# Patient Record
Sex: FEMALE | Race: WHITE | HISPANIC OR LATINO | Employment: OTHER | URBAN - METROPOLITAN AREA
[De-identification: names, ages, dates, MRNs, and addresses within clinical notes are randomized per-mention and may not be internally consistent; named-entity substitution may affect disease eponyms.]

---

## 2017-08-17 ENCOUNTER — OFFICE VISIT (OUTPATIENT)
Dept: MEDICAL GROUP | Facility: MEDICAL CENTER | Age: 62
End: 2017-08-17
Payer: COMMERCIAL

## 2017-08-17 VITALS
WEIGHT: 144 LBS | DIASTOLIC BLOOD PRESSURE: 74 MMHG | HEART RATE: 68 BPM | OXYGEN SATURATION: 99 % | HEIGHT: 60 IN | BODY MASS INDEX: 28.27 KG/M2 | RESPIRATION RATE: 16 BRPM | TEMPERATURE: 98 F | SYSTOLIC BLOOD PRESSURE: 122 MMHG

## 2017-08-17 DIAGNOSIS — Z87.39 HISTORY OF GOUT: ICD-10-CM

## 2017-08-17 DIAGNOSIS — E11.9 TYPE 2 DIABETES MELLITUS WITHOUT COMPLICATION, WITHOUT LONG-TERM CURRENT USE OF INSULIN (HCC): ICD-10-CM

## 2017-08-17 DIAGNOSIS — Z12.31 ENCOUNTER FOR SCREENING MAMMOGRAM FOR BREAST CANCER: ICD-10-CM

## 2017-08-17 DIAGNOSIS — S39.012D BACK STRAIN, SUBSEQUENT ENCOUNTER: ICD-10-CM

## 2017-08-17 DIAGNOSIS — Z12.11 ENCOUNTER FOR FIT (FECAL IMMUNOCHEMICAL TEST) SCREENING: ICD-10-CM

## 2017-08-17 DIAGNOSIS — Z80.3 FAMILY HISTORY OF BREAST CANCER: ICD-10-CM

## 2017-08-17 DIAGNOSIS — Z98.84 S/P GASTRIC BYPASS: ICD-10-CM

## 2017-08-17 DIAGNOSIS — F51.01 PRIMARY INSOMNIA: ICD-10-CM

## 2017-08-17 DIAGNOSIS — Z98.890: ICD-10-CM

## 2017-08-17 DIAGNOSIS — E03.9 ACQUIRED HYPOTHYROIDISM: ICD-10-CM

## 2017-08-17 DIAGNOSIS — E78.00 PURE HYPERCHOLESTEROLEMIA: ICD-10-CM

## 2017-08-17 PROBLEM — S39.012A BACK STRAIN: Status: ACTIVE | Noted: 2017-08-17

## 2017-08-17 PROCEDURE — 99204 OFFICE O/P NEW MOD 45 MIN: CPT | Performed by: NURSE PRACTITIONER

## 2017-08-17 RX ORDER — CYCLOBENZAPRINE HCL 10 MG
TABLET ORAL
COMMUNITY
Start: 2016-06-27 | End: 2019-07-26

## 2017-08-17 RX ORDER — PRAVASTATIN SODIUM 40 MG
TABLET ORAL
COMMUNITY
Start: 2017-04-17 | End: 2018-03-26 | Stop reason: SDUPTHER

## 2017-08-17 RX ORDER — TRAZODONE HYDROCHLORIDE 50 MG/1
TABLET ORAL
COMMUNITY
Start: 2017-04-17 | End: 2017-08-21 | Stop reason: SDUPTHER

## 2017-08-17 RX ORDER — BLOOD-GLUCOSE METER
KIT MISCELLANEOUS
COMMUNITY
Start: 2016-02-18 | End: 2018-02-17

## 2017-08-17 RX ORDER — COLCHICINE 0.6 MG/1
CAPSULE ORAL
COMMUNITY
Start: 2016-10-18 | End: 2018-10-18

## 2017-08-17 RX ORDER — LEVOTHYROXINE SODIUM 0.07 MG/1
TABLET ORAL
COMMUNITY
Start: 2016-10-11 | End: 2019-06-05

## 2017-08-17 RX ORDER — METFORMIN HYDROCHLORIDE 500 MG/1
500 TABLET, EXTENDED RELEASE ORAL
Qty: 90 TAB | Refills: 5 | Status: SHIPPED | OUTPATIENT
Start: 2017-08-17 | End: 2018-02-20 | Stop reason: SDUPTHER

## 2017-08-17 ASSESSMENT — PATIENT HEALTH QUESTIONNAIRE - PHQ9: CLINICAL INTERPRETATION OF PHQ2 SCORE: 0

## 2017-08-17 NOTE — ASSESSMENT & PLAN NOTE
Last a1c 7.3 4/2017 from care everywhere  On metformin 500 mg TID, having diarrhea daily- at least once  On pravastatin  No daily ASA (gastric bypass)  Losartan, unsure of dose  No CKD, neuropathy, retinopathy  Due for eye exam  Denies polyuria, polydipsia, numbness or tingling in extremities

## 2017-08-17 NOTE — PROGRESS NOTES
Chief Complaint   Patient presents with   • Establish Care     Concetta Tovar is a 62 y.o. female here to [facility], she has recently retired and relocated from [place]. She has 3 grown children, 5 grandchildren. She is single, living alone. She is walking regularly for exercise, generally following a healthy diet. I have reviewed her records from [place] in care everywhere. We discussed:     Family history of breast cancer  4 sisters, some cousins on her father's side  Last mammogram 4/2016 and normal  S/P gastric bypass  About 5 years ago, has lost 65 lbs  On multiple supplements  No h/o anemia  Type 2 diabetes mellitus without complication (CMS-HCC)  Last a1c 7.3 4/2017 from [facility] everywhere  On metformin 500 mg TID, having diarrhea daily- at least once  On pravastatin  No daily ASA (gastric bypass)  Losartan, unsure of dose  No CKD, neuropathy, retinopathy  Due for eye exam  Denies polyuria, polydipsia, numbness or tingling in extremities  Primary insomnia  Well controlled with trazodone  History of gout  Has used colchicine in the past with good results, avoiding dietary triggers  Back strain  Occasional difficulty with low back pain and muscle spasm, using Flexeril on a rare basis  Hypothyroidism  On [id number] µg daily. Stable weight and energy     Current medicines (including changes today)  Current Outpatient Prescriptions   Medication Sig Dispense Refill   • Blood Glucose Monitoring Suppl (ONETOUCH VERIO IQ SYSTEM) w/Device Kit by Does not apply route.     • Colchicine 0.6 MG Cap Take  by mouth.     • cyclobenzaprine (FLEXERIL) 10 MG Tab Take one-half tablet orally as needed for spasms at night     • levothyroxine (SYNTHROID) 75 MCG Tab Take 1 tablet by mouth daily     • pravastatin (PRAVACHOL) 40 MG tablet Take  by mouth.     • trazodone (DESYREL) 50 MG Tab Take one-half to 1 tablet by mouth daily at bedtime     • metformin ER (GLUCOPHAGE XR) 500 MG TABLET SR 24 HR Take 1 Tab by mouth 3  times a day, with meals. 90 Tab 5     No current facility-administered medications for this visit.     She  has a past medical history of Allergy; Anxiety; Diabetes (CMS-HCC); and Thyroid disease.  She  has past surgical history that includes abdominal exploration; eye surgery; hernia repair; and gastric bypass laparoscopic.  Social History   Substance Use Topics   • Smoking status: Former Smoker -- 5 years   • Smokeless tobacco: Never Used      Comment: light smoker, quit 40 years ago   • Alcohol Use: 0.0 oz/week     0 Standard drinks or equivalent per week     Social History     Social History Narrative   • No narrative on file     Family History   Problem Relation Age of Onset   • Arthritis Mother    • Diabetes Mother    • Hypertension Mother    • Hyperlipidemia Mother    • Stroke Mother    • Arthritis Father    • Hyperlipidemia Father    • Hypertension Father    • Diabetes Father    • Arthritis Sister    • Cancer Sister    • Hypertension Sister    • Hyperlipidemia Sister    • Alcohol/Drug Sister    • Hyperlipidemia Brother    • Arthritis Brother    • Hypertension Brother    • Alcohol/Drug Brother    • Arthritis Sister    • Cancer Sister    • Hypertension Sister    • Hyperlipidemia Sister    • Arthritis Sister    • Cancer Sister    • Hyperlipidemia Sister    • Arthritis Sister    • Cancer Sister    • Hyperlipidemia Sister    • Arthritis Sister    • Hyperlipidemia Sister    • Hyperlipidemia Sister    • Hyperlipidemia Sister    • Hyperlipidemia Sister    • Hyperlipidemia Brother    • Arthritis Brother    • Hyperlipidemia Brother      Family Status   Relation Status Death Age   • Mother     • Father     • Sister Alive    • Brother Alive    • Sister Alive    • Sister Alive    • Sister Alive    • Sister Alive    • Sister Alive    • Sister Alive    • Brother Alive    • Brother     • Son Alive    • Son Alive    • Daughter Alive          ROS  Problems listed discussed above, all other systems  reviewed and negative     Objective:     Blood pressure 122/74, pulse 68, temperature 36.7 °C (98 °F), resp. rate 16, height 1.524 m (5'), weight 65.318 kg (144 lb), SpO2 99 %. Body mass index is 28.12 kg/(m^2).  Physical Exam:  General: Alert, oriented in no acute distress.  Eye contact is good, speech is normal, affect calm  HEENT: perrl, Oral mucosa pink moist, no lesions. Nares patent. TMs gray with good landmarks bilaterally. No cervical or supraclavicular lymphadenopathy  Lungs: clear to auscultation bilaterally, good aeration, normal effort. No wheeze/ rhonchi/ rales.  CV: regular rate and rhythm, S1, S2. No murmur, no JVD, no edema. Pedal pulses 2 + bilaterally  Abdomen: soft, nontender, BS x4, no hepatosplenomegaly.  Ext: color normal, vascularity normal, temperature normal. No rash or lesions.  MS: no point tenderness over spine, no obvious deformity. No joint swelling or redness. Strength is 5/5 globally  Neuro: DTR 2+ bilaterally, sensation intact  Assessment and Plan:   The following treatment plan was discussed   1. Family history of breast cancer   4 sisters with breast cancer. She is due for mammogram, order placed.    2. S/P gastric bypass   on multiple supplements, weight stable    3. Pure hypercholesterolemia   well-controlled on pravastatin  COMP METABOLIC PANEL   4. Type 2 diabetes mellitus without complication, without long-term current use of insulin (CMS-[facility])   last A1c slightly above goal at 7.33 she is having significant GI upset with metformin, will change to extended-release preparation. Diabetic diet discussed, encouraged regular exercise. Recheck labs  COMP METABOLIC PANEL    HEMOGLOBIN A1C    MICROALBUMIN CREAT RATIO URINE    LIPID PROFILE    metformin ER (GLUCOPHAGE XR) 500 MG TABLET SR 24 HR    REFERRAL TO OPHTHALMOLOGY   5. Acquired hypothyroidism   clinically euthyroid  TSH+FREE T4   6. Primary insomnia   doing well with trazodone    7. History of gout   no current problems    8.  Back strain, subsequent encounter   occasional muscle strain of the low back, using Flexeril on rare occasions    9. Encounter for screening mammogram for breast cancer  MA-SCREEN MAMMO W/CAD-BILAT   10. Encounter for FIT (fecal immunochemical test) screening  OCCULT BLOOD FECES IMMUNOASSAY       Educated in proper administration of medication(s) ordered today including safety, possible SE, risks, benefits, rationale and alternatives to therapy.         Followup: Pending labs             Please note that this dictation was created using voice recognition software. I have worked with consultants from the vendor as well as technical experts from [employer] [facility] [employer] to optimize the interface. I have made every reasonable attempt to correct obvious errors, but I expect that there are errors of grammar and possibly content that I did not discover before finalizing the note.

## 2017-08-17 NOTE — MR AVS SNAPSHOT
Concetta Tovar   2017 1:20 PM   Office Visit   MRN: [id number]    Department:  [facility]   Dept Phone:  [phone]    Description:  Female : 1955   Provider:  ONELIA Johnson           Reason for Visit     Establish Care           Allergies as of 2017     Allergen Noted Reactions    Nsaids 2017       Other reaction(s): GI Bleeding  Contraindicated after [place] Gastric Bypass or sleeve gastrectomy  due to  risk ulceration in pouch or sleeve.     Hydrocodone-Acetaminophen 2017       HIVES W/ LORTAB ONLY    Lisinopril 2017   Rash    Lovastatin 2017       Other reaction(s): Muscle Ache    Penicillins 2017         You were diagnosed with     Family history of breast cancer   [037574]       S/P gastric bypass   [812383]       Pure hypercholesterolemia   [272.0.ICD-9-CM]       Type 2 diabetes mellitus without complication, without long-term current use of insulin (CMS-[facility])   [2541854]       Acquired hypothyroidism   [1790126]       Primary insomnia   [165019]       History of gout   [893543]       Back strain, subsequent encounter   [627505]       Encounter for screening mammogram for breast cancer   [7119008]       Encounter for FIT (fecal immunochemical test) screening   [[id number]]         Vital Signs     Blood Pressure Pulse Temperature Respirations Height Weight    122/74 mmHg 68 36.7 °C (98 °F) 16 1.524 m (5') 65.318 kg (144 lb)    Body Mass Index Oxygen Saturation Smoking Status             28.12 kg/m2 99% Former Smoker         Basic Information     Date Of Birth Sex Race Ethnicity Preferred Language    1955 Female White Non- English      Problem List              ICD-10-CM Priority Class Noted - Resolved    Family history of breast cancer Z80.3   2017 - Present    S/P gastric bypass Z98.84   2017 - Present    Pure hypercholesterolemia E78.00   2017 - Present    Type 2 diabetes mellitus without complication  (CMS-[facility]) E11.9   8/17/2017 - Present    Acquired hypothyroidism E03.9   8/17/2017 - Present    Primary insomnia F51.01   8/17/2017 - Present    History of gout Z87.39   8/17/2017 - Present    Back strain S39.012A   8/17/2017 - Present      Health Maintenance        Date Due Completion Dates    IMM DTaP/Tdap/Td Vaccine (1 - Tdap) 4/1/1973 ---    PAP SMEAR 4/1/1975 ---    MAMMOGRAM 4/1/1994 ---    COLONOSCOPY 4/1/2004 ---    IMM ZOSTER VACCINE 4/1/2014 ---    IMM INFLUENZA (1) 9/1/2017 ---            Current Immunizations     No immunizations on file.      Below and/or attached are the medications your provider expects you to take. Review all of your home medications and newly ordered medications with your provider and/or pharmacist. Follow medication instructions as directed by your provider and/or pharmacist. Please keep your medication list with you and share with your provider. Update the information when medications are discontinued, doses are changed, or new medications (including over-the-counter products) are added; and carry medication information at all times in the event of emergency situations     Allergies:  NSAIDS - (reactions not documented)     HYDROCODONE-ACETAMINOPHEN - (reactions not documented)     LISINOPRIL - Rash     LOVASTATIN - (reactions not documented)     PENICILLINS - (reactions not documented)               Medications  Valid as of: August 17, 2017 -  2:30 PM    Generic Name Brand Name Tablet Size Instructions for use    Blood Glucose Monitoring Suppl (Kit) ONETOUCH VERIO IQ SYSTEM w/Device by Does not apply route.        Colchicine (Cap) Colchicine 0.6 MG Take  by mouth.        Cyclobenzaprine HCl (Tab) FLEXERIL 10 MG Take one-half tablet orally as needed for spasms at night        Levothyroxine Sodium (Tab) SYNTHROID 75 MCG Take 1 tablet by mouth daily        MetFORMIN HCl (TABLET SR 24 HR) GLUCOPHAGE  MG Take 1 Tab by mouth 3 times a day, with meals.        Pravastatin Sodium  (Tab) PRAVACHOL 40 MG Take  by mouth.        TraZODone HCl (Tab) DESYREL 50 MG Take one-half to 1 tablet by mouth daily at bedtime        .                 Medicines prescribed today were sent to:     [id number] - [place], [place] - [address]    [address] [place] [address]    Phone: [phone] Fax: [phone]    Open 24 Hours?: No      Medication refill instructions:       If your prescription bottle indicates you have medication refills left, it is not necessary to call your provider’s office. Please contact your pharmacy and they will refill your medication.    If your prescription bottle indicates you do not have any refills left, you may request refills at any time through one of the following ways: The online hetras system (except Urgent Care), by calling your provider’s office, or by asking your pharmacy to contact your provider’s office with a refill request. Medication refills are processed only during regular business hours and may not be available until the next business day. Your provider may request additional information or to have a follow-up visit with you prior to refilling your medication.   *Please Note: Medication refills are assigned a new Rx number when refilled electronically. Your pharmacy may indicate that no refills were authorized even though a new prescription for the same medication is available at the pharmacy. Please request the medicine by name with the pharmacy before contacting your provider for a refill.        Your To Do List     Future Labs/Procedures Complete By Expires    COMP METABOLIC PANEL  As directed 8/18/2018    HEMOGLOBIN A1C  As directed 8/18/2018    LIPID PROFILE  As directed 8/18/2018    MA-SCREEN MAMMO W/CAD-BILAT  As directed 9/18/2018    MICROALBUMIN CREAT RATIO URINE  As directed 8/18/2018    OCCULT BLOOD FECES IMMUNOASSAY  As directed 8/18/2018      Referral     A referral request has been sent to our patient care coordination  department. Please allow 3-5 business days for us to process this request and contact you either by phone or mail. If you do not hear from us by the 5th business day, please call us at [phone].           Mac[employer] Access Code: Activation code not generated  Current Mac[employer] Status: Active

## 2017-08-18 ENCOUNTER — TELEPHONE (OUTPATIENT)
Dept: MEDICAL GROUP | Facility: MEDICAL CENTER | Age: 62
End: 2017-08-18

## 2017-08-18 RX ORDER — LOSARTAN POTASSIUM 25 MG/1
25 TABLET ORAL DAILY
Qty: 30 TAB | Refills: 0 | Status: SHIPPED | DISCHARGE
Start: 2017-08-18 | End: 2018-02-18 | Stop reason: SDUPTHER

## 2017-08-18 NOTE — TELEPHONE ENCOUNTER
----- Message from Your Healthcare Team sent at 8/17/2017  5:48 PM PDT -----  Regarding: Prescription Question  Contact: [phone]  losartan  25 mg    1/2 tablet daily

## 2017-08-21 ENCOUNTER — PATIENT MESSAGE (OUTPATIENT)
Dept: MEDICAL GROUP | Facility: MEDICAL CENTER | Age: 62
End: 2017-08-21

## 2017-08-21 RX ORDER — TRAZODONE HYDROCHLORIDE 50 MG/1
TABLET ORAL
Qty: 30 TAB | Refills: 4 | Status: SHIPPED | OUTPATIENT
Start: 2017-08-21 | End: 2018-01-29 | Stop reason: SDUPTHER

## 2017-08-23 ENCOUNTER — HOSPITAL ENCOUNTER (OUTPATIENT)
Dept: LAB | Facility: MEDICAL CENTER | Age: 62
End: 2017-08-23
Attending: NURSE PRACTITIONER
Payer: COMMERCIAL

## 2017-08-23 DIAGNOSIS — E78.00 PURE HYPERCHOLESTEROLEMIA: ICD-10-CM

## 2017-08-23 DIAGNOSIS — E11.9 TYPE 2 DIABETES MELLITUS WITHOUT COMPLICATION, WITHOUT LONG-TERM CURRENT USE OF INSULIN (HCC): ICD-10-CM

## 2017-08-23 LAB
ALBUMIN SERPL BCP-MCNC: 3.6 G/DL (ref 3.2–4.9)
ALBUMIN/GLOB SERPL: 0.9 G/DL
ALP SERPL-CCNC: 57 U/L (ref 30–99)
ALT SERPL-CCNC: 20 U/L (ref 2–50)
ANION GAP SERPL CALC-SCNC: 7 MMOL/L (ref 0–11.9)
AST SERPL-CCNC: 20 U/L (ref 12–45)
BILIRUB SERPL-MCNC: 0.8 MG/DL (ref 0.1–1.5)
BUN SERPL-MCNC: 9 MG/DL (ref 8–22)
CALCIUM SERPL-MCNC: 9.7 MG/DL (ref 8.5–10.5)
CHLORIDE SERPL-SCNC: 104 MMOL/L (ref 96–112)
CHOLEST SERPL-MCNC: 156 MG/DL (ref 100–199)
CO2 SERPL-SCNC: 25 MMOL/L (ref 20–33)
CREAT SERPL-MCNC: 0.67 MG/DL (ref 0.5–1.4)
CREAT UR-MCNC: 80.3 MG/DL
EST. AVERAGE GLUCOSE BLD GHB EST-MCNC: 143 MG/DL
FASTING STATUS PATIENT QL REPORTED: NORMAL
GFR SERPL CREATININE-BSD FRML MDRD: >60 ML/MIN/1.73 M 2
GLOBULIN SER CALC-MCNC: 3.8 G/DL (ref 1.9–3.5)
GLUCOSE SERPL-MCNC: 136 MG/DL (ref 65–99)
HBA1C MFR BLD: 6.6 % (ref 0–5.6)
HDLC SERPL-MCNC: 58 MG/DL
LDLC SERPL CALC-MCNC: 79 MG/DL
MICROALBUMIN UR-MCNC: <0.7 MG/DL
MICROALBUMIN/CREAT UR: NORMAL MG/G (ref 0–30)
POTASSIUM SERPL-SCNC: 4.4 MMOL/L (ref 3.6–5.5)
PROT SERPL-MCNC: 7.4 G/DL (ref 6–8.2)
SODIUM SERPL-SCNC: 136 MMOL/L (ref 135–145)
T4 FREE SERPL-MCNC: 0.97 NG/DL (ref 0.53–1.43)
TRIGL SERPL-MCNC: 97 MG/DL (ref 0–149)
TSH SERPL DL<=0.005 MIU/L-ACNC: 3.23 UIU/ML (ref 0.3–3.7)

## 2017-08-23 PROCEDURE — 80053 COMPREHEN METABOLIC PANEL: CPT

## 2017-08-23 PROCEDURE — 80061 LIPID PANEL: CPT

## 2017-08-23 PROCEDURE — 84439 ASSAY OF FREE THYROXINE: CPT

## 2017-08-23 PROCEDURE — 82570 ASSAY OF URINE CREATININE: CPT

## 2017-08-23 PROCEDURE — 82043 UR ALBUMIN QUANTITATIVE: CPT

## 2017-08-23 PROCEDURE — 36415 COLL VENOUS BLD VENIPUNCTURE: CPT

## 2017-08-23 PROCEDURE — 84443 ASSAY THYROID STIM HORMONE: CPT

## 2017-08-23 PROCEDURE — 83036 HEMOGLOBIN GLYCOSYLATED A1C: CPT

## 2017-08-24 ENCOUNTER — TELEPHONE (OUTPATIENT)
Dept: MEDICAL GROUP | Facility: MEDICAL CENTER | Age: 62
End: 2017-08-24

## 2017-08-24 NOTE — TELEPHONE ENCOUNTER
----- Message from ONELIA Johnson sent at 8/24/2017  8:31 AM PDT -----  Please f/u with patient by phone to ensure mychart message is received    Concetta,  Your diabetes is well controlled with A1c of 6.6. Kidney function, liver function, cholesterol, and thyroid levels look good as well.  Continue with your current medications.  Let me know if you have any questions.  Labs should be rechecked around February/March  Allison MAYA

## 2017-08-26 ENCOUNTER — HOSPITAL ENCOUNTER (OUTPATIENT)
Facility: MEDICAL CENTER | Age: 62
End: 2017-08-26
Attending: NURSE PRACTITIONER
Payer: COMMERCIAL

## 2017-08-26 PROCEDURE — 82274 ASSAY TEST FOR BLOOD FECAL: CPT

## 2017-08-27 ENCOUNTER — PATIENT MESSAGE (OUTPATIENT)
Dept: MEDICAL GROUP | Facility: MEDICAL CENTER | Age: 62
End: 2017-08-27

## 2017-08-29 DIAGNOSIS — Z12.11 ENCOUNTER FOR FIT (FECAL IMMUNOCHEMICAL TEST) SCREENING: ICD-10-CM

## 2017-08-30 LAB — HEMOCCULT STL QL IA: NEGATIVE

## 2017-09-23 ENCOUNTER — HOSPITAL ENCOUNTER (OUTPATIENT)
Dept: RADIOLOGY | Facility: MEDICAL CENTER | Age: 62
End: 2017-09-23
Attending: NURSE PRACTITIONER
Payer: COMMERCIAL

## 2017-09-23 DIAGNOSIS — Z12.31 ENCOUNTER FOR SCREENING MAMMOGRAM FOR BREAST CANCER: ICD-10-CM

## 2017-09-23 PROCEDURE — G0202 SCR MAMMO BI INCL CAD: HCPCS

## 2017-09-25 ENCOUNTER — TELEPHONE (OUTPATIENT)
Dept: MEDICAL GROUP | Facility: MEDICAL CENTER | Age: 62
End: 2017-09-25

## 2017-09-25 DIAGNOSIS — E11.9 TYPE 2 DIABETES MELLITUS WITHOUT COMPLICATION, WITHOUT LONG-TERM CURRENT USE OF INSULIN (HCC): ICD-10-CM

## 2017-09-25 NOTE — TELEPHONE ENCOUNTER
Please find out what glucometer she uses so we can send in the appropriate lancets and test strips.  Abhinav Randall M.D.

## 2017-09-26 RX ORDER — LANCETS 30 GAUGE
EACH MISCELLANEOUS
Qty: 100 EACH | Refills: 3 | Status: SHIPPED | OUTPATIENT
Start: 2017-09-26 | End: 2019-03-27 | Stop reason: SDUPTHER

## 2017-10-11 ENCOUNTER — HOSPITAL ENCOUNTER (OUTPATIENT)
Dept: RADIOLOGY | Facility: MEDICAL CENTER | Age: 62
End: 2017-10-11

## 2018-01-09 ENCOUNTER — OFFICE VISIT (OUTPATIENT)
Dept: MEDICAL GROUP | Facility: MEDICAL CENTER | Age: 63
End: 2018-01-09
Payer: COMMERCIAL

## 2018-01-09 VITALS
HEIGHT: 60 IN | DIASTOLIC BLOOD PRESSURE: 84 MMHG | TEMPERATURE: 98 F | HEART RATE: 82 BPM | WEIGHT: 141.4 LBS | BODY MASS INDEX: 27.76 KG/M2 | OXYGEN SATURATION: 98 % | SYSTOLIC BLOOD PRESSURE: 128 MMHG

## 2018-01-09 DIAGNOSIS — E03.9 ACQUIRED HYPOTHYROIDISM: ICD-10-CM

## 2018-01-09 DIAGNOSIS — E11.9 TYPE 2 DIABETES MELLITUS WITHOUT COMPLICATION, WITHOUT LONG-TERM CURRENT USE OF INSULIN (HCC): ICD-10-CM

## 2018-01-09 DIAGNOSIS — R05.3 CHRONIC COUGH: ICD-10-CM

## 2018-01-09 DIAGNOSIS — J31.0 CHRONIC RHINITIS, UNSPECIFIED TYPE: ICD-10-CM

## 2018-01-09 PROBLEM — J30.0 CHRONIC VASOMOTOR RHINITIS: Status: ACTIVE | Noted: 2018-01-09

## 2018-01-09 PROBLEM — J30.0 CHRONIC VASOMOTOR RHINITIS: Status: RESOLVED | Noted: 2018-01-09 | Resolved: 2018-01-09

## 2018-01-09 PROCEDURE — 99214 OFFICE O/P EST MOD 30 MIN: CPT | Performed by: NURSE PRACTITIONER

## 2018-01-09 RX ORDER — AZELASTINE 1 MG/ML
1 SPRAY, METERED NASAL 2 TIMES DAILY
Qty: 30 ML | Refills: 1 | Status: SHIPPED | OUTPATIENT
Start: 2018-01-09 | End: 2020-06-18 | Stop reason: SDUPTHER

## 2018-01-10 NOTE — ASSESSMENT & PLAN NOTE
"Was having concerns with fatigue, muscle ache, felt that she was \"hitting a wall\" every afternoon around 2 and would need to nap  She felt that this was related to her medication and has discontinued-about 3 weeks ago  She does feel that her energy has actually improved since stopping levothyroxine  Denies constipation, hair loss, heat or cold intolerance  "

## 2018-01-10 NOTE — ASSESSMENT & PLAN NOTE
Daily difficulty with rhinitis and postnasal drainage  She's tried multiple over-the-counter allergy medicines without improvement-currently taking Zyrtec and using Flonase daily  She does have postnasal drainage which may be a contributing factor for her cough  She denies any facial pain, pain in the teeth, headaches. No prior allergy testing

## 2018-01-10 NOTE — ASSESSMENT & PLAN NOTE
We switched to extended release metformin at last visit due to complaints of diarrhea. She took this for about a week and states that her sugar was persistently higher than usual, she has since gone back to immediate release and states that her glucose is coming down  She still having diarrhea about once daily but overall feels that she can deal with this  Last A1c 6.6  No polyuria, polydipsia

## 2018-01-10 NOTE — PROGRESS NOTES
"Subjective:     Chief Complaint   Patient presents with   • Medication Management     Concetta Tovar is a 62 y.o. female here today to follow up on:    Chronic cough  Daily dry unproductive nagging cough for the last last several years, worse at night   Was seen for this by her primary care in [place]  Had pulmonary function testing which showed no evidence of asthma or restrictive lung disease  She continues to cough and finds this quite troublesome. She does report postnasal drainage which may be a factor. Denies any heartburn    Acquired hypothyroidism  Was having concerns with fatigue, muscle ache, felt that she was \"hitting a wall\" every afternoon around 2 and would need to nap  She felt that this was related to her medication and has discontinued-about 3 weeks ago  She does feel that her energy has actually improved since stopping levothyroxine  Denies constipation, hair loss, heat or cold intolerance    Chronic rhinitis  Daily difficulty with rhinitis and postnasal drainage  She's tried multiple over-the-counter allergy medicines without improvement-currently taking Zyrtec and using Flonase daily  She does have postnasal drainage which may be a contributing factor for her cough  She denies any facial pain, pain in the teeth, headaches. No prior allergy testing    Type 2 diabetes mellitus without complication (CMS-[facility])  We switched to extended release metformin at last visit due to complaints of diarrhea. She took this for about a week and states that her sugar was persistently higher than usual, she has since gone back to immediate release and states that her glucose is coming down  She still having diarrhea about once daily but overall feels that she can deal with this  Last A1c 6.6  No polyuria, polydipsia       Current medicines (including changes today)  Current Outpatient Prescriptions   Medication Sig Dispense Refill   • azelastine (ASTELIN) 137 MCG/SPRAY nasal spray [place] 1 Spray in nose 2 times a " day. 30 mL 1   • Blood Glucose Monitoring Suppl Supplies Misc Test strips order: Test strips for One Touch Ultra meter. Sig: use once daily 100 Each 3   • Lancets Misc Lancets order: Lancets for One Touch Ultra meter. Sig: use once daily 100 Each 3   • Blood Glucose Monitoring Suppl Device Meter: Dispense Device of Insurance Preference. Sig. Use as directed for blood sugar monitoring. #1. NR. 1 Device 0   • trazodone (DESYREL) 50 MG Tab Take one-half to 1 tablet by mouth daily at bedtime 30 Tab 4   • losartan (COZAAR) 25 MG Tab Take 1 Tab by mouth every day. 30 Tab 0   • pravastatin (PRAVACHOL) 40 MG tablet Take  by mouth.     • metformin ER (GLUCOPHAGE XR) 500 MG TABLET SR 24 HR Take 1 Tab by mouth 3 times a day, with meals. 90 Tab 5   • Blood Glucose Monitoring Suppl (ONETOUCH VERIO IQ SYSTEM) w/Device Kit by Does not apply route.     • Colchicine 0.6 MG Cap Take  by mouth.     • cyclobenzaprine (FLEXERIL) 10 MG Tab Take one-half tablet orally as needed for spasms at night     • levothyroxine (SYNTHROID) 75 MCG Tab Take 1 tablet by mouth daily       No current facility-administered medications for this visit.      She  has a past medical history of Allergy; Anxiety; Diabetes (CMS-[facility]); and Thyroid disease.    ROS included above     Objective:     Blood pressure 128/84, pulse 82, temperature 36.7 °C (98 °F), height 1.524 m (5'), weight 64.1 kg (141 lb 6.4 oz), SpO2 98 %, not currently breastfeeding. Body mass index is 27.62 kg/m².     Physical Exam:  General: Alert, oriented in no acute distress.  Eye contact is good, speech is normal, affect calm  HEENT: Oral mucosa pink moist, no lesions. Nares with erythema, clear mucus. TMs gray with good landmarks bilaterally. No lymphadenopathy.  Lungs: clear to auscultation bilaterally, normal effort, no wheeze/ rhonchi/ rales.  CV: regular rate and rhythm, S1, S2, no murmur  Abdomen: soft, nontender  Ext: no edema, color normal, vascularity normal, temperature  normal    Assessment and Plan:   The following treatment plan was discussed   1. Chronic cough  Pulmonary function testing in the past without evidence of asthma or restrictive lung disease. She does report persistent postnasal drainage which may be a contributing factor. Will refer for allergy testing  REFERRAL TO ALLERGY   2. Acquired hypothyroidism  Felt that her levothyroxine was making her more fatigued and it discontinued about 3 weeks ago. Evaluate labs in one month  TSH+T3+T4F   3. Chronic rhinitis, unspecified type  Persistent, daily rhinitis. Getting minimal benefit from Zyrtec and Flonase. Will try adding azelastine nasal   Azelastine (ASTELIN) 137 MCG/SPRAY nasal spray   4. Type 2 diabetes mellitus without complication, without long-term current use of insulin (CMS-[facility])  Last A1c controlled at 6.6. She is back on immediately release metformin preparation. Reevaluate labs  COMP METABOLIC PANEL    HEMOGLOBIN A1C       Followup: Labs in February         Please note that this dictation was created using voice recognition software. I have worked with consultants from the vendor as well as technical experts from [facility] [employer] to optimize the interface. I have made every reasonable attempt to correct obvious errors, but I expect that there are errors of grammar and possibly content that I did not discover before finalizing the note.

## 2018-01-10 NOTE — ASSESSMENT & PLAN NOTE
Daily dry unproductive nagging cough for the last last several years, worse at night   Was seen for this by her primary care in [place]  Had pulmonary function testing which showed no evidence of asthma or restrictive lung disease  She continues to cough and finds this quite troublesome. She does report postnasal drainage which may be a factor. Denies any heartburn

## 2018-01-29 RX ORDER — TRAZODONE HYDROCHLORIDE 50 MG/1
TABLET ORAL
Qty: 30 TAB | Refills: 0 | Status: SHIPPED | OUTPATIENT
Start: 2018-01-29 | End: 2018-02-18 | Stop reason: SDUPTHER

## 2018-02-18 ENCOUNTER — PATIENT MESSAGE (OUTPATIENT)
Dept: MEDICAL GROUP | Facility: MEDICAL CENTER | Age: 63
End: 2018-02-18

## 2018-02-19 ENCOUNTER — OFFICE VISIT (OUTPATIENT)
Dept: URGENT CARE | Facility: CLINIC | Age: 63
End: 2018-02-19
Payer: COMMERCIAL

## 2018-02-19 VITALS
HEIGHT: 60 IN | WEIGHT: 141 LBS | SYSTOLIC BLOOD PRESSURE: 124 MMHG | TEMPERATURE: 98.2 F | OXYGEN SATURATION: 97 % | DIASTOLIC BLOOD PRESSURE: 84 MMHG | BODY MASS INDEX: 27.68 KG/M2 | HEART RATE: 85 BPM | RESPIRATION RATE: 14 BRPM

## 2018-02-19 DIAGNOSIS — H10.9 CONJUNCTIVITIS OF BOTH EYES, UNSPECIFIED CONJUNCTIVITIS TYPE: ICD-10-CM

## 2018-02-19 DIAGNOSIS — J01.00 ACUTE MAXILLARY SINUSITIS, RECURRENCE NOT SPECIFIED: ICD-10-CM

## 2018-02-19 PROCEDURE — 99214 OFFICE O/P EST MOD 30 MIN: CPT | Performed by: PHYSICIAN ASSISTANT

## 2018-02-19 RX ORDER — TOBRAMYCIN 3 MG/ML
1 SOLUTION/ DROPS OPHTHALMIC EVERY 4 HOURS
Qty: 1 BOTTLE | Refills: 0 | Status: SHIPPED
Start: 2018-02-19 | End: 2019-12-31

## 2018-02-19 RX ORDER — DOXYCYCLINE HYCLATE 100 MG
100 TABLET ORAL 2 TIMES DAILY
Qty: 14 TAB | Refills: 0 | Status: SHIPPED | OUTPATIENT
Start: 2018-02-19 | End: 2018-02-26

## 2018-02-19 ASSESSMENT — ENCOUNTER SYMPTOMS
NAUSEA: 0
EYE ITCHING: 0
TINGLING: 0
SPUTUM PRODUCTION: 0
MYALGIAS: 0
SINUS PRESSURE: 1
SENSORY CHANGE: 0
ABDOMINAL PAIN: 0
BLURRED VISION: 0
SHORTNESS OF BREATH: 0
FEVER: 0
HEADACHES: 0
EYE DISCHARGE: 1
PALPITATIONS: 0
SORE THROAT: 0
COUGH: 0
CHILLS: 0
FOCAL WEAKNESS: 0
SINUS PAIN: 1
WHEEZING: 0
PHOTOPHOBIA: 0
VOMITING: 0
EYE REDNESS: 1
DOUBLE VISION: 0

## 2018-02-19 NOTE — PROGRESS NOTES
Subjective:      Concetta Tovar is a 62 y.o. female who presents with Eye Problem (possible pink eye)            Eye Problem    Both eyes are affected.This is a new problem. Episode onset: 4-5 days. The problem occurs constantly. The problem has been unchanged. There was no injury mechanism. The patient is experiencing no pain. There is no known exposure to pink eye. She does not wear contacts. Associated symptoms include an eye discharge, eye redness and a recent URI. Pertinent negatives include no blurred vision, double vision, fever, itching, nausea, photophobia, tingling or vomiting. She has tried nothing for the symptoms.   Sinus Problem   This is a new problem. Episode onset: 4-5 days. The problem has been gradually worsening since onset. There has been no fever. Associated symptoms include congestion, ear pain and sinus pressure. Pertinent negatives include no chills, coughing, headaches, shortness of breath, sneezing or sore throat. Treatments tried: Dayquil, Nyquil. The treatment provided mild relief.     Past Medical History:   Diagnosis Date   • Allergy    • Anxiety    • Diabetes (CMS-HCC)    • Thyroid disease        Past Surgical History:   Procedure Laterality Date   • ABDOMINAL EXPLORATION     • EYE SURGERY     • GASTRIC BYPASS LAPAROSCOPIC     • HERNIA REPAIR         Family History   Problem Relation Age of Onset   • Arthritis Mother    • Diabetes Mother    • Hypertension Mother    • Hyperlipidemia Mother    • Stroke Mother    • Arthritis Father    • Hyperlipidemia Father    • Hypertension Father    • Diabetes Father    • Arthritis Sister    • Cancer Sister    • Hypertension Sister    • Hyperlipidemia Sister    • Alcohol/Drug Sister    • Breast Cancer Sister    • Hyperlipidemia Brother    • Arthritis Brother    • Hypertension Brother    • Alcohol/Drug Brother    • Arthritis Sister    • Cancer Sister    • Hypertension Sister    • Hyperlipidemia Sister    • Breast Cancer Sister    • Arthritis Sister     • Cancer Sister    • Hyperlipidemia Sister    • Breast Cancer Sister    • Arthritis Sister    • Cancer Sister    • Hyperlipidemia Sister    • Breast Cancer Sister    • Arthritis Sister    • Hyperlipidemia Sister    • Hyperlipidemia Sister    • Hyperlipidemia Sister    • Hyperlipidemia Brother    • Arthritis Brother    • Hyperlipidemia Brother    • Hyperlipidemia Sister        Allergies   Allergen Reactions   • Nsaids      Other reaction(s): GI Bleeding  Contraindicated after [place] Gastric Bypass or sleeve gastrectomy  due to  risk ulceration in pouch or sleeve.    • Hydrocodone-Acetaminophen      HIVES W/ LORTAB ONLY   • Lisinopril Rash   • Lovastatin      Other reaction(s): Muscle Ache   • Penicillins        Medications, Allergies, and current problem list reviewed today in Epic      Review of Systems   Constitutional: Positive for malaise/fatigue. Negative for chills and fever.   HENT: Positive for congestion, ear pain, sinus pain and sinus pressure. Negative for sneezing and sore throat.    Eyes: Positive for discharge and redness. Negative for blurred vision, double vision, photophobia and itching.   Respiratory: Negative for cough, sputum production, shortness of breath and wheezing.    Cardiovascular: Negative for chest pain, palpitations and leg swelling.   Gastrointestinal: Negative for abdominal pain, nausea and vomiting.   Musculoskeletal: Negative for myalgias.   Neurological: Negative for tingling, sensory change, focal weakness and headaches.     All other systems reviewed and are negative.        Objective:     /84   Pulse 85   Temp 36.8 °C (98.2 °F)   Resp 14   Ht 1.524 m (5')   Wt 64 kg (141 lb)   SpO2 97%   BMI 27.54 kg/m²      Physical Exam   Constitutional: She is oriented to person, place, and time. She appears well-developed and well-nourished. No distress.   HENT:   Head: Normocephalic and atraumatic.   Right Ear: Tympanic membrane, external ear and ear canal normal.   Left  Ear: Tympanic membrane, external ear and ear canal normal.   Nose: Mucosal edema and rhinorrhea present. Right sinus exhibits maxillary sinus tenderness. Left sinus exhibits maxillary sinus tenderness.   Mouth/Throat: Uvula is midline, oropharynx is clear and moist and mucous membranes are normal.   Eyes: EOM and lids are normal. Pupils are equal, round, and reactive to light. Right eye exhibits discharge. Left eye exhibits discharge. Right conjunctiva is injected. Left conjunctiva is injected.   Neck: Neck supple.   Cardiovascular: Normal rate, regular rhythm and normal heart sounds.  Exam reveals no gallop and no friction rub.    No murmur heard.  Pulmonary/Chest: Effort normal and breath sounds normal. No respiratory distress. She has no decreased breath sounds. She has no wheezes. She has no rhonchi. She has no rales.   Lymphadenopathy:     She has no cervical adenopathy.   Neurological: She is alert and oriented to person, place, and time. No cranial nerve deficit.   Skin: Skin is warm and dry. No rash noted.   Psychiatric: She has a normal mood and affect. Her behavior is normal. Judgment and thought content normal.               Assessment/Plan:     1. Acute maxillary sinusitis, recurrence not specified  - doxycycline (VIBRAMYCIN) 100 MG Tab; Take 1 Tab by mouth 2 times a day for 7 days.  Dispense: 14 Tab; Refill: 0    2. Conjunctivitis of both eyes, unspecified conjunctivitis type  - tobramycin (TOBREX) 0.3 % Solution ophthalmic solution; Place 1 Drop in both eyes every 4 hours. While awake for 5-7 days until symptoms.  Dispense: 1 Bottle; Refill: 0      Encouraged Flonase.     Differential diagnoses, Supportive care, and indications for immediate follow-up discussed with patient.   Instructed to return to clinic or nearest emergency department for any change in condition, further concerns, or worsening of symptoms.    The patient demonstrated a good understanding and agreed with the treatment  plan.      Venita Barboza P.A.-C.

## 2018-02-20 DIAGNOSIS — E11.9 TYPE 2 DIABETES MELLITUS WITHOUT COMPLICATION, WITHOUT LONG-TERM CURRENT USE OF INSULIN (HCC): ICD-10-CM

## 2018-02-20 RX ORDER — METFORMIN HYDROCHLORIDE 500 MG/1
500 TABLET, EXTENDED RELEASE ORAL
Qty: 90 TAB | Refills: 5 | Status: SHIPPED | OUTPATIENT
Start: 2018-02-20 | End: 2018-06-04 | Stop reason: SDUPTHER

## 2018-02-20 RX ORDER — POLYMYXIN B SULFATE AND TRIMETHOPRIM 1; 10000 MG/ML; [USP'U]/ML
1 SOLUTION OPHTHALMIC 4 TIMES DAILY
Qty: 10 ML | Refills: 0 | Status: SHIPPED
Start: 2018-02-20 | End: 2019-12-31

## 2018-03-26 ENCOUNTER — PATIENT MESSAGE (OUTPATIENT)
Dept: MEDICAL GROUP | Facility: MEDICAL CENTER | Age: 63
End: 2018-03-26

## 2018-03-26 RX ORDER — PRAVASTATIN SODIUM 40 MG
40 TABLET ORAL DAILY
Qty: 90 TAB | Refills: 3 | Status: SHIPPED | OUTPATIENT
Start: 2018-03-26 | End: 2018-06-04 | Stop reason: SDUPTHER

## 2018-03-26 NOTE — TELEPHONE ENCOUNTER
From: Concetta Tovar  To: ONELIA Johnson  Sent: 3/26/2018 9:38 AM PDT  Subject: Non-Urgent Medical Question    I am going in to the lab tomorrow morning and was wondering if, besides the A1c testing, if I needed my thyroid checked also. also I need a prescription for Pravastatin.

## 2018-03-27 ENCOUNTER — HOSPITAL ENCOUNTER (OUTPATIENT)
Dept: LAB | Facility: MEDICAL CENTER | Age: 63
End: 2018-03-27
Attending: NURSE PRACTITIONER
Payer: COMMERCIAL

## 2018-03-27 DIAGNOSIS — E11.9 TYPE 2 DIABETES MELLITUS WITHOUT COMPLICATION, WITHOUT LONG-TERM CURRENT USE OF INSULIN (HCC): ICD-10-CM

## 2018-03-27 LAB
ALBUMIN SERPL BCP-MCNC: 3.6 G/DL (ref 3.2–4.9)
ALBUMIN/GLOB SERPL: 1 G/DL
ALP SERPL-CCNC: 59 U/L (ref 30–99)
ALT SERPL-CCNC: 16 U/L (ref 2–50)
ANION GAP SERPL CALC-SCNC: 7 MMOL/L (ref 0–11.9)
AST SERPL-CCNC: 17 U/L (ref 12–45)
BILIRUB SERPL-MCNC: 0.7 MG/DL (ref 0.1–1.5)
BUN SERPL-MCNC: 13 MG/DL (ref 8–22)
CALCIUM SERPL-MCNC: 9.7 MG/DL (ref 8.5–10.5)
CHLORIDE SERPL-SCNC: 101 MMOL/L (ref 96–112)
CO2 SERPL-SCNC: 26 MMOL/L (ref 20–33)
CREAT SERPL-MCNC: 0.76 MG/DL (ref 0.5–1.4)
EST. AVERAGE GLUCOSE BLD GHB EST-MCNC: 140 MG/DL
GLOBULIN SER CALC-MCNC: 3.7 G/DL (ref 1.9–3.5)
GLUCOSE SERPL-MCNC: 163 MG/DL (ref 65–99)
HBA1C MFR BLD: 6.5 % (ref 0–5.6)
POTASSIUM SERPL-SCNC: 4.2 MMOL/L (ref 3.6–5.5)
PROT SERPL-MCNC: 7.3 G/DL (ref 6–8.2)
SODIUM SERPL-SCNC: 134 MMOL/L (ref 135–145)
T3 SERPL-MCNC: 81.8 NG/DL (ref 60–181)
T4 FREE SERPL-MCNC: 0.89 NG/DL (ref 0.53–1.43)
TSH SERPL DL<=0.005 MIU/L-ACNC: 9.79 UIU/ML (ref 0.38–5.33)

## 2018-03-27 PROCEDURE — 83036 HEMOGLOBIN GLYCOSYLATED A1C: CPT

## 2018-03-27 PROCEDURE — 84439 ASSAY OF FREE THYROXINE: CPT

## 2018-03-27 PROCEDURE — 84443 ASSAY THYROID STIM HORMONE: CPT

## 2018-03-27 PROCEDURE — 84480 ASSAY TRIIODOTHYRONINE (T3): CPT

## 2018-03-27 PROCEDURE — 36415 COLL VENOUS BLD VENIPUNCTURE: CPT

## 2018-03-27 PROCEDURE — 80053 COMPREHEN METABOLIC PANEL: CPT

## 2018-03-29 DIAGNOSIS — E03.9 ACQUIRED HYPOTHYROIDISM: ICD-10-CM

## 2018-03-29 RX ORDER — LEVOTHYROXINE SODIUM 0.07 MG/1
TABLET ORAL
Qty: 30 TAB | Status: CANCELLED | OUTPATIENT
Start: 2018-03-29 | End: 2020-11-20

## 2018-03-29 NOTE — TELEPHONE ENCOUNTER
----- Message from Concetta Tovar sent at 3/29/2018  3:11 PM PDT -----  Regarding: Prescription Question  Contact: [phone]  Please go ahead and order my Thyroid medication.

## 2018-03-30 RX ORDER — LEVOTHYROXINE SODIUM 0.05 MG/1
50 TABLET ORAL
Qty: 30 TAB | Refills: 2 | Status: SHIPPED | OUTPATIENT
Start: 2018-03-30 | End: 2018-07-10 | Stop reason: SDUPTHER

## 2018-05-23 ENCOUNTER — PATIENT MESSAGE (OUTPATIENT)
Dept: MEDICAL GROUP | Facility: MEDICAL CENTER | Age: 63
End: 2018-05-23

## 2018-05-23 NOTE — TELEPHONE ENCOUNTER
From: Concetta Tovar  To: ONELIA Johnson  Sent: 5/23/2018 8:27 AM PDT  Subject: Test Result Question    do I need to take a blood test before my Appt. on this Friday?

## 2018-05-24 ENCOUNTER — HOSPITAL ENCOUNTER (OUTPATIENT)
Dept: LAB | Facility: MEDICAL CENTER | Age: 63
End: 2018-05-24
Attending: NURSE PRACTITIONER
Payer: COMMERCIAL

## 2018-05-24 LAB
T4 FREE SERPL-MCNC: 1.06 NG/DL (ref 0.53–1.43)
TSH SERPL DL<=0.005 MIU/L-ACNC: 4.01 UIU/ML (ref 0.38–5.33)

## 2018-05-24 PROCEDURE — 84439 ASSAY OF FREE THYROXINE: CPT

## 2018-05-24 PROCEDURE — 84443 ASSAY THYROID STIM HORMONE: CPT

## 2018-05-24 PROCEDURE — 36415 COLL VENOUS BLD VENIPUNCTURE: CPT

## 2018-05-25 ENCOUNTER — OFFICE VISIT (OUTPATIENT)
Dept: MEDICAL GROUP | Facility: MEDICAL CENTER | Age: 63
End: 2018-05-25
Payer: COMMERCIAL

## 2018-05-25 VITALS
BODY MASS INDEX: 27.29 KG/M2 | HEART RATE: 78 BPM | RESPIRATION RATE: 16 BRPM | DIASTOLIC BLOOD PRESSURE: 76 MMHG | OXYGEN SATURATION: 100 % | TEMPERATURE: 98 F | SYSTOLIC BLOOD PRESSURE: 120 MMHG | HEIGHT: 60 IN | WEIGHT: 139 LBS

## 2018-05-25 DIAGNOSIS — M25.551 RIGHT HIP PAIN: ICD-10-CM

## 2018-05-25 DIAGNOSIS — E03.9 ACQUIRED HYPOTHYROIDISM: ICD-10-CM

## 2018-05-25 DIAGNOSIS — E11.9 TYPE 2 DIABETES MELLITUS WITHOUT COMPLICATION, WITHOUT LONG-TERM CURRENT USE OF INSULIN (HCC): ICD-10-CM

## 2018-05-25 DIAGNOSIS — Z78.0 POSTMENOPAUSAL STATUS: ICD-10-CM

## 2018-05-25 DIAGNOSIS — Z12.39 ENCOUNTER FOR OTHER SCREENING FOR MALIGNANT NEOPLASM OF BREAST: ICD-10-CM

## 2018-05-25 PROCEDURE — 99214 OFFICE O/P EST MOD 30 MIN: CPT | Performed by: NURSE PRACTITIONER

## 2018-05-25 NOTE — ASSESSMENT & PLAN NOTE
Stable posterior 1-6.5. Compliant with medication, watching her diet. Active but no plan exercise  No polyuria, polydipsia, numbness or tingling in extremities

## 2018-05-25 NOTE — ASSESSMENT & PLAN NOTE
Reports that she's had difficulty with low back pain in the past but recently it seems to be more focused in the right hip. Pain is supple nontender times with radiation around the hip and toward the groin. Aggravated by long periods of sitting, feels better when she is up and moving around. She does have difficulty lying on her side. She is unable to take NSAIDs due to history of gastric bypass.  No numbness, tingling, weakness in LE. No saddle anesthesia. No joint instability

## 2018-05-25 NOTE — PROGRESS NOTES
Subjective:     Chief Complaint   Patient presents with   • Follow-Up     Thyriod   • Hip Pain     Right hip and lower stomach pain      Che Tovar is a 63 y.o. female here today to follow up on:    Right hip pain  Reports that she's had difficulty with low back pain in the past but recently it seems to be more focused in the right hip. Pain is supple nontender times with radiation around the hip and toward the groin. Aggravated by long periods of sitting, feels better when she is up and moving around. She does have difficulty lying on her side. She is unable to take NSAIDs due to history of gastric bypass.  No numbness, tingling, weakness in LE. No saddle anesthesia. No joint instability    Acquired hypothyroidism  Labs improved with levothyroxine, she notes improvement in energy level. No constipation, or loss, heat or cold intolerance    Type 2 diabetes mellitus without complication (CMS-HCC)  Stable with last A1c 6.5. Compliant with medication, watching her diet. Active but no plan exercise  No polyuria, polydipsia, numbness or tingling in extremities       Current medicines (including changes today)  Current Outpatient Prescriptions   Medication Sig Dispense Refill   • levothyroxine (SYNTHROID) 50 MCG Tab Take 1 Tab by mouth Every morning on an empty stomach. 30 Tab 2   • pravastatin (PRAVACHOL) 40 MG tablet Take 1 Tab by mouth every day. 90 Tab 3   • losartan (COZAAR) 25 MG Tab Take 1 Tab by mouth every day. 30 Tab 11   • traZODone (DESYREL) 50 MG Tab Take 1 Tab by mouth every bedtime. 30 Tab 11   • polymixin-trimethoprim (POLYTRIM) 14962-3.1 UNIT/ML-% Solution Place 1 Drop in both eyes 4 times a day. 10 mL 0   • metFORMIN ER (GLUCOPHAGE XR) 500 MG TABLET SR 24 HR Take 1 Tab by mouth 3 times a day, with meals. 90 Tab 5   • tobramycin (TOBREX) 0.3 % Solution ophthalmic solution Place 1 Drop in both eyes every 4 hours. While awake for 5-7 days until symptoms. 1 Bottle 0   • azelastine (ASTELIN) 137  MCG/SPRAY nasal spray Spray 1 Spray in nose 2 times a day. 30 mL 1   • Blood Glucose Monitoring Suppl Supplies Misc Test strips order: Test strips for One Touch Ultra meter. Sig: use once daily 100 Each 3   • Lancets Misc Lancets order: Lancets for One Touch Ultra meter. Sig: use once daily 100 Each 3   • Blood Glucose Monitoring Suppl Device Meter: Dispense Device of Insurance Preference. Sig. Use as directed for blood sugar monitoring. #1. NR. 1 Device 0   • Colchicine 0.6 MG Cap Take  by mouth.     • cyclobenzaprine (FLEXERIL) 10 MG Tab Take one-half tablet orally as needed for spasms at night     • levothyroxine (SYNTHROID) 75 MCG Tab Take 1 tablet by mouth daily       No current facility-administered medications for this visit.      She  has a past medical history of Allergy; Anxiety; Diabetes (HCC); and Thyroid disease.    ROS included above     Objective:     Blood pressure 120/76, pulse 78, temperature 36.7 °C (98 °F), resp. rate 16, height 1.524 m (5'), weight 63 kg (139 lb), SpO2 100 %. Body mass index is 27.15 kg/m².     Physical Exam:  General: Alert, oriented in no acute distress.  Eye contact is good, speech is normal, affect calm  Lungs: clear to auscultation bilaterally, normal effort, no wheeze/ rhonchi/ rales.  CV: regular rate and rhythm, S1, S2, no murmur  Abdomen: soft, nontender  MS: No tenderness over the lumbar spine, no obvious deformity. Tenderness with stress of right SI joint. Limited right hip flexion. Normal gait  Ext: no edema, color normal, vascularity normal, temperature normal    Assessment and Plan:   The following treatment plan was discussed   1. Right hip pain  Pain over the right SI joint, limited flexibility right hip. Will obtain x-rays as listed below. Discussed referral to physical therapy pending results. Advised over-the-counter acetaminophen as needed. Continue to avoid NSAIDs due to history of gastric bypass  DX-SACROILIAC JOINTS 3+    DX-HIP-COMPLETE - UNILATERAL 2+  RIGHT   2. Acquired hypothyroidism  Stable  TSH+FREE T4   3. Type 2 diabetes mellitus without complication, without long-term current use of insulin (HCC)  Stable, labs in September  LIPID PROFILE    HEMOGLOBIN A1C    COMP METABOLIC PANEL   4. Encounter for other screening for malignant neoplasm of breast  Mammogram and bone density test in October  MA-SCREEN MAMMO W/CAD-BILAT   5. Postmenopausal status  DS-BONE DENSITY STUDY (DEXA)       Followup: Pending x-ray         Please note that this dictation was created using voice recognition software. I have worked with consultants from the vendor as well as technical experts from [employer] to optimize the interface. I have made every reasonable attempt to correct obvious errors, but I expect that there are errors of grammar and possibly content that I did not discover before finalizing the note.

## 2018-05-31 ENCOUNTER — HOSPITAL ENCOUNTER (OUTPATIENT)
Dept: RADIOLOGY | Facility: MEDICAL CENTER | Age: 63
End: 2018-05-31
Attending: NURSE PRACTITIONER
Payer: COMMERCIAL

## 2018-05-31 DIAGNOSIS — M25.551 RIGHT HIP PAIN: ICD-10-CM

## 2018-05-31 PROCEDURE — 73502 X-RAY EXAM HIP UNI 2-3 VIEWS: CPT | Mod: RT

## 2018-05-31 PROCEDURE — 72202 X-RAY EXAM SI JOINTS 3/> VWS: CPT

## 2018-06-04 ENCOUNTER — TELEPHONE (OUTPATIENT)
Dept: MEDICAL GROUP | Facility: MEDICAL CENTER | Age: 63
End: 2018-06-04

## 2018-06-04 DIAGNOSIS — E11.9 TYPE 2 DIABETES MELLITUS WITHOUT COMPLICATION, WITHOUT LONG-TERM CURRENT USE OF INSULIN (HCC): ICD-10-CM

## 2018-06-04 DIAGNOSIS — M25.551 RIGHT HIP PAIN: ICD-10-CM

## 2018-06-04 RX ORDER — PRAVASTATIN SODIUM 40 MG
40 TABLET ORAL DAILY
Qty: 90 TAB | Refills: 1 | Status: SHIPPED | OUTPATIENT
Start: 2018-06-04 | End: 2019-12-04 | Stop reason: SDUPTHER

## 2018-06-04 RX ORDER — METFORMIN HYDROCHLORIDE 500 MG/1
500 TABLET, EXTENDED RELEASE ORAL
Qty: 270 TAB | Refills: 1 | Status: SHIPPED | OUTPATIENT
Start: 2018-06-04 | End: 2019-04-17 | Stop reason: SDUPTHER

## 2018-06-04 NOTE — TELEPHONE ENCOUNTER
----- Message from Shruthi Mtz sent at 6/4/2018  9:37 AM PDT -----  Regarding: FW: Referral Request  Contact: [phone]      ----- Message -----  From: Che oTvar  Sent: 6/3/2018   3:30 PM  To: Amb All Mas  Subject: Referral Request                                 Che Tovar would like to request a referral.  Reason: physical therapy  Requested provider: Delisa li  Comment:  May I please get a referral for physicsl therapy for my hip

## 2018-06-06 ENCOUNTER — HOSPITAL ENCOUNTER (OUTPATIENT)
Dept: RADIOLOGY | Facility: MEDICAL CENTER | Age: 63
End: 2018-06-06
Attending: NURSE PRACTITIONER
Payer: COMMERCIAL

## 2018-06-06 DIAGNOSIS — J32.9 SINUSITIS, UNSPECIFIED CHRONICITY, UNSPECIFIED LOCATION: ICD-10-CM

## 2018-06-06 PROCEDURE — 70486 CT MAXILLOFACIAL W/O DYE: CPT

## 2018-06-26 ENCOUNTER — PHYSICAL THERAPY (OUTPATIENT)
Dept: PHYSICAL THERAPY | Facility: MEDICAL CENTER | Age: 63
End: 2018-06-26
Attending: NURSE PRACTITIONER
Payer: COMMERCIAL

## 2018-06-26 DIAGNOSIS — M25.551 RIGHT HIP PAIN: ICD-10-CM

## 2018-06-26 PROCEDURE — 97110 THERAPEUTIC EXERCISES: CPT

## 2018-06-26 PROCEDURE — 97162 PT EVAL MOD COMPLEX 30 MIN: CPT

## 2018-06-26 ASSESSMENT — ENCOUNTER SYMPTOMS
PAIN SCALE: 4
PAIN TIMING: INTERMITTENT
QUALITY: ACHING
PAIN SCALE AT HIGHEST: 5
PAIN SCALE AT LOWEST: 3
ALLEVIATING FACTOR: WALKING

## 2018-06-26 NOTE — OP THERAPY EVALUATION
"  Outpatient Physical Therapy  INITIAL EVALUATION    [facility] Out[facility] Physical Therapy  [address]  [place] [address]  Phone:  [phone]  Fax:  [phone]    Date of Evaluation: 2018    Patient: Che Tovar  YOB: 1955  MRN: [id number]     Referring Provider: ONELIA Johnson  [address]  [address]  [place] [address]   Referring Diagnosis Right hip pain [M25.551]     Time Calculation  Start time: 1100  Stop time: 1200 Time Calculation (min): 60 minutes     Physical Therapy Occurrence Codes    Date of onset of impairment:  18   Date physical therapy care plan established or reviewed:  18   Date physical therapy treatment started:  18          Chief Complaint: Back Problem and Hip Problem    Visit Diagnoses     ICD-10-CM   1. Right hip pain M25.551         Subjective   History of Present Illness:     History of chief complaint:  Che, goes by \"Concetta\" presents to the clinic today with complaint of mild to moderate achy R hip pain. She recently moved her from [place] 1 year ago.  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 Her R hip gives her to most trouble when sit to long (1+ hour). She reports its better with walking. She is active gardening, shopping and walking or hiking a couple times a week. She was previously seeing a chiropractor in [place] who helped with her hip and low back pain. She wants to get back on track and see if she can improve her symptoms     Pain:     Current pain ratin    At best pain rating:  3    At worst pain ratin    Quality:  Aching    Pain timing:  Intermittent    Aggravating factors:  Sitting for longer for an hour     Relieving factors:  Walking    Progression:  Unchanged    Activity Tolerance:     Current activity tolerance / Recreational activities:  Hike with friends, retired, shopping.     Work:  Retired     Social Support:     Lives " in:  Multiple-level home    Diagnostic Tests:     X-ray: normal (Mild arthritis in hips)          Past Medical History:   Diagnosis Date   • Allergy    • Anxiety    • Diabetes (HCC)    • Thyroid disease      Past Surgical History:   Procedure Laterality Date   • ABDOMINAL EXPLORATION     • EYE SURGERY     • GASTRIC BYPASS LAPAROSCOPIC     • HERNIA REPAIR         Precautions:       Objective   Observation and functional movement:  Walks without AD. No antaglic limp or movement. She has no limitation with most functional tasks. She has some trouble with sleep as she lays on her painful R side. Transition up to sitting via log roll is painful     Range of motion and strength:    Active range of motion is within functional limits.    Strength is within functional limits.    Sensation and reflexes:     Sensation is intact.    Palpation and joint mobility:     Guarded and some discomfort with hip mobility into flexion and ER     Special tests:      SI compression/gapping (neg)    Balance:     No balance deficits noted.    Gait:      Normal pattern gait.            Therapeutic Exercises (CPT 93216):     1. Develop HEP , See below       Therapeutic Exercise Summary: Access Code: [email or web address]   URL: [email or web address]   Date: 06/26/2018   Prepared by: Mc Zarate     Exercises  · Supine Posterior Pelvic Tilt - 10 reps - 1x daily - 5x weekly  · Supine Hamstring Stretch - 10 reps - 1x daily - 5x weekly  · Bird Dog - 10 reps - 3 sets - 1x daily - 5x weekly  · Side Plank on Knees - 10 reps - 3 sets - 1x daily - 5x weekly  · Standing Hip ER Stretch at Table - 2 sets - 15 sec hold - 2x daily - 5x weekly  · Standing Quadratus Lumborum Stretch with Doorway - 2 sets - 10-15 hold - 2x daily - 5x weekly      Therapeutic Treatments and Modalities:     1. Manual Therapy (CPT 69459), R LE, Belted hip mobs inf and laterally    Time-based treatments/modalities:  Therapeutic exercise minutes (CPT 09457): 15 minutes       Assessment,  "Response and Plan:   Assessment details:  Concetta presents with chronic \"hip\" pain Symptoms appear to be referring from low back with some radicular symptoms done to R knee. She feels the worse when sleeping at night and will usually warm up and work through discomfort in the day. Her R posterior/lateral hip capsule and SI joint are tender to palpation. She likely has some common degeneration from prolonged siting with poor trunk and hip activation to support good posture and position. She would benefit from for hip and trunk stability and body mechanics and awareness for help in reducing pain.       Prognosis: fair    Goals:   Short Term Goals:   1. Establish HEP  2. Decrease Symptoms 25% via Subjective report/Objective pain scale  3. Improve sleep 25% via subjective report  4. Patient to show neutral spine bracing in supine with UE/LE movements   Short term goal time span:  4-6 weeks      Long Term Goals:    1. Ind in HEP   2. Decrease symptoms 50% via subjective report/Objective pain scale  3. Decrease [place] score via 5 points  4. Improve sleep 50% via subjective report  5. Patient to show bracing neutral in standing  6. Patient show good hip hinge for squat and deadlift pattern    Long term goal time span:  6-8 weeks    Plan:   Therapy options:  Physical therapy treatment to continue  Planned therapy interventions:  E Stim Unattended (CPT 72253), Manual Therapy (CPT 87623), Mechanical Traction (CPT 76704), Neuromuscular Re-education (CPT 63812) and Therapeutic Exercise (CPT 10923)  Frequency:  2x week  Duration in weeks:  8  Duration in visits:  16  Plan details:  2 x week for 16 visits per RX       Functional Limitation G-Codes and Severity Modifiers  WOMAC Grand Total: 31.25   Current:     Goal:       Referring provider co-signature:  I have reviewed this plan of care and my co-signature certifies the need for services.  Certification Dates:   From 6/26/18   To 8/14/18    Physician Signature: " ________________________________ Date: ______________

## 2018-06-29 ENCOUNTER — APPOINTMENT (OUTPATIENT)
Dept: PHYSICAL THERAPY | Facility: MEDICAL CENTER | Age: 63
End: 2018-06-29
Attending: NURSE PRACTITIONER
Payer: COMMERCIAL

## 2018-07-03 ENCOUNTER — APPOINTMENT (OUTPATIENT)
Dept: PHYSICAL THERAPY | Facility: MEDICAL CENTER | Age: 63
End: 2018-07-03
Attending: NURSE PRACTITIONER
Payer: COMMERCIAL

## 2018-07-19 ENCOUNTER — PHYSICAL THERAPY (OUTPATIENT)
Dept: PHYSICAL THERAPY | Facility: MEDICAL CENTER | Age: 63
End: 2018-07-19
Attending: NURSE PRACTITIONER
Payer: COMMERCIAL

## 2018-07-19 DIAGNOSIS — M25.551 RIGHT HIP PAIN: ICD-10-CM

## 2018-07-19 PROCEDURE — 97014 ELECTRIC STIMULATION THERAPY: CPT

## 2018-07-19 PROCEDURE — 97110 THERAPEUTIC EXERCISES: CPT

## 2018-07-19 PROCEDURE — 97140 MANUAL THERAPY 1/> REGIONS: CPT

## 2018-07-19 NOTE — OP THERAPY DAILY TREATMENT
Outpatient Physical Therapy  DAILY TREATMENT     [facility] Outpatient Physical Therapy  [address]  [place] [address]  Phone:  [phone]  Fax:  [phone]    Date: 07/19/2018    Patient: Che Tovar  YOB: 1955  MRN: [id number]     Time Calculation  Start time: 0830  Stop time: 0915 Time Calculation (min): 45 minutes     Chief Complaint: Back Problem and Hip Problem    Visit #: 2    SUBJECTIVE:  Patient seen for follow up on R hip and low back symptoms. She has been doing fair. Same good in the day and tough a night and upon first waking.     OBJECTIVE:  Current objective measures:           Therapeutic Exercises (CPT 79589):     1. Pt/TrA contraction review    2. Double double for Si stabilty, Pt with Ball add squeeze x 5 sec followed by bridge and adduction iso 5 sec against belt    3. Prone over ball for psoas release    4. Standing hip flexor stretch.     Therapeutic Treatments and Modalities:     1. Manual Therapy (CPT 05986), Low back R hip, Prone STM to erectors, QL and glute med. L SL, glute med peel to R hip, iso contraction to TFL and glute max. Belted hi mobs for increase posterior capsule space.     Time-based treatments/modalities:  Manual therapy minutes (CPT 44923): 20 minutes  Therapeutic exercise minutes (CPT 59690): 10 minutes       Pain rating before treatment: 0  Pain rating after treatment: 0    ASSESSMENT:   Response to treatment: Patient with some hip flexor cramping post manual work. Work on exercises for hip flexor. Will continue to work as able.     PLAN/RECOMMENDATIONS:   Plan for treatment: therapy treatment to continue next visit.  Planned interventions for next visit: continue with current treatment.

## 2018-07-25 ENCOUNTER — PHYSICAL THERAPY (OUTPATIENT)
Dept: PHYSICAL THERAPY | Facility: MEDICAL CENTER | Age: 63
End: 2018-07-25
Attending: NURSE PRACTITIONER
Payer: COMMERCIAL

## 2018-07-25 DIAGNOSIS — M25.551 RIGHT HIP PAIN: ICD-10-CM

## 2018-07-25 PROCEDURE — 97140 MANUAL THERAPY 1/> REGIONS: CPT

## 2018-07-25 PROCEDURE — 97110 THERAPEUTIC EXERCISES: CPT

## 2018-07-25 NOTE — OP THERAPY DAILY TREATMENT
Outpatient Physical Therapy  DAILY TREATMENT     [facility] Outpatient Physical Therapy  [address]  [place] [address]  Phone:  [phone]  Fax:  [phone]    Date: 07/25/2018    Patient: Che Tovar  YOB: 1955  MRN: [id number]     Time Calculation  Start time: 1530  Stop time: 1615 Time Calculation (min): 45 minutes     Chief Complaint: Back Injury and Hip Problem    Visit #: 3    SUBJECTIVE:  Patient seen for follow up on R hip and low back symptoms. She has been doing fair. She really like the is ball squeeze   OBJECTIVE:  Current objective measures:           Therapeutic Exercises (CPT 49645):     1. Pt/TrA contraction review    2. Double double for SI stabilty, Pt with Ball add squeeze x 5 sec followed by bridge and adduction iso 5 sec against belt    Therapeutic Treatments and Modalities:     1. Manual Therapy (CPT 75180), Low back R hip, Prone STM to erectors, QL and glute med. L SL, glute med peel to R hip, iso contraction to TFL and glute max. Belted hi mobs for increase posterior capsule space.     Time-based treatments/modalities:  Manual therapy minutes (CPT 16418): 20 minutes  Therapeutic exercise minutes (CPT 15119): 10 minutes       Pain rating before treatment: 0  Pain rating after treatment: 0    ASSESSMENT:   Response to treatment:  Work on exercises for hip flexor. Will continue to work as able. Give some travel stretch ideas as well as she will go on a trip in a week or so.     PLAN/RECOMMENDATIONS:   Plan for treatment: therapy treatment to continue next visit.  Planned interventions for next visit: continue with current treatment.

## 2018-07-31 ENCOUNTER — PHYSICAL THERAPY (OUTPATIENT)
Dept: PHYSICAL THERAPY | Facility: MEDICAL CENTER | Age: 63
End: 2018-07-31
Attending: NURSE PRACTITIONER
Payer: COMMERCIAL

## 2018-07-31 DIAGNOSIS — M25.551 RIGHT HIP PAIN: ICD-10-CM

## 2018-07-31 PROCEDURE — 97140 MANUAL THERAPY 1/> REGIONS: CPT

## 2018-07-31 PROCEDURE — 97014 ELECTRIC STIMULATION THERAPY: CPT

## 2018-07-31 PROCEDURE — 97110 THERAPEUTIC EXERCISES: CPT

## 2018-07-31 NOTE — OP THERAPY DAILY TREATMENT
Outpatient Physical Therapy  DAILY TREATMENT     [facility] Outpatient Physical Therapy  [address]  [place] [address]  Phone:  [phone]  Fax:  [phone]    Date: 07/31/2018    Patient: Che Tovar  YOB: 1955  MRN: [id number]     Time Calculation  Start time: 0830  Stop time: 0915 Time Calculation (min): 45 minutes     Chief Complaint: Back Problem and Hip Problem    Visit #: 4    SUBJECTIVE:  Patient seen for follow up on R hip and low back symptoms. She is doing bit better overall. She is looking forward to traveling on vacation for a while.     OBJECTIVE:  Current objective measures:           Therapeutic Exercises (CPT 75022):     1. Review Travel HEP, 90-90 iso squeeze, piriformis stretch, towel roll behind back     Therapeutic Treatments and Modalities:     1. Manual Therapy (CPT 44073), Low back R hip, Prone STM to erectors, QL and glute med. L SL, glute med peel to R hip, iso contraction to TFL and glute max. Belted hi mobs for increase posterior capsule space.     Time-based treatments/modalities:  Manual therapy minutes (CPT 87913): 20 minutes  Therapeutic exercise minutes (CPT 48571): 10 minutes       Pain rating before treatment: 0  Pain rating after treatment: 0    ASSESSMENT:   Response to treatment:  Work on exercises for hip flexor. Will continue to work as able. Will follow up after her vacation and see how she is doing would like to progress to some more active stability exercises if able.   PLAN/RECOMMENDATIONS:   Plan for treatment: therapy treatment to continue next visit.  Planned interventions for next visit: continue with current treatment.

## 2018-08-02 RX ORDER — POLYMYXIN B SULFATE AND TRIMETHOPRIM 1; 10000 MG/ML; [USP'U]/ML
1 SOLUTION OPHTHALMIC EVERY 4 HOURS
Qty: 10 ML | Refills: 0 | Status: SHIPPED
Start: 2018-08-02 | End: 2019-12-31

## 2018-08-15 ENCOUNTER — APPOINTMENT (OUTPATIENT)
Dept: PHYSICAL THERAPY | Facility: MEDICAL CENTER | Age: 63
End: 2018-08-15
Attending: NURSE PRACTITIONER
Payer: COMMERCIAL

## 2018-08-15 ENCOUNTER — PATIENT MESSAGE (OUTPATIENT)
Dept: MEDICAL GROUP | Facility: MEDICAL CENTER | Age: 63
End: 2018-08-15

## 2018-08-15 RX ORDER — FLUCONAZOLE 150 MG/1
150 TABLET ORAL ONCE
Qty: 1 TAB | Refills: 0 | Status: SHIPPED | OUTPATIENT
Start: 2018-08-15 | End: 2018-08-15

## 2018-08-15 NOTE — TELEPHONE ENCOUNTER
From: Che Tovar  To: ONELIA Johnson  Sent: 8/15/2018 8:34 AM PDT  Subject: Non-Urgent Medical Question    I got a yeast infection about 3 weeks ago and treated it with Monistat 1. but its back again.  could I get a prescription for something stronger? this happened once before and my DrJamey prescribed    something that cleared it right up.

## 2018-08-17 ENCOUNTER — PATIENT MESSAGE (OUTPATIENT)
Dept: MEDICAL GROUP | Facility: MEDICAL CENTER | Age: 63
End: 2018-08-17

## 2018-08-17 NOTE — TELEPHONE ENCOUNTER
From: Che Tovar  To: ONELIA Johnson  Sent: 8/17/2018 10:42 AM PDT  Subject: Non-Urgent Medical Question    I was just thinking that I haven't had a diabetic eye exam in a while. how often do I have to have that done?

## 2018-08-23 ENCOUNTER — PHYSICAL THERAPY (OUTPATIENT)
Dept: PHYSICAL THERAPY | Facility: MEDICAL CENTER | Age: 63
End: 2018-08-23
Attending: NURSE PRACTITIONER
Payer: COMMERCIAL

## 2018-08-23 DIAGNOSIS — M25.551 RIGHT HIP PAIN: ICD-10-CM

## 2018-08-23 PROCEDURE — 97140 MANUAL THERAPY 1/> REGIONS: CPT

## 2018-08-23 NOTE — OP THERAPY DAILY TREATMENT
Outpatient Physical Therapy  DAILY TREATMENT     [facility]  [address]  [place] [address]  Phone:  [phone]  Fax:  [phone]    Date: 08/23/2018    Patient: Che Tovar  YOB: 1955  MRN: [id number]     Time Calculation  Start time: 1500  Stop time: 1530 Time Calculation (min): 30 minutes     Chief Complaint: Back Problem    Visit #: 5    SUBJECTIVE:  Patient seen for follow up on R hip and low back symptoms. She is doing bit better overall. She did well on vacation. She has been doing a lot of walking. Trying to keep up activity here back home   OBJECTIVE:  Current objective measures:           Therapeutic Treatments and Modalities:     1. Manual Therapy (CPT 40824), Low back R hip, Prone STM to erectors, QL and glute med. L SL, glute med peel to R hip, iso contraction to TFL and glute max. Belted hi mobs for increase posterior capsule space.     Time-based treatments/modalities:  Manual therapy minutes (CPT 68979): 30 minutes       Pain rating before treatment: 0  Pain rating after treatment: 0    ASSESSMENT:   Response to treatment:  Work on exercises for hip flexor. Will continue to work as able.  would like to progress to some more active stability exercises if able.   PLAN/RECOMMENDATIONS:   Plan for treatment: therapy treatment to continue next visit.  Planned interventions for next visit: continue with current treatment.

## 2018-09-14 ENCOUNTER — NON-PROVIDER VISIT (OUTPATIENT)
Dept: MEDICAL GROUP | Facility: MEDICAL CENTER | Age: 63
End: 2018-09-14
Payer: COMMERCIAL

## 2018-09-14 DIAGNOSIS — Z12.11 ENCOUNTER FOR FIT (FECAL IMMUNOCHEMICAL TEST) SCREENING: Primary | ICD-10-CM

## 2018-09-14 DIAGNOSIS — Z23 NEED FOR VACCINATION: ICD-10-CM

## 2018-09-14 PROCEDURE — 90746 HEPB VACCINE 3 DOSE ADULT IM: CPT | Performed by: NURSE PRACTITIONER

## 2018-09-14 PROCEDURE — 90471 IMMUNIZATION ADMIN: CPT | Performed by: NURSE PRACTITIONER

## 2018-09-18 ENCOUNTER — OFFICE VISIT (OUTPATIENT)
Dept: MEDICAL GROUP | Facility: MEDICAL CENTER | Age: 63
End: 2018-09-18
Payer: COMMERCIAL

## 2018-09-18 ENCOUNTER — HOSPITAL ENCOUNTER (OUTPATIENT)
Dept: RADIOLOGY | Facility: MEDICAL CENTER | Age: 63
End: 2018-09-18
Attending: NURSE PRACTITIONER
Payer: COMMERCIAL

## 2018-09-18 VITALS
SYSTOLIC BLOOD PRESSURE: 126 MMHG | HEIGHT: 60 IN | OXYGEN SATURATION: 100 % | HEART RATE: 84 BPM | WEIGHT: 139 LBS | DIASTOLIC BLOOD PRESSURE: 80 MMHG | BODY MASS INDEX: 27.29 KG/M2 | TEMPERATURE: 96.9 F | RESPIRATION RATE: 16 BRPM

## 2018-09-18 DIAGNOSIS — Z23 NEED FOR INFLUENZA VACCINATION: ICD-10-CM

## 2018-09-18 DIAGNOSIS — M25.552 LEFT HIP PAIN: ICD-10-CM

## 2018-09-18 DIAGNOSIS — Z12.11 SCREENING FOR COLON CANCER: ICD-10-CM

## 2018-09-18 DIAGNOSIS — E11.9 TYPE 2 DIABETES MELLITUS WITHOUT COMPLICATION, WITHOUT LONG-TERM CURRENT USE OF INSULIN (HCC): ICD-10-CM

## 2018-09-18 PROCEDURE — 73501 X-RAY EXAM HIP UNI 1 VIEW: CPT | Mod: LT

## 2018-09-18 PROCEDURE — 99214 OFFICE O/P EST MOD 30 MIN: CPT | Mod: 25 | Performed by: NURSE PRACTITIONER

## 2018-09-18 PROCEDURE — 90471 IMMUNIZATION ADMIN: CPT | Performed by: NURSE PRACTITIONER

## 2018-09-18 PROCEDURE — 90686 IIV4 VACC NO PRSV 0.5 ML IM: CPT | Performed by: NURSE PRACTITIONER

## 2018-09-18 RX ORDER — IRBESARTAN 75 MG/1
75 TABLET ORAL DAILY
Qty: 30 TAB | Refills: 2 | Status: SHIPPED | OUTPATIENT
Start: 2018-09-18 | End: 2019-04-16 | Stop reason: SDUPTHER

## 2018-09-18 ASSESSMENT — PATIENT HEALTH QUESTIONNAIRE - PHQ9: CLINICAL INTERPRETATION OF PHQ2 SCORE: 0

## 2018-09-18 NOTE — ASSESSMENT & PLAN NOTE
Stable, continue compliant with medication, due for labs this month  Denies polyuria, polydipsia, numbness or tingling in extremities

## 2018-09-18 NOTE — ASSESSMENT & PLAN NOTE
Patient had long-standing difficulty with hip pain, typically worse on the right.  However, she fell about 2 months ago in her garage after having missed a stair, landed on the left posterior hip.  The left side is now causing her much marked discomfort.  Pain is in the posterior buttock, aggravated by movement or long periods of standing, walking.  Some relief with rest.  She has done physical therapy in the past which is helpful in the right hip, she is interested in referral.  Prior imaging did note mild osteoarthritis  Denies difficulty bearing weight, joint swelling, change in range of motion or strength.  No significant back pain

## 2018-09-18 NOTE — PROGRESS NOTES
subjective:     Chief Complaint   Patient presents with   • Hip Injury     LEFT HIP//GETTING WORSE//3 MONTHS     Che Tovar is a 63 y.o. female here today to follow up on:    Type 2 diabetes mellitus without complication (CMS-[facility])  Stable, continue compliant with medication, due for labs this month  She has been on low-dose of losartan to protect her kidneys, feels that this is causing a cough.  The cough has been thoroughly explored including PFT, allergy testing, trial of PPI.  None of these seem to have an impact.  Denies polyuria, polydipsia, numbness or tingling in extremities    Left hip pain  Patient had long-standing difficulty with hip pain, typically worse on the right.  However, she fell about 2 months ago in her garage after having missed a stair, landed on the left posterior hip.  The left side is now causing her much marked discomfort.  Pain is in the posterior buttock, aggravated by movement or long periods of standing, walking.  Some relief with rest.  She has done physical therapy in the past which is helpful in the right hip, she is interested in referral.  Prior imaging did note mild osteoarthritis  Denies difficulty bearing weight, joint swelling, change in range of motion or strength.  No significant back pain       Current medicines (including changes today)  Current Outpatient Prescriptions   Medication Sig Dispense Refill   • irbesartan (AVAPRO) 75 MG tablet Take 1 Tab by mouth every day. 30 Tab 2   • polymixin-trimethoprim (POLYTRIM) 19094-5.1 UNIT/ML-% Solution Place 1 Drop in both eyes every 4 hours. 10 mL 0   • levothyroxine (SYNTHROID) 50 MCG Tab TAKE ONE TABLET BY MOUTH EVERY MORNING ON AN EMPTY STOMACH 90 Tab 1   • pravastatin (PRAVACHOL) 40 MG tablet Take 1 Tab by mouth every day. 90 Tab 1   • metFORMIN ER (GLUCOPHAGE XR) 500 MG TABLET SR 24 HR Take 1 Tab by mouth 3 times a day, with meals. 270 Tab 1   • losartan (COZAAR) 25 MG Tab Take 1 Tab by mouth every day. 30 Tab 11    • traZODone (DESYREL) 50 MG Tab Take 1 Tab by mouth every bedtime. 30 Tab 11   • polymixin-trimethoprim (POLYTRIM) 82051-6.1 UNIT/ML-% Solution Place 1 Drop in both eyes 4 times a day. 10 mL 0   • tobramycin (TOBREX) 0.3 % Solution ophthalmic solution Place 1 Drop in both eyes every 4 hours. While awake for 5-7 days until symptoms. 1 Bottle 0   • azelastine (ASTELIN) 137 MCG/SPRAY nasal spray [place] 1 Spray in nose 2 times a day. 30 mL 1   • Blood Glucose Monitoring Suppl Supplies Misc Test strips order: Test strips for One Touch Ultra meter. Sig: use once daily 100 Each 3   • Lancets Misc Lancets order: Lancets for One Touch Ultra meter. Sig: use once daily 100 Each 3   • Blood Glucose Monitoring Suppl Device Meter: Dispense Device of Insurance Preference. Sig. Use as directed for blood sugar monitoring. #1. NR. 1 Device 0   • cyclobenzaprine (FLEXERIL) 10 MG Tab Take one-half tablet orally as needed for spasms at night     • levothyroxine (SYNTHROID) 75 MCG Tab Take 1 tablet by mouth daily     • Colchicine 0.6 MG Cap Take  by mouth.       No current facility-administered medications for this visit.      She  has a past medical history of Allergy; Anxiety; Diabetes (HCC); and Thyroid disease.    ROS included above     Objective:     Blood pressure 126/80, pulse 84, temperature 36.1 °C (96.9 °F), resp. rate 16, height 1.524 m (5'), weight 63 kg (139 lb), SpO2 100 %. Body mass index is 27.15 kg/m².     Physical Exam:  General: Alert, oriented in no acute distress.  Eye contact is good, speech is normal, affect calm  Lungs: clear to auscultation bilaterally, normal effort, no wheeze/ rhonchi/ rales.  CV: regular rate and rhythm, S1, S2, no murmur  Abdomen: soft, nontender  MS:  no tenderness over the lumbar spine, no tenderness over the left hip joint.  Normal flexion and extension, normal external rotation.  Normal gait  Ext: no edema, color normal, vascularity normal, temperature normal    Assessment and Plan:    The following treatment plan was discussed   1. Left hip pain   patient with known arthritis, history of fall about 2 months ago with continued pain.  We will update x-ray, she is found physical therapy to be helpful in the past.  New referral placed.  If not gradually resolving  Patient may contact me for referral to orthopedic   DX-HIP-UNILATERAL-WITH PELVIS-1 VIEW LEFT    REFERRAL TO PHYSICAL THERAPY Reason for Therapy: Eval/Treat/Report   2. Type 2 diabetes mellitus without complication, without long-term current use of insulin ([facility])   stable.  She has had chronic cough which she attributes to the losartan, this is been thoroughly evaluated-she has been given trial of allergy medications, PPIs, inhaler, had PFT all of which was normal.  The cough lingers.  Although this is an uncommon side effect it is a possibility.  Will try stopping the losartan and switch to irbesartan, I do not see cough as listed side effect for this and would like to avoid ace inhibitors.  irbesartan (AVAPRO) 75 MG tablet    REFERRAL TO OPHTHALMOLOGY    MICROALBUMIN CREAT RATIO URINE   3. Screening for colon cancer  COLOGUARD (FIT DNA)   4. Need for influenza vaccination  I have placed the below orders and discussed them with an approved delegating provider. The MA is performing the below orders under the direction of Dr. Randall  Flu Quad Inj >3 Year Pre-Filled PF       Followup: pending labs         Please note that this dictation was created using voice recognition software. I have worked with consultants from the vendor as well as technical experts from [facility] to optimize the interface. I have made every reasonable attempt to correct obvious errors, but I expect that there are errors of grammar and possibly content that I did not discover before finalizing the note.

## 2018-09-24 ENCOUNTER — HOSPITAL ENCOUNTER (OUTPATIENT)
Dept: RADIOLOGY | Facility: MEDICAL CENTER | Age: 63
End: 2018-09-24
Attending: NURSE PRACTITIONER
Payer: COMMERCIAL

## 2018-09-24 DIAGNOSIS — Z12.31 VISIT FOR SCREENING MAMMOGRAM: ICD-10-CM

## 2018-09-24 DIAGNOSIS — Z78.0 POSTMENOPAUSAL STATUS: ICD-10-CM

## 2018-09-24 PROCEDURE — 77067 SCR MAMMO BI INCL CAD: CPT

## 2018-09-24 PROCEDURE — 77080 DXA BONE DENSITY AXIAL: CPT

## 2018-09-25 ENCOUNTER — TELEPHONE (OUTPATIENT)
Dept: PHYSICAL THERAPY | Facility: MEDICAL CENTER | Age: 63
End: 2018-09-25

## 2018-09-25 NOTE — OP THERAPY DISCHARGE SUMMARY
Outpatient Physical Therapy  DISCHARGE SUMMARY NOTE      [facility]  [address]  [place] [address]  Phone:  [phone]  Fax:  [phone]    Date of Visit: 09/25/2018    Patient: Che Tovar  YOB: 1955  MRN: [id number]     Referring Provider: No referring provider defined for this encounter.   Referring Diagnosis No admission diagnoses are documented for this encounter.     Physical Therapy Occurrence Codes    Date of onset of impairment:  6/4/18   Date physical therapy care plan established or reviewed:  6/26/18   Date physical therapy treatment started:  6/26/18          Functional Limitation G-Codes and Severity Modifiers      Goal:     Discharge:         Your patient is being discharged from Physical Therapy with the following comments:   · Goals met  · Patient has failed to schedule or reschedule follow-up visits    Comments:  Patient was being seen consistently for low back and R hip pain. She was last seen 8/23/18. Per protocol she will need a new script. Patient is scheduled for evaluation for new body part on 9/28/18    Limitations Remaining:  Trunk and hip weakness. Stiffness in the back     Recommendations:  HEP as needed. Follow up with PCP as needed.     Mc Zarate, PT, DPT    Date: 9/25/2018

## 2018-09-28 ENCOUNTER — PHYSICAL THERAPY (OUTPATIENT)
Dept: PHYSICAL THERAPY | Facility: MEDICAL CENTER | Age: 63
End: 2018-09-28
Attending: NURSE PRACTITIONER
Payer: COMMERCIAL

## 2018-09-28 ENCOUNTER — APPOINTMENT (OUTPATIENT)
Dept: PHYSICAL THERAPY | Facility: MEDICAL CENTER | Age: 63
End: 2018-09-28
Payer: COMMERCIAL

## 2018-09-28 DIAGNOSIS — M25.552 LEFT HIP PAIN: ICD-10-CM

## 2018-09-28 PROCEDURE — 97014 ELECTRIC STIMULATION THERAPY: CPT

## 2018-09-28 PROCEDURE — 97162 PT EVAL MOD COMPLEX 30 MIN: CPT

## 2018-09-28 ASSESSMENT — ENCOUNTER SYMPTOMS
PAIN SCALE AT HIGHEST: 8
QUALITY: SHOOTING
PAIN SCALE AT LOWEST: 3
PAIN TIMING: WHEN ACTIVE
QUALITY: TIGHTNESS
ALLEVIATING FACTOR: CHANGING POSITION
PAIN TIMING: OCCASIONAL
QUALITY: PULLING
PAIN SCALE: 5
QUALITY: ACHING

## 2018-09-28 NOTE — OP THERAPY EVALUATION
"  Outpatient Physical Therapy  INITIAL EVALUATION    [facility] Outpatient Physical Therapy  [address]  [place] [address]  Phone:  [phone]  Fax:  [phone]    Date of Evaluation: 2018    Patient: Che Tovar  YOB: 1955  MRN: [id number]     Referring Provider: ONELIA Johnson  [address]  [address]  [place] [address]   Referring Diagnosis Pain in right hip [M25.551]     Time Calculation  Start time: 1035  Stop time: 1130 Time Calculation (min): 55 minutes     Physical Therapy Occurrence Codes    Date of onset of impairment:  18   Date physical therapy care plan established or reviewed:  18   Date physical therapy treatment started:  18          Chief Complaint: No chief complaint on file.    Visit Diagnoses     ICD-10-CM   1. Left hip pain M25.552         Subjective   History of Present Illness:     History of chief complaint:  Che, goes by \"Concetat\" presents to the clinic today with complaint of mild to moderate achy L hip/tail bone pain. She recently moved her from [place] 1 year ago.  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 She is familiar to this clinic fro her L hip and low back. Approx 2 months ago she reported she took a mis step off some stairs and fell on her butt/L hip. She is having pain to sit and standing. Walking around is ok but will \"catch it\" at times.     Pain:     Current pain ratin    At best pain rating:  3    At worst pain ratin    Quality:  Aching, tightness, pulling and shooting    Pain timing:  Occasional and when active    Aggravating factors:  Sitting for a long time longer than an hour    Relieving factors:  Changing position     Progression:  Unchanged        Past Medical History:   Diagnosis Date   • Allergy    • Anxiety    • Diabetes (HCC)    • Thyroid disease      Past Surgical History:   Procedure Laterality Date   • ABDOMINAL " EXPLORATION     • EYE SURGERY     • GASTRIC BYPASS LAPAROSCOPIC     • HERNIA REPAIR         Precautions:       Objective   Observation and functional movement:  Walks without AD. No antaglic limp or movement. She has no limitation with most functional tasks. Sits heavy on R side.     Range of motion and strength:    Active range of motion is within functional limits.    Strength is within functional limits.    Sensation and reflexes:     Sensation is intact.      Reflexes are normal and symmetrical.    Palpation and joint mobility:     No tenderness to palpation noted.    Joint mobility is normal.    Tender to Hamstring insertion to ischial tubs    Special tests:      SI compression Neg  SI gapping neg     Balance:     No balance deficits noted.    Gait:      Normal pattern gait.    Short step on L dues to poor hip ext, lyla shifted more towards R hip     Coordination and tone:     Coordination is intact.    Tone is normal.    Basic self care and IADL's:     Independent with all self care.    Independent with all ADL's.    Cognition and visual perception:     No cognition deficits noted.    No visual perception deficits noted.            Therapeutic Exercises (CPT 00442):     1. Develop HEP, handout given, See below       Therapeutic Exercise Summary: Access Code: [email or web address]   URL: [email or web address]   Date: 09/28/2018   Prepared by: Mc Zarate     Exercises  · Supine Hamstring Stretch - 10 reps - 1 sets - 1x daily - 3-4x weekly  · Supine Bridge - 10 reps - 1 sets - 1x daily - 3-4x weekly  · Single Leg Cone Touch - 5-7 reps - 1 sets - 1x daily - 3-4x weekly      Therapeutic Treatments and Modalities:     1. E Stim Unattended (CPT 97483), L hip, low back    Time-based treatments/modalities:          Assessment, Response and Plan:   Assessment details:  Concetta presents with new L hip and back pain following a fall. She feels the worse when sleeping at night and after sitting for an hour+. Her R  posterior/lateral hip capsule and ischial tuberosity are tender to palpation. She likely has some common degeneration and added poor trunk and hip activation to support good posture and position. She would benefit from for hip and trunk stability and body mechanics and awareness for help in reducing pain.    Goals:   Short Term Goals:   1. Establish HEP  2. Decrease Symptoms 25% via Subjective report/Objective pain scale  3. Improve sleep 25% via subjective report  4. Patient to show neutral spine bracing in supine with UE/LE   Short term goal time span:  4-6 weeks      Long Term Goals:    1. Ind in HEP   2. Decrease symptoms 50% via subjective report/Objective pain scale  3. Decrease [place] score via 5 points  4. Improve sleep 50% via subjective report  5. Patient to show bracing neutral in standing  6. Patient show good hip hinge for squat and deadlift pattern  Long term goal time span:  1-2 months    Plan:   Therapy options:  Physical therapy treatment to continue  Planned therapy interventions:  E Stim Unattended (CPT 46238), Mechanical Traction (CPT 97824), Neuromuscular Re-education (CPT 14133) and Therapeutic Exercise (CPT 40974)  Frequency:  2x week  Duration in weeks:  6  Duration in visits:  12  Plan details:  2 x week for 1 visits per script      Functional Limitation G-Codes and Severity Modifiers      Current:     Goal:       Referring provider co-signature:  I have reviewed this plan of care and my co-signature certifies the need for services.  Certification Dates:   From 9/28/18   To 11/30/18    Physician Signature: ________________________________ Date: ______________

## 2018-10-05 ENCOUNTER — PHYSICAL THERAPY (OUTPATIENT)
Dept: PHYSICAL THERAPY | Facility: MEDICAL CENTER | Age: 63
End: 2018-10-05
Attending: NURSE PRACTITIONER
Payer: COMMERCIAL

## 2018-10-05 ENCOUNTER — APPOINTMENT (OUTPATIENT)
Dept: MEDICAL GROUP | Facility: MEDICAL CENTER | Age: 63
End: 2018-10-05
Payer: COMMERCIAL

## 2018-10-05 DIAGNOSIS — M25.552 LEFT HIP PAIN: ICD-10-CM

## 2018-10-05 PROCEDURE — 97012 MECHANICAL TRACTION THERAPY: CPT

## 2018-10-05 PROCEDURE — 97110 THERAPEUTIC EXERCISES: CPT

## 2018-10-05 PROCEDURE — 97140 MANUAL THERAPY 1/> REGIONS: CPT

## 2018-10-05 NOTE — OP THERAPY DAILY TREATMENT
Outpatient Physical Therapy  DAILY TREATMENT     [facility] Outpatient Physical Therapy  [address]  [place] [address]  Phone:  [phone]  Fax:  [phone]    Date: 10/05/2018    Patient: Che Tovar  YOB: 1955  MRN: [id number]     Time Calculation  Start time: 1000  Stop time: 1045 Time Calculation (min): 45 minutes     Chief Complaint: Back Problem and Hip Problem    Visit #: 2    SUBJECTIVE:  Patient seen for follow up on L hip and low back symptoms. L hamstring has been troublesome.     Current objective measures:           Therapeutic Exercises (CPT 43924):     1. Ball hs curls    2. Ecctric sliding HS lowers, use of pillow case     Therapeutic Treatments and Modalities:     1. Manual Therapy (CPT 59453), Low back R hip, Prone STM to erectors, QL and glute med. L SL, glute med peel to R hip, iso contraction to TFL and glute max. Belted hip mobs for increase posterior capsule space. Orthodox floss high hasmtring with active HS stretch    2. Mechanical Traction (CPT 68751), Low back, MHP trial 65-45 pull x 10 mins    Time-based treatments/modalities:  Manual therapy minutes (CPT 32809): 20 minutes  Therapeutic exercise minutes (CPT 86541): 10 minutes       Pain rating before treatment: 0  Pain rating after treatment: 0    ASSESSMENT:   Response to treatment:  Work on exercises for hip flexor. Will continue to work as able.  would like to progress to some more active stability exercises if able.   PLAN/RECOMMENDATIONS:   Plan for treatment: therapy treatment to continue next visit.  Planned interventions for next visit: continue with current treatment.

## 2018-10-10 ENCOUNTER — TELEPHONE (OUTPATIENT)
Dept: MEDICAL GROUP | Facility: MEDICAL CENTER | Age: 63
End: 2018-10-10

## 2018-10-10 ENCOUNTER — PHYSICAL THERAPY (OUTPATIENT)
Dept: PHYSICAL THERAPY | Facility: MEDICAL CENTER | Age: 63
End: 2018-10-10
Attending: NURSE PRACTITIONER
Payer: COMMERCIAL

## 2018-10-10 DIAGNOSIS — E11.9 TYPE 2 DIABETES MELLITUS WITHOUT COMPLICATION, WITHOUT LONG-TERM CURRENT USE OF INSULIN (HCC): ICD-10-CM

## 2018-10-10 DIAGNOSIS — M25.552 LEFT HIP PAIN: ICD-10-CM

## 2018-10-10 DIAGNOSIS — M25.551 RIGHT HIP PAIN: ICD-10-CM

## 2018-10-10 PROCEDURE — 97012 MECHANICAL TRACTION THERAPY: CPT

## 2018-10-10 PROCEDURE — 97110 THERAPEUTIC EXERCISES: CPT

## 2018-10-10 PROCEDURE — 97140 MANUAL THERAPY 1/> REGIONS: CPT

## 2018-10-10 NOTE — OP THERAPY DAILY TREATMENT
Outpatient Physical Therapy  DAILY TREATMENT     [facility] Outpatient Physical Therapy  [address]  [place] [address]  Phone:  [phone]  Fax:  [phone]    Date: 10/10/2018    Patient: Che Tovar  YOB: 1955  MRN: [id number]     Time Calculation  Start time: 1000  Stop time: 1045 Time Calculation (min): 45 minutes     Chief Complaint: Back Injury and Hip Problem    Visit #: 3    SUBJECTIVE:  Patient seen for follow up on L hip and low back symptoms. L hamstring has been troublesome. It has been slight better She has family visiting and was walking quite a bit. Her back and L leg are a bit more sore     Current objective measures:           Therapeutic Exercises (CPT 51799):     1. Ball hs curls    2. Ecctric sliding HS lowers, use of pillow case     Therapeutic Treatments and Modalities:     1. Manual Therapy (CPT 31555), Low back R hip, Side lying hip mob, hs floss with Christianity band. Iso metric holds in SL.     2. Mechanical Traction (CPT 57490), Low back, MHP trial 70-50 pull x 15 mins    Time-based treatments/modalities:  Manual therapy minutes (CPT 93875): 20 minutes  Therapeutic exercise minutes (CPT 29112): 10 minutes       Pain rating before treatment: 0  Pain rating after treatment: 0    ASSESSMENT:   Response to treatment: Some problem using hs to push leg into ext. If feeling good will try a couple of active things next visit    PLAN/RECOMMENDATIONS:   Plan for treatment: therapy treatment to continue next visit.  Planned interventions for next visit: continue with current treatment.

## 2018-10-12 ENCOUNTER — PHYSICAL THERAPY (OUTPATIENT)
Dept: PHYSICAL THERAPY | Facility: MEDICAL CENTER | Age: 63
End: 2018-10-12
Attending: NURSE PRACTITIONER
Payer: COMMERCIAL

## 2018-10-12 DIAGNOSIS — M25.551 RIGHT HIP PAIN: ICD-10-CM

## 2018-10-12 DIAGNOSIS — M25.552 LEFT HIP PAIN: ICD-10-CM

## 2018-10-12 PROCEDURE — 97012 MECHANICAL TRACTION THERAPY: CPT

## 2018-10-12 PROCEDURE — 97140 MANUAL THERAPY 1/> REGIONS: CPT

## 2018-10-12 PROCEDURE — 97110 THERAPEUTIC EXERCISES: CPT

## 2018-10-12 NOTE — OP THERAPY DAILY TREATMENT
Outpatient Physical Therapy  DAILY TREATMENT     [facility] Outpatient Physical Therapy  [address]  [place] [address]  Phone:  [phone]  Fax:  [phone]    Date: 10/12/2018    Patient: Che Tovar  YOB: 1955  MRN: [id number]     Time Calculation  Start time: 1000  Stop time: 1045 Time Calculation (min): 45 minutes     Chief Complaint: Back Problem and Hip Problem    Visit #: 4    SUBJECTIVE:  Patient seen for follow up on L hip and low back symptoms. L hamstring has been troublesome. It has been slight better She has family visiting and was walking quite a bit. Her back and L leg are a bit more sore     Current objective measures:           Therapeutic Exercises (CPT 69760):     1. Lateral groin sliders , arm length away    3. Hip circuit, Groin, hs, hip flexor and glute stret    Therapeutic Treatments and Modalities:     1. Manual Therapy (CPT 00727), Low back R hip, Side lying hip mob, hs floss with Yarsani band. Iso metric holds in SL.     2. Mechanical Traction (CPT 23102), Low back, MHP trial 70-50 pull x 15 mins    Time-based treatments/modalities:  Manual therapy minutes (CPT 46834): 20 minutes  Therapeutic exercise minutes (CPT 81866): 10 minutes       Pain rating before treatment: 0  Pain rating after treatment: 0    ASSESSMENT:   Response to treatment: Doing pretty good. Will increase activity as able.   PLAN/RECOMMENDATIONS:   Plan for treatment: therapy treatment to continue next visit.  Planned interventions for next visit: continue with current treatment.

## 2018-10-16 ENCOUNTER — PHYSICAL THERAPY (OUTPATIENT)
Dept: PHYSICAL THERAPY | Facility: MEDICAL CENTER | Age: 63
End: 2018-10-16
Attending: NURSE PRACTITIONER
Payer: COMMERCIAL

## 2018-10-16 DIAGNOSIS — M25.552 LEFT HIP PAIN: ICD-10-CM

## 2018-10-16 DIAGNOSIS — M25.551 RIGHT HIP PAIN: ICD-10-CM

## 2018-10-16 PROCEDURE — 97140 MANUAL THERAPY 1/> REGIONS: CPT

## 2018-10-16 PROCEDURE — 97012 MECHANICAL TRACTION THERAPY: CPT

## 2018-10-16 PROCEDURE — 97110 THERAPEUTIC EXERCISES: CPT

## 2018-10-16 NOTE — OP THERAPY DAILY TREATMENT
Outpatient Physical Therapy  DAILY TREATMENT     [facility] Outpatient Physical Therapy  [address]  [place] [address]  Phone:  [phone]  Fax:  [phone]    Date: 10/16/2018    Patient: Che Tovar  YOB: 1955  MRN: [id number]     Time Calculation  Start time: 0930  Stop time: 1015 Time Calculation (min): 45 minutes     Chief Complaint: Back Problem and Hip Problem    Visit #: 5    SUBJECTIVE:  Patient seen for follow up on L hip and low back symptoms. L hamstring has been troublesome. It has been slight better She has family visiting and was walking quite a bit. Her back and L leg are a bit more sore     Current objective measures:           Therapeutic Exercises (CPT 14091):     1. Nu step, Level 4 x 5 mins     2. Active gastroc and hs stretching    3. Bird dog , oppo UE/LE    Therapeutic Treatments and Modalities:     1. Manual Therapy (CPT 33005), Low back R hip, Side lying hip mob, hs floss with Shinto band. Iso metric holds in SL.     2. Mechanical Traction (CPT 60531), Low back, MHP trial 70-50 pull x 15 mins    Time-based treatments/modalities:  Manual therapy minutes (CPT 31545): 20 minutes  Therapeutic exercise minutes (CPT 08518): 10 minutes       Pain rating before treatment: 0  Pain rating after treatment: 0    ASSESSMENT:   Response to treatment: Doing pretty good. Will increase activity as able.   PLAN/RECOMMENDATIONS:   Plan for treatment: therapy treatment to continue next visit.  Planned interventions for next visit: continue with current treatment.

## 2018-10-22 ENCOUNTER — PHYSICAL THERAPY (OUTPATIENT)
Dept: PHYSICAL THERAPY | Facility: MEDICAL CENTER | Age: 63
End: 2018-10-22
Attending: NURSE PRACTITIONER
Payer: COMMERCIAL

## 2018-10-22 DIAGNOSIS — M25.552 LEFT HIP PAIN: ICD-10-CM

## 2018-10-22 DIAGNOSIS — M25.551 RIGHT HIP PAIN: ICD-10-CM

## 2018-10-22 PROCEDURE — 97012 MECHANICAL TRACTION THERAPY: CPT

## 2018-10-22 PROCEDURE — 97140 MANUAL THERAPY 1/> REGIONS: CPT

## 2018-10-22 NOTE — OP THERAPY DAILY TREATMENT
Outpatient Physical Therapy  DAILY TREATMENT     [facility] Outpatient Physical Therapy  [address]  [place] [address]  Phone:  [phone]  Fax:  [phone]    Date: 10/22/2018    Patient: Che Tovar  YOB: 1955  MRN: [id number]     Time Calculation  Start time: 1100  Stop time: 1145 Time Calculation (min): 45 minutes     Chief Complaint: Back Problem and Hip Problem    Visit #: 6    SUBJECTIVE:  Patient seen for follow up on L hip and low back symptoms. L hamstring has been troublesome. It has been slight better She has family visiting and was walking quite a bit. Her back and L leg are a bit more sore     Current objective measures:           Therapeutic Exercises (CPT 99819):     1. Nu step, Level 4 x 5 mins     2. Active gastroc and hs stretching    Therapeutic Treatments and Modalities:     1. Manual Therapy (CPT 80970), Low back R hip, Soft tissue work to Low back, Sideylying SI mobs     2. Mechanical Traction (CPT 26029), Low back, MHP trial 70-50 pull x 15 mins    Time-based treatments/modalities:  Manual therapy minutes (CPT 76786): 25 minutes       Pain rating before treatment: 0  Pain rating after treatment: 0    ASSESSMENT:   Response to treatment: Doing pretty good. Will increase activity as able.   PLAN/RECOMMENDATIONS:   Plan for treatment: therapy treatment to continue next visit.  Planned interventions for next visit: continue with current treatment.

## 2018-10-24 ENCOUNTER — PHYSICAL THERAPY (OUTPATIENT)
Dept: PHYSICAL THERAPY | Facility: MEDICAL CENTER | Age: 63
End: 2018-10-24
Attending: NURSE PRACTITIONER
Payer: COMMERCIAL

## 2018-10-24 DIAGNOSIS — M25.551 RIGHT HIP PAIN: ICD-10-CM

## 2018-10-24 DIAGNOSIS — M25.552 LEFT HIP PAIN: ICD-10-CM

## 2018-10-24 PROCEDURE — 97012 MECHANICAL TRACTION THERAPY: CPT

## 2018-10-24 PROCEDURE — 97140 MANUAL THERAPY 1/> REGIONS: CPT

## 2018-10-24 NOTE — OP THERAPY DAILY TREATMENT
Outpatient Physical Therapy  DAILY TREATMENT     [facility] Outpatient Physical Therapy  [address]  [place] [address]  Phone:  [phone]  Fax:  [phone]    Date: 10/24/2018    Patient: Che Tovar  YOB: 1955  MRN: [id number]     Time Calculation  Start time: 1130  Stop time: 1215 Time Calculation (min): 45 minutes     Chief Complaint: Back Problem and Hip Problem    Visit #: 7    SUBJECTIVE:  Patient seen for follow up on L hip and low back symptoms. L hamstring has been troublesome she feels this more in the groin. Her Low back R side have also been a consistent problem. Discuss the need for regular activity and some strengthening     Current objective measures:           Therapeutic Treatments and Modalities:     1. Manual Therapy (CPT 40194), Low back R hip, Soft tissue work to Low back, Sideylying SI mobs     2. Mechanical Traction (CPT 49608), Low back, MHP trial 80-60 pull x 15 mins    Time-based treatments/modalities:  Manual therapy minutes (CPT 35620): 30 minutes       Pain rating before treatment: 0  Pain rating after treatment: 0    ASSESSMENT:   Response to treatment: Doing ok. We are in a holding pattern now. She is sore but not bad. Want her to be more active and try some consistent stab. May send her back to her dr or to seek a specialist.     PLAN/RECOMMENDATIONS:   Plan for treatment: therapy treatment to continue next visit.  Planned interventions for next visit: continue with current treatment.

## 2018-10-30 ENCOUNTER — APPOINTMENT (OUTPATIENT)
Dept: PHYSICAL THERAPY | Facility: MEDICAL CENTER | Age: 63
End: 2018-10-30
Attending: NURSE PRACTITIONER
Payer: COMMERCIAL

## 2018-11-05 ENCOUNTER — APPOINTMENT (OUTPATIENT)
Dept: PHYSICAL THERAPY | Facility: MEDICAL CENTER | Age: 63
End: 2018-11-05
Attending: NURSE PRACTITIONER
Payer: COMMERCIAL

## 2018-11-07 ENCOUNTER — APPOINTMENT (OUTPATIENT)
Dept: PHYSICAL THERAPY | Facility: MEDICAL CENTER | Age: 63
End: 2018-11-07
Attending: NURSE PRACTITIONER
Payer: COMMERCIAL

## 2018-11-12 ENCOUNTER — APPOINTMENT (OUTPATIENT)
Dept: PHYSICAL THERAPY | Facility: MEDICAL CENTER | Age: 63
End: 2018-11-12
Attending: NURSE PRACTITIONER
Payer: COMMERCIAL

## 2018-11-14 ENCOUNTER — APPOINTMENT (OUTPATIENT)
Dept: PHYSICAL THERAPY | Facility: MEDICAL CENTER | Age: 63
End: 2018-11-14
Attending: NURSE PRACTITIONER
Payer: COMMERCIAL

## 2019-03-21 ENCOUNTER — PATIENT MESSAGE (OUTPATIENT)
Dept: MEDICAL GROUP | Facility: MEDICAL CENTER | Age: 64
End: 2019-03-21

## 2019-03-25 ENCOUNTER — HOSPITAL ENCOUNTER (OUTPATIENT)
Dept: LAB | Facility: MEDICAL CENTER | Age: 64
End: 2019-03-25
Attending: NURSE PRACTITIONER
Payer: MEDICARE

## 2019-03-25 DIAGNOSIS — E11.9 TYPE 2 DIABETES MELLITUS WITHOUT COMPLICATION, WITHOUT LONG-TERM CURRENT USE OF INSULIN (HCC): ICD-10-CM

## 2019-03-25 LAB
ALBUMIN SERPL BCP-MCNC: 4 G/DL (ref 3.2–4.9)
ALBUMIN/GLOB SERPL: 1.1 G/DL
ALP SERPL-CCNC: 64 U/L (ref 30–99)
ALT SERPL-CCNC: 17 U/L (ref 2–50)
ANION GAP SERPL CALC-SCNC: 7 MMOL/L (ref 0–11.9)
AST SERPL-CCNC: 19 U/L (ref 12–45)
BILIRUB SERPL-MCNC: 0.8 MG/DL (ref 0.1–1.5)
BUN SERPL-MCNC: 11 MG/DL (ref 8–22)
CALCIUM SERPL-MCNC: 9.5 MG/DL (ref 8.5–10.5)
CHLORIDE SERPL-SCNC: 103 MMOL/L (ref 96–112)
CHOLEST SERPL-MCNC: 183 MG/DL (ref 100–199)
CO2 SERPL-SCNC: 27 MMOL/L (ref 20–33)
CREAT SERPL-MCNC: 0.69 MG/DL (ref 0.5–1.4)
CREAT UR-MCNC: 129 MG/DL
FASTING STATUS PATIENT QL REPORTED: NORMAL
GLOBULIN SER CALC-MCNC: 3.6 G/DL (ref 1.9–3.5)
GLUCOSE SERPL-MCNC: 175 MG/DL (ref 65–99)
HDLC SERPL-MCNC: 58 MG/DL
LDLC SERPL CALC-MCNC: 101 MG/DL
MICROALBUMIN UR-MCNC: <0.7 MG/DL
MICROALBUMIN/CREAT UR: NORMAL MG/G (ref 0–30)
POTASSIUM SERPL-SCNC: 4.2 MMOL/L (ref 3.6–5.5)
PROT SERPL-MCNC: 7.6 G/DL (ref 6–8.2)
SODIUM SERPL-SCNC: 137 MMOL/L (ref 135–145)
T4 FREE SERPL-MCNC: 0.99 NG/DL (ref 0.53–1.43)
TRIGL SERPL-MCNC: 120 MG/DL (ref 0–149)
TSH SERPL DL<=0.005 MIU/L-ACNC: 3 UIU/ML (ref 0.38–5.33)

## 2019-03-25 PROCEDURE — 36415 COLL VENOUS BLD VENIPUNCTURE: CPT

## 2019-03-25 PROCEDURE — 80061 LIPID PANEL: CPT

## 2019-03-25 PROCEDURE — 83036 HEMOGLOBIN GLYCOSYLATED A1C: CPT | Mod: GA

## 2019-03-25 PROCEDURE — 82570 ASSAY OF URINE CREATININE: CPT

## 2019-03-25 PROCEDURE — 84439 ASSAY OF FREE THYROXINE: CPT

## 2019-03-25 PROCEDURE — 82043 UR ALBUMIN QUANTITATIVE: CPT

## 2019-03-25 PROCEDURE — 80053 COMPREHEN METABOLIC PANEL: CPT

## 2019-03-25 PROCEDURE — 84443 ASSAY THYROID STIM HORMONE: CPT

## 2019-03-27 ENCOUNTER — PATIENT MESSAGE (OUTPATIENT)
Dept: MEDICAL GROUP | Facility: MEDICAL CENTER | Age: 64
End: 2019-03-27

## 2019-03-27 ENCOUNTER — TELEPHONE (OUTPATIENT)
Dept: MEDICAL GROUP | Facility: MEDICAL CENTER | Age: 64
End: 2019-03-27

## 2019-03-27 DIAGNOSIS — E03.9 ACQUIRED HYPOTHYROIDISM: ICD-10-CM

## 2019-03-27 DIAGNOSIS — E11.9 TYPE 2 DIABETES MELLITUS WITHOUT COMPLICATION, WITHOUT LONG-TERM CURRENT USE OF INSULIN (HCC): ICD-10-CM

## 2019-03-27 RX ORDER — TRAZODONE HYDROCHLORIDE 50 MG/1
50 TABLET ORAL
Qty: 30 TAB | Refills: 11 | Status: SHIPPED | OUTPATIENT
Start: 2019-03-27 | End: 2019-08-22 | Stop reason: SDUPTHER

## 2019-03-27 RX ORDER — LANCETS 30 GAUGE
EACH MISCELLANEOUS
Qty: 100 EACH | Refills: 3 | Status: SHIPPED | OUTPATIENT
Start: 2019-03-27

## 2019-03-27 RX ORDER — LEVOTHYROXINE SODIUM 0.05 MG/1
50 TABLET ORAL
Qty: 90 TAB | Refills: 3 | Status: SHIPPED | OUTPATIENT
Start: 2019-03-27 | End: 2020-04-01

## 2019-03-27 NOTE — TELEPHONE ENCOUNTER
----- Message from ONELIA Johnson sent at 3/26/2019  5:13 PM PDT -----  Please call lab and ask about a1c, It has been pending for quite a while. Thanks.

## 2019-03-27 NOTE — TELEPHONE ENCOUNTER
From: Che Tovar  To: ONELIA Johnson  Sent: 3/27/2019 8:42 AM PDT  Subject: RE:Med Refill    I need lancets and my thyroid meds.  ----- Message -----  From: ONELIA Johnson  Sent: 3/27/2019 7:59 AM PDT  To: Concetta Tovar  Subject: RE:Med Refill  Are there any other medications you need immediately?    ----- Message -----   From: Concetta Tovar   Sent: 3/26/2019 6:29 PM PDT   To: Beckie Nunn Med Ass't  Subject: RE:Med Refill    I need my prescriptions refills approved. its been 3 days since I had my Trazdone.     ----- Message -----  From: Beckie Nnun Med Ass't  Sent: 3/21/2019 10:29 AM PDT  To: Concetta Tovar  Subject: RE:Med Refill  A request was sent to [facility], but unfortunately how I states in my last message, you would have to get your labs done first before renewal.  Thank you.        ----- Message -----   From: Concetta Tovar   Sent: 3/21/2019 9:57 AM PDT   To: Beckie Nunn Med Ass't  Subject: RE:Med Refill    I need my trazidone now.   ----- Message -----  From: Beckie uNnn Med Ass't  Sent: 3/21/2019 9:01 AM PDT  To: Concetta Tovar  Subject: Med Refill  Good morning Che,    We have received a few med requests from you pharmacy, unfortunately they can not be renewed until your labs get done. Allison ordered fasting labs for you in May.  Please get this done at your convenience so we can get your medications renewed.  If you have any questions please let us know.    Beckie Nunn  Medical Assistant

## 2019-03-27 NOTE — TELEPHONE ENCOUNTER
They are running all the A1C's that have been pending today. They should be done by end of day today. Due to the machice not being able to run.

## 2019-03-28 LAB
EST. AVERAGE GLUCOSE BLD GHB EST-MCNC: 163 MG/DL
HBA1C MFR BLD: 7.3 % (ref 0–5.6)

## 2019-04-16 DIAGNOSIS — E11.9 TYPE 2 DIABETES MELLITUS WITHOUT COMPLICATION, WITHOUT LONG-TERM CURRENT USE OF INSULIN (HCC): ICD-10-CM

## 2019-04-16 RX ORDER — IRBESARTAN 75 MG/1
TABLET ORAL
Qty: 30 TAB | Refills: 5 | Status: SHIPPED | OUTPATIENT
Start: 2019-04-16 | End: 2019-08-22 | Stop reason: SDUPTHER

## 2019-04-17 ENCOUNTER — OFFICE VISIT (OUTPATIENT)
Dept: MEDICAL GROUP | Facility: MEDICAL CENTER | Age: 64
End: 2019-04-17
Payer: MEDICARE

## 2019-04-17 VITALS
WEIGHT: 141 LBS | SYSTOLIC BLOOD PRESSURE: 110 MMHG | BODY MASS INDEX: 27.68 KG/M2 | HEART RATE: 69 BPM | RESPIRATION RATE: 16 BRPM | OXYGEN SATURATION: 100 % | HEIGHT: 60 IN | TEMPERATURE: 97.5 F | DIASTOLIC BLOOD PRESSURE: 80 MMHG

## 2019-04-17 DIAGNOSIS — E78.00 PURE HYPERCHOLESTEROLEMIA: ICD-10-CM

## 2019-04-17 DIAGNOSIS — E11.9 TYPE 2 DIABETES MELLITUS WITHOUT COMPLICATION, WITHOUT LONG-TERM CURRENT USE OF INSULIN (HCC): ICD-10-CM

## 2019-04-17 DIAGNOSIS — E03.9 ACQUIRED HYPOTHYROIDISM: ICD-10-CM

## 2019-04-17 DIAGNOSIS — Z12.39 ENCOUNTER FOR SCREENING BREAST EXAMINATION: ICD-10-CM

## 2019-04-17 DIAGNOSIS — F51.01 PRIMARY INSOMNIA: ICD-10-CM

## 2019-04-17 DIAGNOSIS — Z00.00 MEDICARE ANNUAL WELLNESS VISIT, INITIAL: ICD-10-CM

## 2019-04-17 PROCEDURE — G0402 INITIAL PREVENTIVE EXAM: HCPCS | Performed by: NURSE PRACTITIONER

## 2019-04-17 RX ORDER — METFORMIN HYDROCHLORIDE 500 MG/1
500 TABLET, EXTENDED RELEASE ORAL
Qty: 270 TAB | Refills: 1 | Status: SHIPPED | OUTPATIENT
Start: 2019-04-17 | End: 2020-02-18

## 2019-04-17 ASSESSMENT — ENCOUNTER SYMPTOMS: GENERAL WELL-BEING: GOOD

## 2019-04-17 ASSESSMENT — PATIENT HEALTH QUESTIONNAIRE - PHQ9: CLINICAL INTERPRETATION OF PHQ2 SCORE: 0

## 2019-04-17 ASSESSMENT — ACTIVITIES OF DAILY LIVING (ADL): BATHING_REQUIRES_ASSISTANCE: 0

## 2019-04-17 NOTE — LETTER
[facility]  JEWELL Johnson.  [address]  [facility] [address]  Fax: [phone]   Authorization for Release/Disclosure of   Protected Health Information   Name: ALEXI TOVAR : 1955 SSN: [id number]   Address: [address] [place] [address] Phone:    [phone] (home)    I authorize the entity listed below to release/disclose the PHI below to:   [facility]/ONELIA Johnson and ONELIA Johnson   Provider or Entity Name:  [facility]   Address   City, State, [facility]   Phone:      Fax:     Reason for request: continuity of care   Information to be released:    [  ] LAST COLONOSCOPY,  including any PATH REPORT and follow-up  [  ] LAST FIT/COLOGUARD RESULT [  ] LAST DEXA  [  ] LAST MAMMOGRAM  [  ] LAST PAP  [  ] LAST LABS [x  ] RETINA EXAM REPORT  [  ] IMMUNIZATION RECORDS  [  ] Release all info      [  ] Check here and initial the line next to each item to release ALL health information INCLUDING  _____ Care and treatment for drug and / or alcohol abuse  _____ HIV testing, infection status, or AIDS  _____ Genetic Testing    DATES OF SERVICE OR TIME PERIOD TO BE DISCLOSED: _____________  I understand and acknowledge that:  * This Authorization may be revoked at any time by you in writing, except if your health information has already been used or disclosed.  * Your health information that will be used or disclosed as a result of you signing this authorization could be re-disclosed by the recipient. If this occurs, your re-disclosed health information may no longer be protected by State or Federal laws.  * You may refuse to sign this Authorization. Your refusal will not affect your ability to obtain treatment.  * This Authorization becomes effective upon signing and will  on (date) __________.      If no date is indicated, this Authorization will  one (1) year from the signature date.    Name: Alexi Tovar    Signature:   Date:          4/17/2019       PLEASE FAX REQUESTED RECORDS BACK TO: [phone]

## 2019-04-17 NOTE — PROGRESS NOTES
"CC:  Wellness exam and follow up medical problems.    HPI:  Che \"Concetta\" is a 64-year-old established female patient here for initial Medicare well exam  Type 2 diabetes mellitus without complication (CMS-[facility])  Last A1c above goal at 7.3.  She is consistently taking metformin  mg 3 times daily, feels that the medication is making her lethargic.  Ultimately she would like to come off medication if possible.  She is now retired and has more time to focus on her lifestyle factors.  She is interested in taking a diabetes class as well as meeting with the dietitian  No polyuria, polydipsia, headaches.  Up-to-date on eye exam, records requested    Pure hypercholesterolemia  Well-controlled on pravastatin, tolerating medication without side effect including myalgia    Primary insomnia  Managing with trazodone which is working well    Acquired hypothyroidism  Doing well at this time on levothyroxine 50 mcg daily.    Depression Screening    Little interest or pleasure in doing things?  0 - not at all  Feeling down, depressed , or hopeless? 0 - not at all  Patient Health Questionnaire Score: 0     If depressive symptoms identified deferred to follow up visit unless specifically addressed in assessment and plan.    Interpretation of PHQ-9 Total Score   Score Severity   1-4 No Depression   5-9 Mild Depression   10-14 Moderate Depression   15-19 Moderately Severe Depression   20-27 Severe Depression    Screening for Cognitive Impairment    Three Minute Recall (village, kitchen, baby) 3/3    Bhupinder clock face with all 12 numbers and set the hands to show 10 past 10.  Yes    Cognitive concerns identified deferred for follow up unless specifically addressed in assessment and plan.    Fall Risk Assessment    Has the patient had two or more falls in the last year or any fall with injury in the last year?  Yes    Safety Assessment    Throw rugs on floor.  Yes  Handrails on all stairs.  Yes  Good lighting in all hallways.  " No  Difficulty hearing.  No  Patient counseled about all safety risks that were identified.    Functional Assessment ADLs    Are there any barriers preventing you from cooking for yourself or meeting nutritional needs?  No.    Are there any barriers preventing you from driving safely or obtaining transportation?  No.    Are there any barriers preventing you from using a telephone or calling for help?  No.    Are there any barriers preventing you from shopping?  No.    Are there any barriers preventing you from taking care of your own finances?  No.    Are there any barriers preventing you from managing your medications?  No.    Are there any barriers preventing you from showering, bathing or dressing yourself?  No.    Are you currently engaging in any exercise or physical activity?  Yes.     What is your perception of your health?  Good.      Health Maintenance Summary                DIABETES MONOFILAMENT / LE EXAM Overdue 1955     IMM ZOSTER VACCINES Overdue 1/6/2015      Done 11/11/2014 Imm Admin: Zoster Vaccine Live (ZVL) (Zostavax)    COLON CANCER SCREENING ANNUAL FIT Overdue 8/26/2018      Done 8/26/2017 OCCULT BLOOD FECES IMMUNOASSAY    RETINAL SCREENING Overdue 9/28/2018      Done 9/28/2017 REFERRAL FOR RETINAL SCREENING EXAM    IMM HEP B VACCINE Overdue 10/12/2018      Done 9/14/2018 Imm Admin: Hepatitis B Vaccine Recombivax (Adol/Adult)    IMM PNEUMOCOCCAL 65+ (ADULT) LOW/MEDIUM RISK SERIES Overdue 4/1/2019      Done 11/15/2006 Imm Admin: Pneumococcal polysaccharide vaccine (PPSV-23)    MAMMOGRAM Next Due 9/24/2019      Done 9/24/2018 MA-SCREEN MAMMO W/CAD-BILAT     Patient has more history with this topic...    A1C SCREENING Next Due 9/25/2019      Done 3/25/2019 HEMOGLOBIN A1C      Patient has more history with this topic...    FASTING LIPID PROFILE Next Due 3/25/2020      Done 3/25/2019 LIPID PROFILE      Patient has more history with this topic...    URINE ACR / MICROALBUMIN Next Due 3/25/2020       Done 3/25/2019 MICROALBUMIN CREAT RATIO URINE     Patient has more history with this topic...    SERUM CREATININE Next Due 3/25/2020      Done 3/25/2019 COMP METABOLIC PANEL      Patient has more history with this topic...    IMM DTaP/Tdap/Td Vaccine Next Due 7/9/2020      Done 7/9/2010 Imm Admin: Tdap Vaccine    BONE DENSITY Next Due 9/24/2023      Done 9/24/2018 DS-BONE DENSITY STUDY (DEXA)          Patient Care Team:  ONELIA Johnson as PCP - General (Family Medicine)  Mc Zarate, PT, DPT as Physical Therapy (Physical Therapy)      See chart problem list or referrals section for names of specialist.    Patient Active Problem List    Diagnosis Date Noted   • Left hip pain 09/18/2018   • Right hip pain 05/25/2018   • Chronic cough 01/09/2018   • Chronic rhinitis 01/09/2018   • Family history of breast cancer 08/17/2017   • S/P gastric bypass 08/17/2017   • Pure hypercholesterolemia 08/17/2017   • Type 2 diabetes mellitus without complication (HCC) 08/17/2017   • Acquired hypothyroidism 08/17/2017   • Primary insomnia 08/17/2017   • History of gout 08/17/2017   • Back strain 08/17/2017   • H/O sigmoidoscopy 08/17/2017       Current Outpatient Prescriptions on File Prior to Visit   Medication Sig Dispense Refill   • irbesartan (AVAPRO) 75 MG tablet TAKE ONE TABLET BY MOUTH DAILY 30 Tab 5   • ONE TOUCH ULTRA TEST strip USE AS DIRECTED TO TEST ONCE DAILY 100 Strip 2   • traZODone (DESYREL) 50 MG Tab Take 1 Tab by mouth every bedtime. 30 Tab 11   • Lancets Lancets order: Lancets for One Touch Ultra meter. Sig: use once daily 100 Each 3   • levothyroxine (SYNTHROID) 50 MCG Tab Take 1 Tab by mouth Every morning on an empty stomach. 90 Tab 3   • polymixin-trimethoprim (POLYTRIM) 75506-8.1 UNIT/ML-% Solution Place 1 Drop in both eyes every 4 hours. 10 mL 0   • pravastatin (PRAVACHOL) 40 MG tablet Take 1 Tab by mouth every day. 90 Tab 1   • polymixin-trimethoprim (POLYTRIM) 25014-5.1 UNIT/ML-% Solution Place 1  Drop in both eyes 4 times a day. 10 mL 0   • tobramycin (TOBREX) 0.3 % Solution ophthalmic solution Place 1 Drop in both eyes every 4 hours. While awake for 5-7 days until symptoms. 1 Bottle 0   • azelastine (ASTELIN) 137 MCG/SPRAY nasal spray Teller 1 Spray in nose 2 times a day. 30 mL 1   • Blood Glucose Monitoring Suppl Device Meter: Dispense Device of Insurance Preference. Sig. Use as directed for blood sugar monitoring. #1. NR. 1 Device 0   • cyclobenzaprine (FLEXERIL) 10 MG Tab Take one-half tablet orally as needed for spasms at night     • levothyroxine (SYNTHROID) 75 MCG Tab Take 1 tablet by mouth daily       No current facility-administered medications on file prior to visit.        Nsaids; Hydrocodone-acetaminophen; Lisinopril; Lovastatin; and Penicillins    Social History     Social History   • Marital status: Single     Spouse name: N/A   • Number of children: N/A   • Years of education: N/A     Occupational History   • Not on file.     Social History Main Topics   • Smoking status: Former Smoker     Years: 5.00   • Smokeless tobacco: Never Used      Comment: light smoker, quit 40 years ago   • Alcohol use 0.0 oz/week   • Drug use: No   • Sexual activity: Yes     Partners: Male     Other Topics Concern   • Not on file     Social History Narrative   • No narrative on file       Family History   Problem Relation Age of Onset   • Arthritis Mother    • Diabetes Mother    • Hypertension Mother    • Hyperlipidemia Mother    • Stroke Mother    • Arthritis Father    • Hyperlipidemia Father    • Hypertension Father    • Diabetes Father    • Arthritis Sister    • Cancer Sister    • Hypertension Sister    • Hyperlipidemia Sister    • Alcohol/Drug Sister    • Breast Cancer Sister    • Hyperlipidemia Brother    • Arthritis Brother    • Hypertension Brother    • Alcohol/Drug Brother    • Arthritis Sister    • Cancer Sister    • Hypertension Sister    • Hyperlipidemia Sister    • Breast Cancer Sister    • Arthritis  Sister    • Cancer Sister    • Hyperlipidemia Sister    • Breast Cancer Sister    • Arthritis Sister    • Cancer Sister    • Hyperlipidemia Sister    • Breast Cancer Sister    • Arthritis Sister    • Hyperlipidemia Sister    • Hyperlipidemia Sister    • Hyperlipidemia Sister    • Hyperlipidemia Brother    • Arthritis Brother    • Hyperlipidemia Brother    • Hyperlipidemia Sister        Past Surgical History:   Procedure Laterality Date   • ABDOMINAL EXPLORATION     • EYE SURGERY     • GASTRIC BYPASS LAPAROSCOPIC     • HERNIA REPAIR         ROS:  Denies any Headache, Blurred Vision, Confusion Chest pain,  Shortness of breath,  Abdominal pain, Changes of bowel or bladder, Lower ext edema, Fevers, Nights sweats, Weight Changes, Focal weakness or numbness.  All other systems are negative.    PHYSICAL:    /80 (BP Location: Left arm, Patient Position: Sitting, BP Cuff Size: Adult)   Pulse 69   Temp 36.4 °C (97.5 °F) (Temporal)   Resp 16   Ht 1.524 m (5')   Wt 64 kg (141 lb)   SpO2 100%     Gen:         Alert and oriented, No apparent distress.  HEENT:   Perrla, TM clear,  Oralpharynx no erythema or exudates.  Neck:       No Jugular venous distension, Lymphadenopathy, Thyromegaly, Bruits.  Lungs:     Clear to auscultation bilaterally  CV:          Regular rate and rhythm. No murmurs, rubs or gallops.  Abd:         Soft non tender, non distended. Normal active bowel sounds. No Hepatosplenomegaly, No pulsatile masses.                       Ext:          No clubbing, cyanosis, edema.  Neuro:     CN II-XII grossly intact.  Strength 5/5 throughout.  DTR's equal and                   symmetrical both upper and lower extremities. Down going Babinski.    MS:          Full range motion all joints no major deformities.  Skin:        No concerning lesions or rashes.    Health Maintenance Due   Topic Date Due   • DIABETES MONOFILAMENT / LE EXAM  1955   • IMM ZOSTER VACCINES (2 of 3) 01/06/2015   • COLON CANCER  SCREENING ANNUAL FIT  08/26/2018   • RETINAL SCREENING  09/28/2018   • IMM HEP B VACCINE (2 of 3 - Risk 3-dose series) 10/12/2018   • IMM PNEUMOCOCCAL 65+ (ADULT) LOW/MEDIUM RISK SERIES (1 of 2 - PCV13) 04/01/2019         ASSESSMENT AND PLAN:  Screening test reviewed with patient today and updated within health-care maintenance record. Discussion today about general wellness and lifestyle habits.      1. Medicare annual wellness visit, initial  Problem list and medications reviewed and updated.  Preventative health measures discussed    2. Pure hypercholesterolemia  Stable    3. Type 2 diabetes mellitus without complication, without long-term current use of insulin (HCC)  Above goal with A1c of 7.3.  She is now retired, anticipates that she has more time and energy to focus on lifestyle factors.  Ultimately her goal would be to come off of medication.  We will refer for diabetic education as well as consult with dietitian.  Follow-up in 3 months  - REFERRAL TO DIABETIC EDUCATION Diabetes Self Management Education / Training (DSME/T) and Medical Nutrition Therapy (MNT): Initial Group DSME/MNT as authorized by payor, Follow-Up DSME/MNT as authorized by payor; DSME/T Content: Monitoring Diabete...  - REFERRAL TO [facility] IMPROVEMENT PROGRAMS (HIP) Services Requested: Registered Dietitian for Medical Nutrition Therapy; Reason for Visit: Medical Condition Requiring Nutrition Counseling, Management of Chronic Condition, BMI of 25 or higher a...  - metFORMIN ER (GLUCOPHAGE XR) 500 MG TABLET SR 24 HR; Take 1 Tab by mouth 3 times a day, with meals.  Dispense: 270 Tab; Refill: 1    4. Acquired hypothyroidism  Stable    5. Primary insomnia  Stable    6. Encounter for screening breast examination  Normal clinical breast exam

## 2019-04-17 NOTE — ASSESSMENT & PLAN NOTE
Last A1c above goal at 7.3.  She is consistently taking metformin  mg 3 times daily, feels that the medication is making her lethargic.  Ultimately she would like to come off medication if possible.  She is now retired and has more time to focus on her lifestyle factors.  She is interested in taking a diabetes class as well as meeting with the dietitian  No polyuria, polydipsia, headaches.  Up-to-date on eye exam, records requested

## 2019-04-25 ENCOUNTER — PATIENT MESSAGE (OUTPATIENT)
Dept: MEDICAL GROUP | Facility: MEDICAL CENTER | Age: 64
End: 2019-04-25

## 2019-04-25 RX ORDER — BENZONATATE 100 MG/1
100 CAPSULE ORAL 3 TIMES DAILY PRN
Qty: 60 CAP | Refills: 0 | Status: SHIPPED | OUTPATIENT
Start: 2019-04-25 | End: 2019-08-22

## 2019-04-25 NOTE — TELEPHONE ENCOUNTER
From: Che Tovar  To: ONELIA Johnson  Sent: 4/25/2019 10:55 AM PDT  Subject: Prescription Question    I would also like to transfer pharmacies. [facility] uses [facility] as a preferred pharmacy and allows for me to get 90 day prescription refills. could you please note [facility] as my preferred pharmacy and request for 90 refills?   Thank you!!!!  ----- Message -----  From: ONELIA Johnson  Sent: 4/25/2019 10:41 AM PDT  To: Concetta Tovar  Subject: RE: Prescription Question  Sure, I will send it in. Let me know if it helps.  Allison MAYA      ----- Message -----   From: Concetta Tovar   Sent: 4/25/2019 9:22 AM PDT   To: ONELIA Johnson  Subject: Prescription Question    my friend is taking benzonatate (100mg) for a cough and swears by it. can I get a perscription to see if it will help my constant cough.?

## 2019-07-11 ENCOUNTER — PATIENT MESSAGE (OUTPATIENT)
Dept: MEDICAL GROUP | Facility: MEDICAL CENTER | Age: 64
End: 2019-07-11

## 2019-07-16 ENCOUNTER — OFFICE VISIT (OUTPATIENT)
Dept: MEDICAL GROUP | Facility: MEDICAL CENTER | Age: 64
End: 2019-07-16
Payer: MEDICARE

## 2019-07-16 ENCOUNTER — HOSPITAL ENCOUNTER (OUTPATIENT)
Dept: LAB | Facility: MEDICAL CENTER | Age: 64
End: 2019-07-16
Attending: NURSE PRACTITIONER
Payer: MEDICARE

## 2019-07-16 VITALS
HEART RATE: 74 BPM | BODY MASS INDEX: 26.9 KG/M2 | HEIGHT: 60 IN | OXYGEN SATURATION: 96 % | TEMPERATURE: 97.2 F | DIASTOLIC BLOOD PRESSURE: 70 MMHG | RESPIRATION RATE: 16 BRPM | WEIGHT: 137 LBS | SYSTOLIC BLOOD PRESSURE: 106 MMHG

## 2019-07-16 DIAGNOSIS — E03.9 ACQUIRED HYPOTHYROIDISM: ICD-10-CM

## 2019-07-16 DIAGNOSIS — E11.9 TYPE 2 DIABETES MELLITUS WITHOUT COMPLICATION, WITHOUT LONG-TERM CURRENT USE OF INSULIN (HCC): ICD-10-CM

## 2019-07-16 DIAGNOSIS — R05.3 CHRONIC COUGH: ICD-10-CM

## 2019-07-16 DIAGNOSIS — E78.00 PURE HYPERCHOLESTEROLEMIA: ICD-10-CM

## 2019-07-16 DIAGNOSIS — F41.1 GAD (GENERALIZED ANXIETY DISORDER): ICD-10-CM

## 2019-07-16 DIAGNOSIS — I10 ESSENTIAL HYPERTENSION: ICD-10-CM

## 2019-07-16 LAB
ALBUMIN SERPL BCP-MCNC: 3.9 G/DL (ref 3.2–4.9)
ALBUMIN/GLOB SERPL: 1.1 G/DL
ALP SERPL-CCNC: 63 U/L (ref 30–99)
ALT SERPL-CCNC: 19 U/L (ref 2–50)
ANION GAP SERPL CALC-SCNC: 10 MMOL/L (ref 0–11.9)
AST SERPL-CCNC: 18 U/L (ref 12–45)
BILIRUB SERPL-MCNC: 0.8 MG/DL (ref 0.1–1.5)
BUN SERPL-MCNC: 12 MG/DL (ref 8–22)
CALCIUM SERPL-MCNC: 9.7 MG/DL (ref 8.5–10.5)
CHLORIDE SERPL-SCNC: 100 MMOL/L (ref 96–112)
CHOLEST SERPL-MCNC: 193 MG/DL (ref 100–199)
CO2 SERPL-SCNC: 24 MMOL/L (ref 20–33)
CREAT SERPL-MCNC: 0.71 MG/DL (ref 0.5–1.4)
EST. AVERAGE GLUCOSE BLD GHB EST-MCNC: 146 MG/DL
FASTING STATUS PATIENT QL REPORTED: NORMAL
GLOBULIN SER CALC-MCNC: 3.6 G/DL (ref 1.9–3.5)
GLUCOSE SERPL-MCNC: 153 MG/DL (ref 65–99)
HBA1C MFR BLD: 6.7 % (ref 0–5.6)
HDLC SERPL-MCNC: 58 MG/DL
LDLC SERPL CALC-MCNC: 111 MG/DL
POTASSIUM SERPL-SCNC: 4.1 MMOL/L (ref 3.6–5.5)
PROT SERPL-MCNC: 7.5 G/DL (ref 6–8.2)
SODIUM SERPL-SCNC: 134 MMOL/L (ref 135–145)
TRIGL SERPL-MCNC: 119 MG/DL (ref 0–149)

## 2019-07-16 PROCEDURE — 83036 HEMOGLOBIN GLYCOSYLATED A1C: CPT | Mod: GA

## 2019-07-16 PROCEDURE — 99214 OFFICE O/P EST MOD 30 MIN: CPT | Performed by: NURSE PRACTITIONER

## 2019-07-16 PROCEDURE — 36415 COLL VENOUS BLD VENIPUNCTURE: CPT

## 2019-07-16 PROCEDURE — 80061 LIPID PANEL: CPT

## 2019-07-16 PROCEDURE — 80053 COMPREHEN METABOLIC PANEL: CPT

## 2019-07-16 RX ORDER — ESCITALOPRAM OXALATE 10 MG/1
TABLET ORAL
Qty: 30 TAB | Refills: 1 | Status: SHIPPED | OUTPATIENT
Start: 2019-07-16 | End: 2019-08-22 | Stop reason: SDUPTHER

## 2019-07-16 ASSESSMENT — PATIENT HEALTH QUESTIONNAIRE - PHQ9
4. FEELING TIRED OR HAVING LITTLE ENERGY: 2
6. FEELING BAD ABOUT YOURSELF - OR THAT YOU ARE A FAILURE OR HAVE LET YOURSELF OR YOUR FAMILY DOWN: 2
SUM OF ALL RESPONSES TO PHQ QUESTIONS 1-9: 11
8. MOVING OR SPEAKING SO SLOWLY THAT OTHER PEOPLE COULD HAVE NOTICED. OR THE OPPOSITE, BEING SO FIGETY OR RESTLESS THAT YOU HAVE BEEN MOVING AROUND A LOT MORE THAN USUAL: 1
1. LITTLE INTEREST OR PLEASURE IN DOING THINGS: 2
SUM OF ALL RESPONSES TO PHQ9 QUESTIONS 1 AND 2: 3
5. POOR APPETITE OR OVEREATING: 1
2. FEELING DOWN, DEPRESSED, IRRITABLE, OR HOPELESS: 1
7. TROUBLE CONCENTRATING ON THINGS, SUCH AS READING THE NEWSPAPER OR WATCHING TELEVISION: 2
3. TROUBLE FALLING OR STAYING ASLEEP OR SLEEPING TOO MUCH: 0
9. THOUGHTS THAT YOU WOULD BE BETTER OFF DEAD, OR OF HURTING YOURSELF: 0

## 2019-07-16 NOTE — PROGRESS NOTES
Subjective:     Chief Complaint   Patient presents with   • Follow-Up     3 month fv      Che Tovar is a 64 y.o. female here today to follow up on:    Chronic cough  Long standing issue dating back several years, she was seen for this when living in [place] and then again here in [place].  She has had pulmonary function testing which showed no evidence of asthma or restrictive lung disease.  She had allergy testing and reportedly has no allergies.  She was given trial of Prilosec, took this for several months with no notable benefit.  She continues to have a frequent nonproductive cough, sometimes worse at night.  No prior GI evaluation.  No reported heartburn, chronic sore throat, dysphasia, postnasal drainage, congestion, shortness of breath    Type 2 diabetes mellitus without complication (CMS-[facility])  Pt has lost 5 lb since last visit  She is hoping to get off medication or at least off of metformin.  She questions if her metformin may be contributing to her chronic cough although I do not see this listed as a side effect  Exercising 3-4 days weekly, walking  Has had some higher fasting glucose recently, however, up to 180s.   Up-to-date on eye exam, no retinopathy  No history of chronic kidney disease  Denies polyuria, polydipsia, numbness or tingling in extremities    Pure hypercholesterolemia  Pt admits she had not been consistent with medication prior to last labs. She has been better about this in the last month  Component      Latest Ref Rng & Units 3/25/2019           9:27 AM   Cholesterol,Tot      100 - 199 mg/dL 183   Triglycerides      0 - 149 mg/dL 120   HDL      >=40 mg/dL 58   LDL      <100 mg/dL 101 (H)       Essential hypertension  Controlled with irbesartan  No chest pain, dizziness, palpitations    Acquired hypothyroidism  Component      Latest Ref Rng & Units 3/25/2019 3/25/2019           9:27 AM  9:27 AM   TSH      0.380 - 5.330 uIU/mL 3.000    Free T-4      0.53 - 1.43 ng/dL  0.99    Doing well on levothyroxine 50 mcg daily    ELIZABETH (generalized anxiety disorder)  New issue not previously discussed.  Patient states today that she has been feeling very anxious much of the time.  She has lost 2 family members in the last few months and is in conflict with her son, they are not talking at the moment.  ELIZABETH 7 score 11  Patient Health Questionaire    Little interest or pleasure in doing things?: 2   Feeling down, depressed, or hopeless?: 1  Trouble falling or staying asleep, or sleeping too much? : 0  Feeling tired or having little energy? : 2  Poor appetite or overeating? : 1  Feeling bad about yourself - or that you are a failure or have let yourself or your family down? : 2  Trouble concentrating on things, such as reading the newspaper or watching television? : 2  Moving or speaking so slowly that other people could have noticed.  Or the opposite - being so fidgety or restless that you have been moving around alot more than usual. : 1  Thoughts that you would be better off dead, or of hurting yourself?: 0  Patient Health Questionnaire Score: 11    If depressive symptoms identified deferred to follow up visit unless specifically addressed in assesment and plan.    Interpretation of PHQ-9 Total Score   Score Severity   1-4 No Depression   5-9 Mild Depression   10-14 Moderate Depression   15-19 Moderately Severe Depression   20-27 Severe Depression    She is interested in treatment options including medication and counseling  No SI/HI, history of manic behavior, substance abuse       Current medicines (including changes today)  Current Outpatient Prescriptions   Medication Sig Dispense Refill   • escitalopram (LEXAPRO) 10 MG Tab 1/2 tab PO daily x 1 week then 1 tab PO daily 30 Tab 1   • benzonatate (TESSALON) 100 MG Cap Take 1 Cap by mouth 3 times a day as needed for Cough. 60 Cap 0   • metFORMIN ER (GLUCOPHAGE XR) 500 MG TABLET SR 24 HR Take 1 Tab by mouth 3 times a day, with meals. 270 Tab 1   •  "irbesartan (AVAPRO) 75 MG tablet TAKE ONE TABLET BY MOUTH DAILY 30 Tab 5   • ONE TOUCH ULTRA TEST strip USE AS DIRECTED TO TEST ONCE DAILY 100 Strip 2   • traZODone (DESYREL) 50 MG Tab Take 1 Tab by mouth every bedtime. 30 Tab 11   • Lancets Lancets order: Lancets for One Touch Ultra meter. Sig: use once daily 100 Each 3   • levothyroxine (SYNTHROID) 50 MCG Tab Take 1 Tab by mouth Every morning on an empty stomach. 90 Tab 3   • polymixin-trimethoprim (POLYTRIM) 33760-4.1 UNIT/ML-% Solution Place 1 Drop in both eyes every 4 hours. 10 mL 0   • pravastatin (PRAVACHOL) 40 MG tablet Take 1 Tab by mouth every day. 90 Tab 1   • polymixin-trimethoprim (POLYTRIM) 16322-7.1 UNIT/ML-% Solution Place 1 Drop in both eyes 4 times a day. 10 mL 0   • tobramycin (TOBREX) 0.3 % Solution ophthalmic solution Place 1 Drop in both eyes every 4 hours. While awake for 5-7 days until symptoms. 1 Bottle 0   • azelastine (ASTELIN) 137 MCG/SPRAY nasal spray [place] 1 Spray in nose 2 times a day. 30 mL 1   • Blood Glucose Monitoring Suppl Device Meter: Dispense Device of Insurance Preference. Sig. Use as directed for blood sugar monitoring. #1. NR. 1 Device 0   • cyclobenzaprine (FLEXERIL) 10 MG Tab Take one-half tablet orally as needed for spasms at night       No current facility-administered medications for this visit.      She  has a past medical history of Allergy; Anxiety; Diabetes (HCC); and Thyroid disease.    ROS included above     Objective:     /70 (BP Location: Left arm, Patient Position: Sitting, BP Cuff Size: Adult)   Pulse 74   Temp 36.2 °C (97.2 °F) (Temporal)   Resp 16   Ht 1.53 m (5' 0.24\")   Wt 62.1 kg (137 lb)   SpO2 96%  Body mass index is 26.55 kg/m².     Physical Exam:  General: Alert, oriented in no acute distress.  Eye contact is good, speech is normal, affect calm  Lungs: clear to auscultation bilaterally, normal effort, no wheeze/ rhonchi/ rales.  CV: regular rate and rhythm, S1, S2, no murmur  Ext: no " edema, color normal, vascularity normal, temperature normal    Assessment and Plan:   The following treatment plan was discussed   1. Chronic cough   ongoing now for quite some time, she states that this is embarrassing and problematic for her.  She has had allergy testing which was negative, PFT normal.  Was given trial of PPI but did not find this to be beneficial.  Will refer to gastroenterology to further rule out reflux as contributing factor  REFERRAL TO GASTROENTEROLOGY   2. Type 2 diabetes mellitus without complication, without long-term current use of insulin (HCC)   she would like to come off of medication if possible.  Last A1c 7.3.  She has lost weight since that time but also notes that her fasting glucose has been higher.  Evaluate labs, I will follow-up with her by phone  Comp Metabolic Panel    HEMOGLOBIN A1C   3. Pure hypercholesterolemia   stable  Lipid Profile   4. Essential hypertension   stable  Comp Metabolic Panel   5. Acquired hypothyroidism   stable   6. ELIZABETH (generalized anxiety disorder)   new issue.  Benefits of SSRIs reviewed, will start trial of Lexapro.  Risks and side effects discussed, discussed need for sustained therapy to obtain results.  Follow-up in 1 month  REFERRAL TO BEHAVIORAL HEALTH    escitalopram (LEXAPRO) 10 MG Tab       Followup: 1 month and pending labs         Please note that this dictation was created using voice recognition software. I have worked with consultants from the vendor as well as technical experts from [employer] to optimize the interface. I have made every reasonable attempt to correct obvious errors, but I expect that there are errors of grammar and possibly content that I did not discover before finalizing the note.

## 2019-07-16 NOTE — ASSESSMENT & PLAN NOTE
Pt has lost 5 lb since last visit  She is hoping to get off medication or at least off of metformin.  She questions if her metformin may be contributing to her chronic cough although I do not see this listed as a side effect  Exercising 3-4 days weekly, walking  Has had some higher fasting glucose recently, however, up to 180s.   Up-to-date on eye exam, no retinopathy  No history of chronic kidney disease  Denies polyuria, polydipsia, numbness or tingling in extremities

## 2019-07-16 NOTE — ASSESSMENT & PLAN NOTE
Component      Latest Ref Rng & Units 3/25/2019 3/25/2019           9:27 AM  9:27 AM   TSH      0.380 - 5.330 uIU/mL 3.000    Free T-4      0.53 - 1.43 ng/dL  0.99   Doing well on levothyroxine 50 mcg daily

## 2019-07-16 NOTE — ASSESSMENT & PLAN NOTE
New issue not previously discussed.  Patient states today that she has been feeling very anxious much of the time.  She has lost 2 family members in the last few months and is in conflict with her son, they are not talking at the moment.  ELIZABETH 7 score 11  Patient Health Questionaire    Little interest or pleasure in doing things?: 2   Feeling down, depressed, or hopeless?: 1  Trouble falling or staying asleep, or sleeping too much? : 0  Feeling tired or having little energy? : 2  Poor appetite or overeating? : 1  Feeling bad about yourself - or that you are a failure or have let yourself or your family down? : 2  Trouble concentrating on things, such as reading the newspaper or watching television? : 2  Moving or speaking so slowly that other people could have noticed.  Or the opposite - being so fidgety or restless that you have been moving around alot more than usual. : 1  Thoughts that you would be better off dead, or of hurting yourself?: 0  Patient Health Questionnaire Score: 11    If depressive symptoms identified deferred to follow up visit unless specifically addressed in assesment and plan.    Interpretation of PHQ-9 Total Score   Score Severity   1-4 No Depression   5-9 Mild Depression   10-14 Moderate Depression   15-19 Moderately Severe Depression   20-27 Severe Depression    She is interested in treatment options including medication and counseling  No SI/HI, history of manic behavior, substance abuse

## 2019-07-16 NOTE — ASSESSMENT & PLAN NOTE
Long standing issue dating back several years, she was seen for this when living in [place] and then again here in [place].  She has had pulmonary function testing which showed no evidence of asthma or restrictive lung disease.  She had allergy testing and reportedly has no allergies.  She was given trial of Prilosec, took this for several months with no notable benefit.  She continues to have a frequent nonproductive cough, sometimes worse at night.  No prior GI evaluation.  No reported heartburn, chronic sore throat, dysphasia, postnasal drainage, congestion, shortness of breath

## 2019-07-16 NOTE — ASSESSMENT & PLAN NOTE
Pt admits she had not been consistent with medication prior to last labs. She has been better about this in the last month  Component      Latest Ref Rng & Units 3/25/2019           9:27 AM   Cholesterol,Tot      100 - 199 mg/dL 183   Triglycerides      0 - 149 mg/dL 120   HDL      >=40 mg/dL 58   LDL      <100 mg/dL 101 (H)

## 2019-07-17 ENCOUNTER — PATIENT MESSAGE (OUTPATIENT)
Dept: MEDICAL GROUP | Facility: MEDICAL CENTER | Age: 64
End: 2019-07-17

## 2019-07-17 NOTE — TELEPHONE ENCOUNTER
From: Che Tovar  To: ONELIA Johnson  Sent: 7/17/2019 11:27 AM PDT  Subject: Prescription Question    Should I still be taking Trazdone with my new medication Escitalopram?

## 2019-07-18 ENCOUNTER — PATIENT MESSAGE (OUTPATIENT)
Dept: MEDICAL GROUP | Facility: MEDICAL CENTER | Age: 64
End: 2019-07-18

## 2019-07-18 DIAGNOSIS — E11.9 TYPE 2 DIABETES MELLITUS WITHOUT COMPLICATION, WITHOUT LONG-TERM CURRENT USE OF INSULIN (HCC): ICD-10-CM

## 2019-07-18 NOTE — TELEPHONE ENCOUNTER
From: Che Tovar  To: ONELIA Johnson  Sent: 7/17/2019 8:32 PM PDT  Subject: Prescription Question    will Victoza bring my sugar to low? Since I'm with in range right now? I don't know. It's so confusing  ----- Message -----  From: ONELIA Johnson  Sent: 7/17/2019 4:21 PM PDT  To: Concetta Tovar  Subject: RE: Prescription Question  Yes, it is okay to take them both.    ----- Message -----   From: Concetta Tovar   Sent: 7/17/2019 11:27 AM PDT   To: ONELIA Johnson  Subject: Prescription Question    Should I still be taking Trazdone with my new medication Escitalopram?

## 2019-07-18 NOTE — TELEPHONE ENCOUNTER
From: Che Tovar  To: ONELIA Johnson  Sent: 7/18/2019 3:39 PM PDT  Subject: Prescription Question    O.k. I'm going to cut the metformin to once a day.   Could you see if my insurance covers the once a week meds please??  I'll keep you posted. I leave for [place] in a week and will be back August 19.  ----- Message -----  From: ONELIA Johnson  Sent: 7/18/2019 11:18 AM PDT  To: Concetta Tovar  Subject: RE: Prescription Question  Adair Hylton,  No, I would not expect Victoza to lower your sugar too much. It is actually used in patients who do not have diabetes to help promote weight loss, so it will not stimulate insulin production. It works by delaying gastric emptying.  If you would like, we could think about this for a while and first have you reduce your metformin dose with recheck of labs in 3 months.  Allison MAYA            .    ----- Message -----   From: Concetta Tovar   Sent: 7/17/2019 8:32 PM PDT   To: ONELIA Johnson  Subject: Prescription Question    will Victoza bring my sugar to low? Since I'm with in range right now? I don't know. It's so confusing  ----- Message -----  From: ONELIA Johnson  Sent: 7/17/2019 4:21 PM PDT  To: Concetta Tovar  Subject: RE: Prescription Question  Yes, it is okay to take them both.    ----- Message -----   From: Concetta Tovar   Sent: 7/17/2019 11:27 AM PDT   To: ONELIA Johnson  Subject: Prescription Question    Should I still be taking Trazdone with my new medication Escitalopram?

## 2019-07-19 ENCOUNTER — PATIENT MESSAGE (OUTPATIENT)
Dept: MEDICAL GROUP | Facility: MEDICAL CENTER | Age: 64
End: 2019-07-19

## 2019-07-19 NOTE — TELEPHONE ENCOUNTER
From: Che Tovar  To: ONELIA Johnson  Sent: 7/19/2019 12:31 PM PDT  Subject: Prescription Question    it is covered and it is ready for me to pick it up.   ----- Message -----  From: ONELIA Johnson  Sent: 7/18/2019 3:53 PM PDT  To: Concetta Tovar  Subject: RE: Prescription Question  I will have to send a prescription to your pharmacy and have them run it through your insurance. I will do that, let me know what they say.    ----- Message -----   From: Concetta Tovar   Sent: 7/18/2019 3:39 PM PDT   To: ONELIA Johnson  Subject: Prescription Question    O.k. I'm going to cut the metformin to once a day.   Could you see if my insurance covers the once a week meds please??  I'll keep you posted. I leave for [place] in a week and will be back August 19.  ----- Message -----  From: ONELIA Johnson  Sent: 7/18/2019 11:18 AM PDT  To: Concetta Tovar  Subject: RE: Prescription Question  Adair Hylton,  Radha, I would not expect Victoza to lower your sugar too much. It is actually used in patients who do not have diabetes to help promote weight loss, so it will not stimulate insulin production. It works by delaying gastric emptying.  If you would like, we could think about this for a while and first have you reduce your metformin dose with recheck of labs in 3 months.  Allison MAYA            .    ----- Message -----   From: Concetta Tovar   Sent: 7/17/2019 8:32 PM PDT   To: ONELIA Johnson  Subject: Prescription Question    will Victoza bring my sugar to low? Since I'm with in range right now? I don't know. It's so confusing  ----- Message -----  From: ONELIA Johnson  Sent: 7/17/2019 4:21 PM PDT  To: Concetta Tovar  Subject: RE: Prescription Question  Yes, it is okay to take them both.    ----- Message -----   From: Concetta Tovar   Sent: 7/17/2019 11:27 AM PDT   To: ONELIA Johnson  Subject: Prescription Question    Should I still be  taking Trazdone with my new medication Escitalopram?

## 2019-07-23 ENCOUNTER — PATIENT MESSAGE (OUTPATIENT)
Dept: MEDICAL GROUP | Facility: MEDICAL CENTER | Age: 64
End: 2019-07-23

## 2019-07-23 NOTE — TELEPHONE ENCOUNTER
From: Che Tovar  To: ONELIA Johnson  Sent: 7/23/2019 12:41 PM PDT  Subject: Non-Urgent Medical Question    Could I please get another glucose monitor? I left mine in a hotel in [place] this weekend. Also, should I still take the pravastain and the blood presure meds?

## 2019-08-22 ENCOUNTER — OFFICE VISIT (OUTPATIENT)
Dept: MEDICAL GROUP | Facility: MEDICAL CENTER | Age: 64
End: 2019-08-22
Payer: MEDICARE

## 2019-08-22 VITALS
HEART RATE: 64 BPM | TEMPERATURE: 97.8 F | BODY MASS INDEX: 26.7 KG/M2 | HEIGHT: 60 IN | SYSTOLIC BLOOD PRESSURE: 114 MMHG | DIASTOLIC BLOOD PRESSURE: 80 MMHG | WEIGHT: 136 LBS | RESPIRATION RATE: 16 BRPM | OXYGEN SATURATION: 97 %

## 2019-08-22 DIAGNOSIS — F41.1 GAD (GENERALIZED ANXIETY DISORDER): ICD-10-CM

## 2019-08-22 DIAGNOSIS — E78.00 PURE HYPERCHOLESTEROLEMIA: ICD-10-CM

## 2019-08-22 DIAGNOSIS — Z23 NEED FOR VACCINATION: ICD-10-CM

## 2019-08-22 DIAGNOSIS — F51.01 PRIMARY INSOMNIA: ICD-10-CM

## 2019-08-22 DIAGNOSIS — E03.9 ACQUIRED HYPOTHYROIDISM: ICD-10-CM

## 2019-08-22 DIAGNOSIS — E11.9 TYPE 2 DIABETES MELLITUS WITHOUT COMPLICATION, WITHOUT LONG-TERM CURRENT USE OF INSULIN (HCC): ICD-10-CM

## 2019-08-22 DIAGNOSIS — Z11.59 NEED FOR HEPATITIS C SCREENING TEST: ICD-10-CM

## 2019-08-22 PROCEDURE — 99214 OFFICE O/P EST MOD 30 MIN: CPT | Mod: 25 | Performed by: NURSE PRACTITIONER

## 2019-08-22 PROCEDURE — 90670 PCV13 VACCINE IM: CPT | Performed by: NURSE PRACTITIONER

## 2019-08-22 PROCEDURE — G0009 ADMIN PNEUMOCOCCAL VACCINE: HCPCS | Performed by: NURSE PRACTITIONER

## 2019-08-22 PROCEDURE — 90746 HEPB VACCINE 3 DOSE ADULT IM: CPT | Performed by: NURSE PRACTITIONER

## 2019-08-22 PROCEDURE — G0010 ADMIN HEPATITIS B VACCINE: HCPCS | Performed by: NURSE PRACTITIONER

## 2019-08-22 RX ORDER — IRBESARTAN 75 MG/1
37.5 TABLET ORAL DAILY
Qty: 45 TAB | Refills: 3 | Status: SHIPPED | OUTPATIENT
Start: 2019-08-22 | End: 2020-09-07

## 2019-08-22 RX ORDER — ESCITALOPRAM OXALATE 10 MG/1
10 TABLET ORAL DAILY
Qty: 90 TAB | Refills: 3 | Status: SHIPPED | OUTPATIENT
Start: 2019-08-22 | End: 2021-02-04 | Stop reason: SDUPTHER

## 2019-08-22 RX ORDER — TRAZODONE HYDROCHLORIDE 50 MG/1
50 TABLET ORAL
Qty: 90 TAB | Refills: 3 | Status: SHIPPED | OUTPATIENT
Start: 2019-08-22 | End: 2020-09-07

## 2019-08-22 NOTE — LETTER
[employer] [facility]  JEWELL Johnson.  [address]  [facility] [address]  Fax: [phone]   Authorization for Release/Disclosure of   Protected Health Information   Name: ALEXI TOVAR : 1955 SSN: [id number]   Address: [address] [place] [address] Phone:    [phone] (home)    I authorize the entity listed below to release/disclose the PHI below to:   [facility]/ONELIA Johnson and ONELIA Johnson   Provider or Entity Name:  [facility]   Address   City, State, [facility]   Phone:      Fax:     Reason for request: continuity of care   Information to be released:    [  ] LAST COLONOSCOPY,  including any PATH REPORT and follow-up  [  ] LAST FIT/COLOGUARD RESULT [  ] LAST DEXA  [  ] LAST MAMMOGRAM  [  ] LAST PAP  [  ] LAST LABS [  ] RETINA EXAM REPORT  [  ] IMMUNIZATION RECORDS  [ x ] Release all info      [  ] Check here and initial the line next to each item to release ALL health information INCLUDING  _____ Care and treatment for drug and / or alcohol abuse  _____ HIV testing, infection status, or AIDS  _____ Genetic Testing    DATES OF SERVICE OR TIME PERIOD TO BE DISCLOSED: _____________  I understand and acknowledge that:  * This Authorization may be revoked at any time by you in writing, except if your health information has already been used or disclosed.  * Your health information that will be used or disclosed as a result of you signing this authorization could be re-disclosed by the recipient. If this occurs, your re-disclosed health information may no longer be protected by State or Federal laws.  * You may refuse to sign this Authorization. Your refusal will not affect your ability to obtain treatment.  * This Authorization becomes effective upon signing and will  on (date) __________.      If no date is indicated, this Authorization will  one (1) year from the signature date.    Name: Alexi Tovar    Signature:   Date:          8/22/2019       PLEASE FAX REQUESTED RECORDS BACK TO: [phone]

## 2019-08-22 NOTE — ASSESSMENT & PLAN NOTE
Stable on levothyroxine 50 mcg daily.  She reports great energy level, has no concerns at this time

## 2019-08-22 NOTE — ASSESSMENT & PLAN NOTE
Here to follow-up on new prescription for Lexapro which she feels is working wonderfully.  ELIZABETH 7 score is down to 0 today.  No concerning side effects, she is very happy with medication

## 2019-08-22 NOTE — PROGRESS NOTES
Subjective:     Chief Complaint   Patient presents with   • Follow-Up     Che Tovar is a 64 y.o. female here today to follow up on:    Pure hypercholesterolemia  Stable with pravastatin, tolerating medication without difficulty.  Labs due in January    Type 2 diabetes mellitus without complication (CMS-[facility])  A1c has been very good at 6.7, she has reduced her dose of metformin to 1 tablet daily and is feeling great with this.  Interestingly she feels that this is led to resolution of her chronic cough.  She is monitoring her glucose at home with all readings less than 140, continuing to work on diet and exercise    Primary insomnia  Trazodone has been working well for her, she would like a 90-day refill from pharmacy    Acquired hypothyroidism  Stable on levothyroxine 50 mcg daily.  She reports great energy level, has no concerns at this time    ELIZABETH (generalized anxiety disorder)  Here to follow-up on new prescription for Lexapro which she feels is working wonderfully.  ELIZABETH 7 score is down to 0 today.  No concerning side effects, she is very happy with medication       Current medicines (including changes today)  Current Outpatient Medications   Medication Sig Dispense Refill   • traZODone (DESYREL) 50 MG Tab Take 1 Tab by mouth every bedtime. 90 Tab 3   • irbesartan (AVAPRO) 75 MG tablet Take 0.5 Tabs by mouth every day. TAKE ONE TABLET BY MOUTH DAILY 45 Tab 3   • escitalopram (LEXAPRO) 10 MG Tab Take 1 Tab by mouth every day. 1/2 tab PO daily x 1 week then 1 tab PO daily 90 Tab 3   • Blood Glucose Monitoring Suppl Device Meter: Dispense Device of Insurance Preference. Sig. Use as directed for blood sugar monitoring. #1. NR. 1 Device 0   • Dulaglutide 0.75 MG/0.5ML Solution Pen-injector Inject 0.75 mg as instructed every 7 days. 2 PEN 1   • metFORMIN ER (GLUCOPHAGE XR) 500 MG TABLET SR 24 HR Take 1 Tab by mouth 3 times a day, with meals. 270 Tab 1   • ONE TOUCH ULTRA TEST strip USE AS DIRECTED TO TEST  "ONCE DAILY 100 Strip 2   • Lancets Lancets order: Lancets for One Touch Ultra meter. Sig: use once daily 100 Each 3   • levothyroxine (SYNTHROID) 50 MCG Tab Take 1 Tab by mouth Every morning on an empty stomach. 90 Tab 3   • pravastatin (PRAVACHOL) 40 MG tablet Take 1 Tab by mouth every day. 90 Tab 1   • polymixin-trimethoprim (POLYTRIM) 77051-6.1 UNIT/ML-% Solution Place 1 Drop in both eyes 4 times a day. 10 mL 0   • polymixin-trimethoprim (POLYTRIM) 88310-4.1 UNIT/ML-% Solution Place 1 Drop in both eyes every 4 hours. (Patient not taking: Reported on 8/22/2019) 10 mL 0   • tobramycin (TOBREX) 0.3 % Solution ophthalmic solution Place 1 Drop in both eyes every 4 hours. While awake for 5-7 days until symptoms. (Patient not taking: Reported on 8/22/2019) 1 Bottle 0   • azelastine (ASTELIN) 137 MCG/SPRAY nasal spray [place] 1 Spray in nose 2 times a day. (Patient not taking: Reported on 8/22/2019) 30 mL 1     No current facility-administered medications for this visit.      She  has a past medical history of Allergy, Anxiety, Diabetes (HCC), and Thyroid disease.    ROS included above     Objective:     /80 (BP Location: Left arm, Patient Position: Sitting, BP Cuff Size: Adult)   Pulse 64   Temp 36.6 °C (97.8 °F) (Temporal)   Resp 16   Ht 1.53 m (5' 0.24\")   Wt 61.7 kg (136 lb)   SpO2 97%  Body mass index is 26.35 kg/m².     Physical Exam:  General: Alert, oriented in no acute distress.  Eye contact is good, speech is normal, affect calm  Lungs: clear to auscultation bilaterally, normal effort, no wheeze/ rhonchi/ rales.  CV: regular rate and rhythm, S1, S2, no murmur  Ext: no edema, color normal, vascularity normal, temperature normal    Assessment and Plan:   The following treatment plan was discussed   1. Type 2 diabetes mellitus without complication, without long-term current use of insulin ([facility])   stable and doing well, labs in January  irbesartan (AVAPRO) 75 MG tablet    HEMOGLOBIN A1C    Comp Metabolic " Panel    Lipid Profile   2. ELIZABETH (generalized anxiety disorder)   significant improvement with Lexapro, she is happy with current dose  escitalopram (LEXAPRO) 10 MG Tab   3. Acquired hypothyroidism   clinically euthyroid.  Labs in January  TSH+FREE T4   4. Pure hypercholesterolemia   stable   5. Primary insomnia   stable   6. Need for vaccination  I have placed the below orders and discussed them with an approved delegating provider. The MA is performing the below orders under the direction of Dr. Galicia  Hepatitis B Vaccine Adult 20+    Prevnar-13 Vaccine (PCV13)   7. Need for hepatitis C screening test  Hep C Virus Antibody       Followup: labs in jan.         Please note that this dictation was created using voice recognition software. I have worked with consultants from the vendor as well as technical experts from [employer] [employer] to optimize the interface. I have made every reasonable attempt to correct obvious errors, but I expect that there are errors of grammar and possibly content that I did not discover before finalizing the note.

## 2019-08-22 NOTE — ASSESSMENT & PLAN NOTE
A1c has been very good at 6.7, she has reduced her dose of metformin to 1 tablet daily and is feeling great with this.  Interestingly she feels that this is led to resolution of her chronic cough.  She is monitoring her glucose at home with all readings less than 140, continuing to work on diet and exercise

## 2019-11-11 ENCOUNTER — PATIENT MESSAGE (OUTPATIENT)
Dept: MEDICAL GROUP | Facility: MEDICAL CENTER | Age: 64
End: 2019-11-11

## 2019-12-03 DIAGNOSIS — E11.9 TYPE 2 DIABETES MELLITUS WITHOUT COMPLICATION, WITHOUT LONG-TERM CURRENT USE OF INSULIN (HCC): ICD-10-CM

## 2019-12-03 RX ORDER — PRAVASTATIN SODIUM 40 MG
40 TABLET ORAL DAILY
Qty: 90 TAB | Refills: 1 | Status: CANCELLED | OUTPATIENT
Start: 2019-12-03

## 2019-12-04 ENCOUNTER — PATIENT MESSAGE (OUTPATIENT)
Dept: MEDICAL GROUP | Facility: MEDICAL CENTER | Age: 64
End: 2019-12-04

## 2019-12-04 RX ORDER — PRAVASTATIN SODIUM 40 MG
40 TABLET ORAL DAILY
Qty: 90 TAB | Refills: 1 | Status: SHIPPED | OUTPATIENT
Start: 2019-12-04 | End: 2020-08-21

## 2019-12-27 ENCOUNTER — PATIENT MESSAGE (OUTPATIENT)
Dept: MEDICAL GROUP | Facility: MEDICAL CENTER | Age: 64
End: 2019-12-27

## 2019-12-27 RX ORDER — POLYMYXIN B SULFATE AND TRIMETHOPRIM 1; 10000 MG/ML; [USP'U]/ML
1 SOLUTION OPHTHALMIC EVERY 4 HOURS
Qty: 10 ML | Refills: 0 | Status: SHIPPED | OUTPATIENT
Start: 2019-12-27 | End: 2019-12-31

## 2019-12-27 NOTE — TELEPHONE ENCOUNTER
From: Che Tovar  To: ONELIA Johnson  Sent: 12/27/2019 9:04 AM PST  Subject: Non-Urgent Medical Question    Good morning. I woke up this morning with Pink eye in my right eye. I've been battling a cold for a couple of days now and I've been taking Mucinex and the Benzonatate you prescribed for me a while back..  Could I get eye drop prescription sent to [facility]?

## 2019-12-30 ENCOUNTER — PATIENT MESSAGE (OUTPATIENT)
Dept: MEDICAL GROUP | Facility: MEDICAL CENTER | Age: 64
End: 2019-12-30

## 2019-12-30 NOTE — TELEPHONE ENCOUNTER
From: Che Tovar  To: ONELIA Johnson  Sent: 12/30/2019 2:59 PM PST  Subject: Non-Urgent Medical Question    I got the eye drop prescription and I've been using them for 3 days now. My eyes are SUPER red and Itchy. My eye lids are swollen. is this normal or am I allergic to it?  ----- Message -----  From: ONELIA Johnson  Sent: 12/27/2019 9:36 AM PST  To: Che Tovar  Subject: RE: Non-Urgent Medical Question  Good morning,  I will send in a prescription for the eyedrops. If you are not better by next week please let us know.  ONELIA Johnson      ----- Message -----   From: Che Tovar   Sent: 12/27/2019 9:04 AM PST   To: ONELIA Johnson  Subject: Non-Urgent Medical Question    Good morning. I woke up this morning with Pink eye in my right eye.  I've been battling a cold for a couple of days now and I've been taking Mucinex and the Benzonatate you prescribed for me a while back..  Could I get [facility] drop prescription [facility]?

## 2019-12-31 ENCOUNTER — OFFICE VISIT (OUTPATIENT)
Dept: MEDICAL GROUP | Facility: MEDICAL CENTER | Age: 64
End: 2019-12-31
Payer: MEDICARE

## 2019-12-31 ENCOUNTER — PATIENT MESSAGE (OUTPATIENT)
Dept: MEDICAL GROUP | Facility: MEDICAL CENTER | Age: 64
End: 2019-12-31

## 2019-12-31 VITALS
SYSTOLIC BLOOD PRESSURE: 138 MMHG | OXYGEN SATURATION: 96 % | DIASTOLIC BLOOD PRESSURE: 100 MMHG | RESPIRATION RATE: 16 BRPM | WEIGHT: 142 LBS | HEART RATE: 90 BPM | TEMPERATURE: 98.1 F | HEIGHT: 60 IN | BODY MASS INDEX: 27.88 KG/M2

## 2019-12-31 DIAGNOSIS — J06.9 VIRAL URI: ICD-10-CM

## 2019-12-31 DIAGNOSIS — T78.40XA ALLERGIC REACTION TO DRUG, INITIAL ENCOUNTER: ICD-10-CM

## 2019-12-31 PROCEDURE — 99213 OFFICE O/P EST LOW 20 MIN: CPT | Performed by: NURSE PRACTITIONER

## 2019-12-31 NOTE — TELEPHONE ENCOUNTER
From: Che Burkettmia Tovar  To: ONELIA Johnson  Sent: 12/30/2019 4:13 PM PST  Subject: Non-Urgent Medical Question    Yes  ----- Message -----  From: ONELIA Johnson  Sent: 12/30/2019 4:11 PM PST  To: Che Tovar  Subject: RE: Non-Urgent Medical Question  Can you make it at 10:40?    ----- Message -----   From: Che Gargtitus Tovar   Sent: 12/30/2019 4:02 PM PST   To: ONELIA Johnson  Subject: Non-Urgent Medical Question    I can come in tomorrow  ----- Message -----  From: ONELIA Johnson  Sent: 12/30/2019 3:34 PM PST  To: Che Tovar  Subject: RE: Non-Urgent Medical Question  It sounds as though you may be allergic to it, I would recommend that you stop. We can have you come in tomorrow for an appointment if you would like.    ----- Message -----   From: Che Tovar   Sent: 12/30/2019 2:59 PM PST   To: ONELIA Johnson  Subject: Non-Urgent Medical Question    I got the eye drop prescription and I've been using them for 3 days now. My eyes are SUPER red and Itchy. My eye lids are swollen. is this normal or am I allergic to it?  ----- Message -----  From: ONELIA Johnson  Sent: 12/27/2019 9:36 AM PST  To: Che Burkettmia Tovar  Subject: RE: Non-Urgent Medical Question  Good morning,  I will send in a prescription for the eyedrops. If you are not better by next week please let us know.  ONELIA Johnson      ----- Message -----   From: Che Bernardmia Tovar   Sent: 12/27/2019 9:04 AM PST   To: ONELIA Johnson  Subject: Non-Urgent Medical Question    Good morning. I woke up this morning with Pink eye in my right eye. I've been battling a cold for a couple of days now and I've been taking Mucinex and the Benzonatate you prescribed for me a while back..  Could I get eye drop prescription [facility]?

## 2019-12-31 NOTE — TELEPHONE ENCOUNTER
From: Che Tovar  To: ONELIA Johnson  Sent: 12/30/2019 4:02 PM PST  Subject: Non-Urgent Medical Question    I can come in tomorrow  ----- Message -----  From: ONELIA Johnson  Sent: 12/30/2019 3:34 PM PST  To: Che Tovar  Subject: RE: Non-Urgent Medical Question  It sounds as though you may be allergic to it, I would recommend that you stop. We can have you come in tomorrow for an appointment if you would like.    ----- Message -----   From: Che Bernardmai Tovar   Sent: 12/30/2019 2:59 PM PST   To: ONELIA Johnson  Subject: Non-Urgent Medical Question    I got the eye drop prescription and I've been using them for 3 days now. My eyes are SUPER red and Itchy. My eye lids are swollen. is this normal or am I allergic to it?  ----- Message -----  From: ONELIA Johnson  Sent: 12/27/2019 9:36 AM PST  To: Che Burkettmia Tovar  Subject: RE: Non-Urgent Medical Question  Good morning,  I will send in a prescription for the eyedrops. If you are not better by next week please let us know.  ONELIA Johnson      ----- Message -----   From: Che Tovar   Sent: 12/27/2019 9:04 AM PST   To: ONELIA Johnson  Subject: Non-Urgent Medical Question    Good morning. I woke up this morning with Pink eye in my right eye. I've been battling a cold for a couple of days now and I've been taking Mucinex and the Benzonatate you prescribed for me a while back..  Could I get eye drop prescription [facility]?

## 2019-12-31 NOTE — PROGRESS NOTES
Subjective:     Chief Complaint   Patient presents with   • Sinus Problem   • Eye Problem     EYE ITCHING BOTH EYES      Che Tovar is a 64 y.o. female established patient here for evaluation of congestion, cough, eye irritation.  She states that she developed low-grade fever, sore throat, cough starting 1 week ago.  In subsequent days she developed eye irritation and discharge, she contacted me with concerns about conjunctivitis.  Her granddaughter have been sick with similar symptoms.  I did send in a prescription for Polytrim ophthalmic for her to start, she reports that she started using these drops and her eyes became much worse.  They were very itchy, irritated, her eyelids were puffy.  She discontinued this yesterday after having contacted our office.  Her eyes are doing better this morning, she reports that the itching is improved.  She continues to cough but has not had any shortness of breath, fever, chest pain, nausea.  She is taking Mucinex D.    No problem-specific Assessment & Plan notes found for this encounter.       Current medicines (including changes today)  Current Outpatient Medications   Medication Sig Dispense Refill   • glucose blood (ONE TOUCH ULTRA TEST) strip 1 Strip by Other route 3 times a day as needed. 100 Strip 2   • pravastatin (PRAVACHOL) 40 MG tablet Take 1 Tab by mouth every day. 90 Tab 1   • traZODone (DESYREL) 50 MG Tab Take 1 Tab by mouth every bedtime. 90 Tab 3   • irbesartan (AVAPRO) 75 MG tablet Take 0.5 Tabs by mouth every day. TAKE ONE TABLET BY MOUTH DAILY 45 Tab 3   • escitalopram (LEXAPRO) 10 MG Tab Take 1 Tab by mouth every day. 1/2 tab PO daily x 1 week then 1 tab PO daily 90 Tab 3   • Blood Glucose Monitoring Suppl Device Meter: Dispense Device of Insurance Preference. Sig. Use as directed for blood sugar monitoring. #1. NR. 1 Device 0   • metFORMIN ER (GLUCOPHAGE XR) 500 MG TABLET SR 24 HR Take 1 Tab by mouth 3 times a day, with meals. 270 Tab 1   •  "Lancets Lancets order: Lancets for One Touch Ultra meter. Sig: use once daily 100 Each 3   • levothyroxine (SYNTHROID) 50 MCG Tab Take 1 Tab by mouth Every morning on an empty stomach. 90 Tab 3   • escitalopram (LEXAPRO) 10 MG Tab TAKE 1/2 TABLET BY MOUTH DAILY FOR 1 WEEK THEN TAKE 1 TABLET BY MOUTH DAILY 30 Tab 11   • Dulaglutide 0.75 MG/0.5ML Solution Pen-injector Inject 0.75 mg as instructed every 7 days. 2 PEN 1   • azelastine (ASTELIN) 137 MCG/SPRAY nasal spray [place] 1 Spray in nose 2 times a day. 30 mL 1     No current facility-administered medications for this visit.      She  has a past medical history of Allergy, Anxiety, Diabetes (HCC), and Thyroid disease.    ROS included above     Objective:     /100 (BP Location: Left arm, Patient Position: Sitting, BP Cuff Size: Adult)   Pulse 90   Temp 36.7 °C (98.1 °F) (Temporal)   Resp 16   Ht 1.53 m (5' 0.24\")   Wt 64.4 kg (142 lb)   SpO2 96%  Body mass index is 27.52 kg/m².     Physical Exam:  General: Alert, oriented in no acute distress.  Eye contact is good, speech is normal, affect calm  HEENT: Oral mucosa pink moist, no lesions.  Sclera and conjunctiva clear.  No eye discharge, no swelling.  TMs gray with good landmarks bilaterally. No lymphadenopathy.  Lungs: clear to auscultation bilaterally, normal effort, no wheeze/ rhonchi/ rales.  CV: regular rate and rhythm, S1, S2, no murmur  Ext: no edema, color normal, vascularity normal, temperature normal    Assessment and Plan:   The following treatment plan was discussed   1. Viral URI   lungs clear on exam, well oxygenated on room air, expected to gradually improve.  Continue supportive treatment, suggested regular Mucinex rather than Mucinex D as her blood pressure is slightly elevated in clinic today.   2. Allergic reaction to drug, initial encounter   sounds as though she had an allergic reaction to Polytrim ophthalmic.  At this point she has no further eye discharge and we will hold off on any " additional treatment, she may contact me for any changes       Followup: as needed       Please note that this dictation was created using voice recognition software. I have worked with consultants from the vendor as well as technical experts from [facility] [employer] to optimize the interface. I have made every reasonable attempt to correct obvious errors, but I expect that there are errors of grammar and possibly content that I did not discover before finalizing the note.

## 2020-01-01 RX ORDER — BENZONATATE 100 MG/1
100 CAPSULE ORAL 3 TIMES DAILY PRN
Qty: 60 CAP | Refills: 0 | Status: SHIPPED | OUTPATIENT
Start: 2020-01-01 | End: 2020-03-10

## 2020-01-01 NOTE — TELEPHONE ENCOUNTER
From: Che Tovar  To: ONELIA Johnson  Sent: 12/31/2019 3:24 PM PST  Subject: Prescription Question    You had prescribed BENZONATATE 100mg for me back in April 25,2019. Can I get that refilled? I'm almost out of them.

## 2020-01-01 NOTE — PATIENT COMMUNICATION
Was the patient seen in the last year in this department? Yes    Does patient have an active prescription for medications requested? No     Received Request Via: Patient     Requested Prescriptions     Pending Prescriptions Disp Refills   • benzonatate (TESSALON) 100 MG Cap 60 Cap 0     Sig: Take 1 Cap by mouth 3 times a day as needed for Cough.

## 2020-02-18 DIAGNOSIS — E11.9 TYPE 2 DIABETES MELLITUS WITHOUT COMPLICATION, WITHOUT LONG-TERM CURRENT USE OF INSULIN (HCC): ICD-10-CM

## 2020-02-18 RX ORDER — METFORMIN HYDROCHLORIDE 500 MG/1
TABLET, EXTENDED RELEASE ORAL
Qty: 270 TAB | Refills: 1 | Status: SHIPPED | OUTPATIENT
Start: 2020-02-18 | End: 2020-11-05 | Stop reason: SDUPTHER

## 2020-02-22 ENCOUNTER — HOSPITAL ENCOUNTER (OUTPATIENT)
Dept: LAB | Facility: MEDICAL CENTER | Age: 65
End: 2020-02-22
Attending: NURSE PRACTITIONER
Payer: MEDICARE

## 2020-02-22 DIAGNOSIS — Z11.59 NEED FOR HEPATITIS C SCREENING TEST: ICD-10-CM

## 2020-02-22 DIAGNOSIS — E11.9 TYPE 2 DIABETES MELLITUS WITHOUT COMPLICATION, WITHOUT LONG-TERM CURRENT USE OF INSULIN (HCC): ICD-10-CM

## 2020-02-22 LAB
ALBUMIN SERPL BCP-MCNC: 4 G/DL (ref 3.2–4.9)
ALBUMIN/GLOB SERPL: 1.1 G/DL
ALP SERPL-CCNC: 69 U/L (ref 30–99)
ALT SERPL-CCNC: 29 U/L (ref 2–50)
ANION GAP SERPL CALC-SCNC: 9 MMOL/L (ref 0–11.9)
AST SERPL-CCNC: 24 U/L (ref 12–45)
BILIRUB SERPL-MCNC: 0.7 MG/DL (ref 0.1–1.5)
BUN SERPL-MCNC: 11 MG/DL (ref 8–22)
CALCIUM SERPL-MCNC: 9.9 MG/DL (ref 8.5–10.5)
CHLORIDE SERPL-SCNC: 101 MMOL/L (ref 96–112)
CHOLEST SERPL-MCNC: 215 MG/DL (ref 100–199)
CO2 SERPL-SCNC: 27 MMOL/L (ref 20–33)
CREAT SERPL-MCNC: 0.81 MG/DL (ref 0.5–1.4)
EST. AVERAGE GLUCOSE BLD GHB EST-MCNC: 197 MG/DL
FASTING STATUS PATIENT QL REPORTED: NORMAL
GLOBULIN SER CALC-MCNC: 3.8 G/DL (ref 1.9–3.5)
GLUCOSE SERPL-MCNC: 266 MG/DL (ref 65–99)
HBA1C MFR BLD: 8.5 % (ref 0–5.6)
HDLC SERPL-MCNC: 68 MG/DL
LDLC SERPL CALC-MCNC: 112 MG/DL
POTASSIUM SERPL-SCNC: 4.6 MMOL/L (ref 3.6–5.5)
PROT SERPL-MCNC: 7.8 G/DL (ref 6–8.2)
SODIUM SERPL-SCNC: 137 MMOL/L (ref 135–145)
T4 FREE SERPL-MCNC: 0.84 NG/DL (ref 0.53–1.43)
TRIGL SERPL-MCNC: 175 MG/DL (ref 0–149)
TSH SERPL DL<=0.005 MIU/L-ACNC: 4.15 UIU/ML (ref 0.38–5.33)

## 2020-02-22 PROCEDURE — 36415 COLL VENOUS BLD VENIPUNCTURE: CPT

## 2020-02-22 PROCEDURE — 80053 COMPREHEN METABOLIC PANEL: CPT

## 2020-02-22 PROCEDURE — 84443 ASSAY THYROID STIM HORMONE: CPT

## 2020-02-22 PROCEDURE — 86803 HEPATITIS C AB TEST: CPT

## 2020-02-22 PROCEDURE — 83036 HEMOGLOBIN GLYCOSYLATED A1C: CPT | Mod: GA

## 2020-02-22 PROCEDURE — 80061 LIPID PANEL: CPT

## 2020-02-22 PROCEDURE — 84439 ASSAY OF FREE THYROXINE: CPT

## 2020-02-23 LAB — HCV AB SER QL: NEGATIVE

## 2020-03-10 ENCOUNTER — OFFICE VISIT (OUTPATIENT)
Dept: MEDICAL GROUP | Facility: MEDICAL CENTER | Age: 65
End: 2020-03-10
Payer: MEDICARE

## 2020-03-10 VITALS
HEIGHT: 60 IN | WEIGHT: 141.09 LBS | SYSTOLIC BLOOD PRESSURE: 104 MMHG | TEMPERATURE: 97.2 F | RESPIRATION RATE: 16 BRPM | DIASTOLIC BLOOD PRESSURE: 70 MMHG | OXYGEN SATURATION: 98 % | HEART RATE: 86 BPM | BODY MASS INDEX: 27.7 KG/M2

## 2020-03-10 DIAGNOSIS — F41.1 GAD (GENERALIZED ANXIETY DISORDER): ICD-10-CM

## 2020-03-10 DIAGNOSIS — E03.9 ACQUIRED HYPOTHYROIDISM: ICD-10-CM

## 2020-03-10 DIAGNOSIS — E11.9 TYPE 2 DIABETES MELLITUS WITHOUT COMPLICATION, WITHOUT LONG-TERM CURRENT USE OF INSULIN (HCC): ICD-10-CM

## 2020-03-10 DIAGNOSIS — E78.00 PURE HYPERCHOLESTEROLEMIA: ICD-10-CM

## 2020-03-10 PROBLEM — J06.9 VIRAL URI: Status: RESOLVED | Noted: 2019-12-31 | Resolved: 2020-03-10

## 2020-03-10 PROCEDURE — 99214 OFFICE O/P EST MOD 30 MIN: CPT | Performed by: NURSE PRACTITIONER

## 2020-03-10 RX ORDER — LEVOTHYROXINE SODIUM 0.07 MG/1
75 TABLET ORAL
Qty: 90 TAB | Refills: 1 | Status: SHIPPED | OUTPATIENT
Start: 2020-03-10 | End: 2020-09-07

## 2020-03-10 ASSESSMENT — PATIENT HEALTH QUESTIONNAIRE - PHQ9: CLINICAL INTERPRETATION OF PHQ2 SCORE: 0

## 2020-03-10 NOTE — ASSESSMENT & PLAN NOTE
Component      Latest Ref Rng & Units 7/16/2019 2/22/2020           9:08 AM  8:37 AM   Glycohemoglobin      0.0 - 5.6 % 6.7 (H) 8.5 (H)   Estim. Avg Glu      mg/dL 146 197   A1c is substantially increased in the last few months, she feels that this is related to lack of activity.  She does have a prescription for Trulicity but has not yet started it, she is currently taking metformin.  No polyuria, polydipsia, numbness or tingling in extremities

## 2020-03-11 NOTE — ASSESSMENT & PLAN NOTE
Recent labs reviewed, patient is complaining of chronic fatigue despite getting adequate rest as well as her looks.  No constipation, heat or cold intolerance.  Currently taking levothyroxine 50 mcg daily  Component      Latest Ref Rng & Units 2/22/2020 2/22/2020           8:37 AM  8:37 AM   TSH      0.380 - 5.330 uIU/mL 4.150    Free T-4      0.53 - 1.43 ng/dL  0.84

## 2020-03-11 NOTE — PROGRESS NOTES
Subjective:     Chief Complaint   Patient presents with   • Follow-Up     Che Tovar is a 64 y.o. female here today to follow up on:    Type 2 diabetes mellitus without complication (CMS-HCC)  Component      Latest Ref Rng & Units 7/16/2019 2/22/2020           9:08 AM  8:37 AM   Glycohemoglobin      0.0 - 5.6 % 6.7 (H) 8.5 (H)   Estim. Avg Glu      mg/dL 146 197   A1c is substantially increased in the last few months, she feels that this is related to lack of activity.  She does have a prescription for Trulicity but has not yet started it, she is currently taking metformin.  No polyuria, polydipsia, numbness or tingling in extremities    Pure hypercholesterolemia  Stable on pravastatin, tolerating medication without difficulty including myalgia    Acquired hypothyroidism  Recent labs reviewed, patient is complaining of chronic fatigue despite getting adequate rest as well as her looks.  No constipation, heat or cold intolerance.  Currently taking levothyroxine 50 mcg daily  Component      Latest Ref Rng & Units 2/22/2020 2/22/2020           8:37 AM  8:37 AM   TSH      0.380 - 5.330 uIU/mL 4.150    Free T-4      0.53 - 1.43 ng/dL  0.84       ELIZABETH (generalized anxiety disorder)  Doing well on Lexapro       Current medicines (including changes today)  Current Outpatient Medications   Medication Sig Dispense Refill   • levothyroxine (SYNTHROID) 75 MCG Tab Take 1 Tab by mouth Every morning on an empty stomach. 90 Tab 1   • metFORMIN ER (GLUCOPHAGE XR) 500 MG TABLET SR 24 HR TAKE 1 TABLET BY MOUTH THREE TIMES DAILY WITH MEALS 270 Tab 1   • glucose blood (ONE TOUCH ULTRA TEST) strip 1 Strip by Other route 3 times a day as needed. 100 Strip 2   • pravastatin (PRAVACHOL) 40 MG tablet Take 1 Tab by mouth every day. 90 Tab 1   • escitalopram (LEXAPRO) 10 MG Tab TAKE 1/2 TABLET BY MOUTH DAILY FOR 1 WEEK THEN TAKE 1 TABLET BY MOUTH DAILY 30 Tab 11   • traZODone (DESYREL) 50 MG Tab Take 1 Tab by mouth every bedtime. 90  "Tab 3   • irbesartan (AVAPRO) 75 MG tablet Take 0.5 Tabs by mouth every day. TAKE ONE TABLET BY MOUTH DAILY 45 Tab 3   • escitalopram (LEXAPRO) 10 MG Tab Take 1 Tab by mouth every day. 1/2 tab PO daily x 1 week then 1 tab PO daily 90 Tab 3   • Blood Glucose Monitoring Suppl Device Meter: Dispense Device of Insurance Preference. Sig. Use as directed for blood sugar monitoring. #1. NR. 1 Device 0   • Dulaglutide 0.75 MG/0.5ML Solution Pen-injector Inject 0.75 mg as instructed every 7 days. 2 PEN 1   • Lancets Lancets order: Lancets for One Touch Ultra meter. Sig: use once daily 100 Each 3   • levothyroxine (SYNTHROID) 50 MCG Tab Take 1 Tab by mouth Every morning on an empty stomach. 90 Tab 3   • azelastine (ASTELIN) 137 MCG/SPRAY nasal spray [place] 1 Spray in nose 2 times a day. 30 mL 1     No current facility-administered medications for this visit.      She  has a past medical history of Allergy, Anxiety, Diabetes (HCC), and Thyroid disease.    ROS included above     Objective:     /70 (BP Location: Left arm, Patient Position: Sitting, BP Cuff Size: Adult)   Pulse 86   Temp 36.2 °C (97.2 °F) (Temporal)   Resp 16   Ht 1.53 m (5' 0.24\")   Wt 64 kg (141 lb 1.5 oz)   SpO2 98%  Body mass index is 27.34 kg/m².     Physical Exam:  General: Alert, oriented in no acute distress.  Eye contact is good, speech is normal, affect calm  Lungs: clear to auscultation bilaterally, normal effort, no wheeze/ rhonchi/ rales.  CV: regular rate and rhythm, S1, S2  Abdomen: soft, nontender  Ext: no edema, color normal, vascularity normal, temperature normal    Assessment and Plan:   The following treatment plan was discussed   1. Type 2 diabetes mellitus without complication, without long-term current use of insulin (HCC)   uncontrolled.  She will go ahead and start Trulicity, risks and side effects reviewed.  Continue with metformin 500 mg daily, she will contact me with an update in the next few weeks at which point we can " adjust Trulicity dose.  Follow-up appointment and labs in 3 months  HEMOGLOBIN A1C    Comp Metabolic Panel   2. Acquired hypothyroidism   complaining of poor energy level, interested in medication adjustment.  There is room to go up slightly on her dose with target TSH of 1.0-3.0.  We will try:  levothyroxine (SYNTHROID) 75 MCG Tab    TSH+FREE T4   3. Pure hypercholesterolemia   stable   4. ELIZABETH (generalized anxiety disorder)   stable       Followup: 3 months with labs prior         Please note that this dictation was created using voice recognition software. I have worked with consultants from the vendor as well as technical experts from [facility] to optimize the interface. I have made every reasonable attempt to correct obvious errors, but I expect that there are errors of grammar and possibly content that I did not discover before finalizing the note.

## 2020-04-11 DIAGNOSIS — E11.9 TYPE 2 DIABETES MELLITUS WITHOUT COMPLICATION, WITHOUT LONG-TERM CURRENT USE OF INSULIN (HCC): ICD-10-CM

## 2020-04-13 RX ORDER — DULAGLUTIDE 0.75 MG/.5ML
INJECTION, SOLUTION SUBCUTANEOUS
Qty: 2 ML | Refills: 3 | Status: SHIPPED | OUTPATIENT
Start: 2020-04-13 | End: 2021-06-07

## 2020-05-20 ENCOUNTER — OFFICE VISIT (OUTPATIENT)
Dept: MEDICAL GROUP | Facility: MEDICAL CENTER | Age: 65
End: 2020-05-20
Payer: MEDICARE

## 2020-05-20 VITALS
TEMPERATURE: 98.3 F | HEIGHT: 60 IN | OXYGEN SATURATION: 99 % | SYSTOLIC BLOOD PRESSURE: 130 MMHG | HEART RATE: 81 BPM | DIASTOLIC BLOOD PRESSURE: 72 MMHG | RESPIRATION RATE: 16 BRPM | BODY MASS INDEX: 26.84 KG/M2 | WEIGHT: 136.69 LBS

## 2020-05-20 DIAGNOSIS — M25.551 RIGHT HIP PAIN: ICD-10-CM

## 2020-05-20 DIAGNOSIS — M53.3 CHRONIC RIGHT SI JOINT PAIN: ICD-10-CM

## 2020-05-20 DIAGNOSIS — G89.29 CHRONIC RIGHT SI JOINT PAIN: ICD-10-CM

## 2020-05-20 PROCEDURE — 99214 OFFICE O/P EST MOD 30 MIN: CPT | Performed by: NURSE PRACTITIONER

## 2020-05-20 RX ORDER — CYCLOBENZAPRINE HCL 10 MG
10 TABLET ORAL 3 TIMES DAILY PRN
Qty: 30 TAB | Refills: 0 | Status: ON HOLD | OUTPATIENT
Start: 2020-05-20 | End: 2021-06-23

## 2020-05-20 NOTE — ASSESSMENT & PLAN NOTE
This is been a chronic issue dating back to at least May 2018.  Patient reports that since that time her hip pain has steadily worsened, now bothers her to sit down or lie on her side at night.  She has gone through physical therapy, the therapist told her that ultimately there was nothing else they could do to help her.  Her pain is limiting her activity.  She did have an x-ray in 2018 which showed mild osteoarthritis.  She has been unable to tolerate NSAIDs due to history of gastric surgery and GI bleed.  She occasionally takes Tylenol but this is not providing any relief.  She does have some element of low back pain and pain in the SI joint which may be a contributing factor.  She feels that pain radiates into the buttock and that her strength in the leg is decreased at times. She has used Flexeril on sparing occasions and has found this to be helpful particularly at night when trying to sleep.  No numbness or tingling in lower extremity, no change in range of motion, no popping of the hip joint.  No saddle anesthesia, no history of trauma

## 2020-05-20 NOTE — PROGRESS NOTES
Subjective:     Chief Complaint   Patient presents with   • Hip Pain     R hip pain, worse past 2 months, possibly arthristis      Che Tovar is a 65 y.o. female here today to follow up on:    Right hip pain  This is been a chronic issue dating back to at least May 2018.  Patient reports that since that time her hip pain has steadily worsened, now bothers her to sit down or lie on her side at night.  She has gone through physical therapy, the therapist told her that ultimately there was nothing else they could do to help her.  Her pain is limiting her activity.  She did have an x-ray in 2018 which showed mild osteoarthritis.  She has been unable to tolerate NSAIDs due to history of gastric surgery and GI bleed.  She occasionally takes Tylenol but this is not providing any relief.  She does have some element of low back pain and pain in the SI joint which may be a contributing factor.  She feels that pain radiates into the buttock and that her strength in the leg is decreased at times. She has used Flexeril on sparing occasions and has found this to be helpful particularly at night when trying to sleep.  No numbness or tingling in lower extremity, no change in range of motion, no popping of the hip joint.  No saddle anesthesia, no history of trauma       Current medicines (including changes today)  Current Outpatient Medications   Medication Sig Dispense Refill   • cyclobenzaprine (FLEXERIL) 10 MG Tab Take 1 Tab by mouth 3 times a day as needed. 30 Tab 0   • TRULICITY 0.75 MG/0.5ML Solution Pen-injector INJECT CONTENTS OF 1 SYRINGE AS DIRECTED EVERY 7 DAYS 2 mL 3   • levothyroxine (SYNTHROID) 50 MCG Tab TAKE 1 TABLET BY MOUTH EVERY MORNING ON AN EMPTY STOMACH 90 Tab 1   • levothyroxine (SYNTHROID) 75 MCG Tab Take 1 Tab by mouth Every morning on an empty stomach. 90 Tab 1   • metFORMIN ER (GLUCOPHAGE XR) 500 MG TABLET SR 24 HR TAKE 1 TABLET BY MOUTH THREE TIMES DAILY WITH MEALS 270 Tab 1   • glucose blood  (ONE TOUCH ULTRA TEST) strip 1 Strip by Other route 3 times a day as needed. 100 Strip 2   • pravastatin (PRAVACHOL) 40 MG tablet Take 1 Tab by mouth every day. 90 Tab 1   • escitalopram (LEXAPRO) 10 MG Tab TAKE 1/2 TABLET BY MOUTH DAILY FOR 1 WEEK THEN TAKE 1 TABLET BY MOUTH DAILY 30 Tab 11   • traZODone (DESYREL) 50 MG Tab Take 1 Tab by mouth every bedtime. 90 Tab 3   • irbesartan (AVAPRO) 75 MG tablet Take 0.5 Tabs by mouth every day. TAKE ONE TABLET BY MOUTH DAILY 45 Tab 3   • escitalopram (LEXAPRO) 10 MG Tab Take 1 Tab by mouth every day. 1/2 tab PO daily x 1 week then 1 tab PO daily 90 Tab 3   • Blood Glucose Monitoring Suppl Device Meter: Dispense Device of Insurance Preference. Sig. Use as directed for blood sugar monitoring. #1. NR. 1 Device 0   • Lancets Lancets order: Lancets for One Touch Ultra meter. Sig: use once daily 100 Each 3   • azelastine (ASTELIN) 137 MCG/SPRAY nasal spray [place] 1 Spray in nose 2 times a day. 30 mL 1     No current facility-administered medications for this visit.      She  has a past medical history of Allergy, Anxiety, Diabetes (HCC), and Thyroid disease.    ROS included above     Objective:     /72   Pulse 81   Temp 36.8 °C (98.3 °F) (Temporal)   Resp 16   Ht 1.524 m (5')   Wt 62 kg (136 lb 11 oz)   SpO2 99%  Body mass index is 26.69 kg/m².     Physical Exam:  General: Alert, oriented in no acute distress.  Eye contact is good, speech is normal, affect calm  Lungs: clear to auscultation bilaterally, normal effort, no wheeze/ rhonchi/ rales.  CV: regular rate and rhythm, S1, S2  MS: No tenderness over lumbar spinous process, slight tenderness over right SI joint.  Slight tenderness over right trochanteric bursa.  Hip with normal range of motion, discomfort with abduction  Ext: no edema, color normal, vascularity normal, temperature normal    Assessment and Plan:   The following treatment plan was discussed   1. Right hip pain   this is been an ongoing problem since  2018, prior x-ray showing mild osteoarthritis.  She did diligently attend physical therapy but overall is not getting any relief in her pain.  She is unable to tolerate NSAIDs due to history of gastric bypass, she is not getting any relief from Tylenol.  We will refer her to orthopedics for further evaluation and treatment.  I will renew Flexeril for her which has helped to get her comfortable for sleep.  Risk and benefits reviewed.  Advised not to drive or drink alcohol with medication.  cyclobenzaprine (FLEXERIL) 10 MG Tab    REFERRAL TO ORTHOPEDICS   2. Chronic right SI joint pain  cyclobenzaprine (FLEXERIL) 10 MG Tab    REFERRAL TO ORTHOPEDICS       Followup: as needed         Please note that this dictation was created using voice recognition software. I have worked with consultants from the vendor as well as technical experts from [facility] [employer] to optimize the interface. I have made every reasonable attempt to correct obvious errors, but I expect that there are errors of grammar and possibly content that I did not discover before finalizing the note.

## 2020-06-10 ENCOUNTER — HOSPITAL ENCOUNTER (OUTPATIENT)
Dept: LAB | Facility: MEDICAL CENTER | Age: 65
End: 2020-06-10
Attending: NURSE PRACTITIONER
Payer: MEDICARE

## 2020-06-10 DIAGNOSIS — E11.9 TYPE 2 DIABETES MELLITUS WITHOUT COMPLICATION, WITHOUT LONG-TERM CURRENT USE OF INSULIN (HCC): ICD-10-CM

## 2020-06-10 LAB
ALBUMIN SERPL BCP-MCNC: 4 G/DL (ref 3.2–4.9)
ALBUMIN/GLOB SERPL: 1.4 G/DL
ALP SERPL-CCNC: 76 U/L (ref 30–99)
ALT SERPL-CCNC: 32 U/L (ref 2–50)
ANION GAP SERPL CALC-SCNC: 13 MMOL/L (ref 7–16)
AST SERPL-CCNC: 24 U/L (ref 12–45)
BILIRUB SERPL-MCNC: 0.6 MG/DL (ref 0.1–1.5)
BUN SERPL-MCNC: 11 MG/DL (ref 8–22)
CALCIUM SERPL-MCNC: 9.7 MG/DL (ref 8.4–10.2)
CHLORIDE SERPL-SCNC: 102 MMOL/L (ref 96–112)
CO2 SERPL-SCNC: 22 MMOL/L (ref 20–33)
CREAT SERPL-MCNC: 0.64 MG/DL (ref 0.5–1.4)
EST. AVERAGE GLUCOSE BLD GHB EST-MCNC: 186 MG/DL
FASTING STATUS PATIENT QL REPORTED: NORMAL
GLOBULIN SER CALC-MCNC: 2.9 G/DL (ref 1.9–3.5)
GLUCOSE SERPL-MCNC: 200 MG/DL (ref 65–99)
HBA1C MFR BLD: 8.1 % (ref 0–5.6)
POTASSIUM SERPL-SCNC: 4 MMOL/L (ref 3.6–5.5)
PROT SERPL-MCNC: 6.9 G/DL (ref 6–8.2)
SODIUM SERPL-SCNC: 137 MMOL/L (ref 135–145)

## 2020-06-10 PROCEDURE — 36415 COLL VENOUS BLD VENIPUNCTURE: CPT

## 2020-06-10 PROCEDURE — 80053 COMPREHEN METABOLIC PANEL: CPT

## 2020-06-10 PROCEDURE — 83036 HEMOGLOBIN GLYCOSYLATED A1C: CPT | Mod: GA

## 2020-06-15 ENCOUNTER — TELEPHONE (OUTPATIENT)
Dept: PHYSICAL THERAPY | Facility: MEDICAL CENTER | Age: 65
End: 2020-06-15

## 2020-06-15 NOTE — OP THERAPY DISCHARGE SUMMARY
Outpatient Physical Therapy  DISCHARGE SUMMARY NOTE      [facility] Outpatient Physical Therapy  [address]  [place] [address]  Phone:  [phone]  Fax:  [phone]    Date of Visit: 06/15/2020    Patient: Concetta Tovar  YOB: 1955  MRN: [id number]     Referring Provider: No referring provider defined for this encounter.   Referring Diagnosis No admission diagnoses are documented for this encounter.         Functional Assessment Used        Your patient is being discharged from Physical Therapy with the following comments:   · Patient cancelled or missed more than 2 scheduled appointments/non-compliant  · Patient has failed to schedule or reschedule follow-up visits    Comments:   Patient last seen 10/24/18. Per protocol chart will be closed.  Unknown. Follow up with primary care as needed    Limitations Remaining:      Recommendations:      Mc Zarate PT, DPT    Date: 6/15/2020

## 2020-06-18 ENCOUNTER — OFFICE VISIT (OUTPATIENT)
Dept: MEDICAL GROUP | Facility: MEDICAL CENTER | Age: 65
End: 2020-06-18
Payer: MEDICARE

## 2020-06-18 VITALS
HEART RATE: 90 BPM | SYSTOLIC BLOOD PRESSURE: 140 MMHG | BODY MASS INDEX: 25.1 KG/M2 | WEIGHT: 127.87 LBS | OXYGEN SATURATION: 97 % | DIASTOLIC BLOOD PRESSURE: 76 MMHG | TEMPERATURE: 98.7 F | RESPIRATION RATE: 16 BRPM | HEIGHT: 60 IN

## 2020-06-18 DIAGNOSIS — E11.9 TYPE 2 DIABETES MELLITUS WITHOUT COMPLICATION, WITHOUT LONG-TERM CURRENT USE OF INSULIN (HCC): ICD-10-CM

## 2020-06-18 DIAGNOSIS — J30.0 VASOMOTOR RHINITIS: ICD-10-CM

## 2020-06-18 PROCEDURE — 99214 OFFICE O/P EST MOD 30 MIN: CPT | Performed by: NURSE PRACTITIONER

## 2020-06-18 RX ORDER — AZELASTINE 1 MG/ML
1 SPRAY, METERED NASAL 2 TIMES DAILY
Qty: 30 ML | Refills: 1 | Status: SHIPPED | OUTPATIENT
Start: 2020-06-18 | End: 2021-06-07

## 2020-06-18 NOTE — PROGRESS NOTES
Subjective:     Chief Complaint   Patient presents with   • Diabetes Follow-up     Che Tovar is a 65 y.o. female here today to follow up on:    Type 2 diabetes mellitus without complication (CMS-[facility])  At last visit patient was to start Trulicity 0.75 mg weekly with intent to increase if tolerated.  Unfortunately we have not talked since that time and she had maintained at the low dose.  She is also taking metformin although only 1 tablet daily.  Her A1c has come down from 8.5 only slightly to 8.1.  She has, however, managed to lose 9 pounds.  She is having difficulty getting regular exercise due to COVID-19 pandemic but continues to work out of town about 2 weeks a month, she is very active during those times.  She is tolerating the Trulicity well and denies nausea, abdominal pain.  She does note that her appetite is decreased since having started.  No polyuria, polydipsia, numbness or tingling in extremities, blurred vision.  She has occasional dizziness.  She is monitoring her glucose about once daily and notes that readings tend to range from 150-200 fasting.  Kidney function is normal in recent labs       Current medicines (including changes today)  Current Outpatient Medications   Medication Sig Dispense Refill   • Dulaglutide 1.5 MG/0.5ML Solution Pen-injector Inject 1.5 mg as instructed every 7 days. 4 PEN 3   • azelastine (ASTELIN) 137 MCG/SPRAY nasal spray [place] 1 Spray in nose 2 times a day. 30 mL 1   • cyclobenzaprine (FLEXERIL) 10 MG Tab Take 1 Tab by mouth 3 times a day as needed. 30 Tab 0   • TRULICITY 0.75 MG/0.5ML Solution Pen-injector INJECT CONTENTS OF 1 SYRINGE AS DIRECTED EVERY 7 DAYS 2 mL 3   • levothyroxine (SYNTHROID) 50 MCG Tab TAKE 1 TABLET BY MOUTH EVERY MORNING ON AN EMPTY STOMACH 90 Tab 1   • levothyroxine (SYNTHROID) 75 MCG Tab Take 1 Tab by mouth Every morning on an empty stomach. 90 Tab 1   • metFORMIN ER (GLUCOPHAGE XR) 500 MG TABLET SR 24 HR TAKE 1 TABLET BY MOUTH THREE  TIMES DAILY WITH MEALS 270 Tab 1   • glucose blood (ONE TOUCH ULTRA TEST) strip 1 Strip by Other route 3 times a day as needed. 100 Strip 2   • pravastatin (PRAVACHOL) 40 MG tablet Take 1 Tab by mouth every day. 90 Tab 1   • traZODone (DESYREL) 50 MG Tab Take 1 Tab by mouth every bedtime. 90 Tab 3   • irbesartan (AVAPRO) 75 MG tablet Take 0.5 Tabs by mouth every day. TAKE ONE TABLET BY MOUTH DAILY 45 Tab 3   • escitalopram (LEXAPRO) 10 MG Tab Take 1 Tab by mouth every day. 1/2 tab PO daily x 1 week then 1 tab PO daily 90 Tab 3   • Blood Glucose Monitoring Suppl Device Meter: Dispense Device of Insurance Preference. Sig. Use as directed for blood sugar monitoring. #1. NR. 1 Device 0   • Lancets Lancets order: Lancets for One Touch Ultra meter. Sig: use once daily 100 Each 3     No current facility-administered medications for this visit.      She  has a past medical history of Allergy, Anxiety, Diabetes (HCC), and Thyroid disease.    ROS included above     Objective:     /76 (BP Location: Right arm, Patient Position: Sitting, BP Cuff Size: Adult)   Pulse 90   Temp 37.1 °C (98.7 °F) (Temporal)   Resp 16   Ht 1.524 m (5')   Wt 58 kg (127 lb 13.9 oz)   SpO2 97%  Body mass index is 24.97 kg/m².     Physical Exam:  General: Alert, oriented in no acute distress.  Eye contact is good, speech is normal, affect calm  Lungs: clear to auscultation bilaterally, normal effort, no wheeze/ rhonchi/ rales.  CV: regular rate and rhythm, S1, S2, no murmur  Abdomen: soft, nontender  Ext: no edema, color normal, vascularity normal, temperature normal    Assessment and Plan:   The following treatment plan was discussed   1. Type 2 diabetes mellitus without complication, without long-term current use of insulin (HCC)   tolerating low dose of Trulicity without difficulty, we will go ahead and increase which I would expect should have a greater effect on her A1c and overall glycemic control.  Advised to continue with metformin  500 mg 3 tabs daily for now, we will adjust as her glucose comes down.  Follow-up in the office in 3 months  Dulaglutide 1.5 MG/0.5ML Solution Pen-injector   2. Vasomotor rhinitis  azelastine (ASTELIN) 137 MCG/SPRAY nasal spray       Followup: 3 months       Please note that this dictation was created using voice recognition software. I have worked with consultants from the vendor as well as technical experts from [facility] to optimize the interface. I have made every reasonable attempt to correct obvious errors, but I expect that there are errors of grammar and possibly content that I did not discover before finalizing the note.

## 2020-06-18 NOTE — ASSESSMENT & PLAN NOTE
At last visit patient was to start Trulicity 0.75 mg weekly with intent to increase if tolerated.  Unfortunately we have not talked since that time and she had maintained at the low dose.  She is also taking metformin although only 1 tablet daily.  Her A1c has come down from 8.5 only slightly to 8.1.  She has, however, managed to lose 9 pounds.  She is having difficulty getting regular exercise due to COVID-19 pandemic but continues to work out of town about 2 weeks a month, she is very active during those times.  She is tolerating the Trulicity well and denies nausea, abdominal pain.  She does note that her appetite is decreased since having started.  No polyuria, polydipsia, numbness or tingling in extremities, blurred vision.  She has occasional dizziness.  She is monitoring her glucose about once daily and notes that readings tend to range from 150-200 fasting.  Kidney function is normal in recent labs

## 2020-07-24 ENCOUNTER — PATIENT MESSAGE (OUTPATIENT)
Dept: MEDICAL GROUP | Facility: MEDICAL CENTER | Age: 65
End: 2020-07-24

## 2020-07-26 ENCOUNTER — PATIENT MESSAGE (OUTPATIENT)
Dept: MEDICAL GROUP | Facility: MEDICAL CENTER | Age: 65
End: 2020-07-26

## 2020-07-26 DIAGNOSIS — F41.9 ACUTE ANXIETY: ICD-10-CM

## 2020-07-28 ENCOUNTER — PATIENT MESSAGE (OUTPATIENT)
Dept: MEDICAL GROUP | Facility: MEDICAL CENTER | Age: 65
End: 2020-07-28

## 2020-07-28 RX ORDER — DIAZEPAM 5 MG/1
TABLET ORAL
Qty: 1 TAB | Refills: 0 | Status: SHIPPED | OUTPATIENT
Start: 2020-07-28 | End: 2020-07-30

## 2020-07-28 NOTE — TELEPHONE ENCOUNTER
From: Che Tovar  To: ONELIA Johnson  Sent: 7/28/2020 10:00 AM PDT  Subject: Prescription Question    Thank yo Doctor. I have a friend that is taking me. should I take this on my way to the appt? or before that?      ----- Message -----   From:ONELIA Johnson   Sent:7/28/2020 7:12 AM PDT   To:Che Tovar   Subject:RE: Prescription Question    Ok, I will send a one time dose of medication to your pharmacy. Make sure someone is driving you.      ----- Message -----   From:Che Tovar   Sent:7/26/2020 10:34 PM PDT   To:ONELIA Johnson   Subject:Prescription Question    I will need the prescription for the meds to under go the MRI. the MRI is schedualed for this Wednesday at 2p.m. in [facility]. Dr. Hays.

## 2020-08-21 RX ORDER — PRAVASTATIN SODIUM 40 MG
TABLET ORAL
Qty: 90 TAB | Refills: 1 | Status: SHIPPED | OUTPATIENT
Start: 2020-08-21 | End: 2021-12-15

## 2020-08-30 ENCOUNTER — PATIENT MESSAGE (OUTPATIENT)
Dept: MEDICAL GROUP | Facility: MEDICAL CENTER | Age: 65
End: 2020-08-30

## 2020-08-31 RX ORDER — IPRATROPIUM BROMIDE 42 UG/1
2 SPRAY, METERED NASAL 4 TIMES DAILY
Qty: 15 ML | Refills: 5 | Status: SHIPPED | OUTPATIENT
Start: 2020-08-31 | End: 2021-06-07

## 2020-09-05 DIAGNOSIS — E03.9 ACQUIRED HYPOTHYROIDISM: ICD-10-CM

## 2020-09-05 DIAGNOSIS — E11.9 TYPE 2 DIABETES MELLITUS WITHOUT COMPLICATION, WITHOUT LONG-TERM CURRENT USE OF INSULIN (HCC): ICD-10-CM

## 2020-09-07 RX ORDER — LEVOTHYROXINE SODIUM 0.07 MG/1
TABLET ORAL
Qty: 90 TAB | Refills: 1 | Status: SHIPPED | OUTPATIENT
Start: 2020-09-07 | End: 2021-01-04

## 2020-09-07 RX ORDER — DULAGLUTIDE 1.5 MG/.5ML
INJECTION, SOLUTION SUBCUTANEOUS
Qty: 2 ML | Refills: 3 | Status: SHIPPED | OUTPATIENT
Start: 2020-09-07 | End: 2021-01-14 | Stop reason: SDUPTHER

## 2020-09-07 RX ORDER — IRBESARTAN 75 MG/1
TABLET ORAL
Qty: 45 TAB | Refills: 3 | Status: SHIPPED | OUTPATIENT
Start: 2020-09-07 | End: 2021-06-07

## 2020-09-07 RX ORDER — TRAZODONE HYDROCHLORIDE 50 MG/1
TABLET ORAL
Qty: 90 TAB | Refills: 3 | Status: SHIPPED | OUTPATIENT
Start: 2020-09-07 | End: 2021-12-15

## 2020-09-13 NOTE — PROGRESS NOTES
"Che Tovar is a 63 y.o. female here for a non-provider visit for:   HEPATITIS B 1 of 1    Reason for immunization: continue or complete series started at the office  Immunization records indicate need for vaccine: Yes, confirmed with Epic  Minimum interval has been met for this vaccine: Yes  ABN completed: Not Indicated    Order and dose verified by: FIDEL DE SOUZA  VIS Dated  07/20/2016 was given to patient: Yes  All IAC Questionnaire questions were answered \"No.\"    Patient tolerated injection and no adverse effects were observed or reported: Yes    Pt scheduled for next dose in series: No    " Roly Sanchez(Attending)

## 2020-10-06 ENCOUNTER — HOSPITAL ENCOUNTER (OUTPATIENT)
Dept: RADIOLOGY | Facility: MEDICAL CENTER | Age: 65
End: 2020-10-06
Attending: NEUROLOGICAL SURGERY
Payer: MEDICARE

## 2020-10-06 DIAGNOSIS — M54.50 LOW BACK PAIN, UNSPECIFIED BACK PAIN LATERALITY, UNSPECIFIED CHRONICITY, UNSPECIFIED WHETHER SCIATICA PRESENT: ICD-10-CM

## 2020-10-06 PROCEDURE — 72110 X-RAY EXAM L-2 SPINE 4/>VWS: CPT

## 2020-10-07 ENCOUNTER — HOSPITAL ENCOUNTER (OUTPATIENT)
Dept: RADIOLOGY | Facility: MEDICAL CENTER | Age: 65
End: 2020-10-07
Attending: NURSE PRACTITIONER
Payer: MEDICARE

## 2020-10-07 DIAGNOSIS — Z12.31 VISIT FOR SCREENING MAMMOGRAM: ICD-10-CM

## 2020-10-07 PROCEDURE — 77067 SCR MAMMO BI INCL CAD: CPT

## 2020-11-03 ENCOUNTER — APPOINTMENT (OUTPATIENT)
Dept: MEDICAL GROUP | Facility: MEDICAL CENTER | Age: 65
End: 2020-11-03
Payer: MEDICARE

## 2020-11-05 DIAGNOSIS — E11.9 TYPE 2 DIABETES MELLITUS WITHOUT COMPLICATION, WITHOUT LONG-TERM CURRENT USE OF INSULIN (HCC): ICD-10-CM

## 2020-11-05 RX ORDER — METFORMIN HYDROCHLORIDE 500 MG/1
500 TABLET, EXTENDED RELEASE ORAL
Qty: 270 TAB | Refills: 1 | Status: SHIPPED | OUTPATIENT
Start: 2020-11-05 | End: 2022-07-28 | Stop reason: SDUPTHER

## 2020-11-24 DIAGNOSIS — F41.9 ACUTE ANXIETY: ICD-10-CM

## 2020-11-24 RX ORDER — DIAZEPAM 5 MG/1
TABLET ORAL
Qty: 1 TAB | Refills: 0 | OUTPATIENT
Start: 2020-11-24 | End: 2020-11-26

## 2020-12-01 DIAGNOSIS — F41.9 ACUTE ANXIETY: ICD-10-CM

## 2020-12-01 RX ORDER — DIAZEPAM 5 MG/1
TABLET ORAL
Qty: 1 TAB | Refills: 0 | OUTPATIENT
Start: 2020-12-01 | End: 2020-12-03

## 2020-12-01 NOTE — TELEPHONE ENCOUNTER
Unable to reach patient at this time. Left a detailed message and will also send a mychart message.

## 2020-12-31 ENCOUNTER — PATIENT MESSAGE (OUTPATIENT)
Dept: MEDICAL GROUP | Facility: MEDICAL CENTER | Age: 65
End: 2020-12-31

## 2020-12-31 DIAGNOSIS — Z11.59 SCREENING FOR VIRAL DISEASE: ICD-10-CM

## 2021-01-01 NOTE — TELEPHONE ENCOUNTER
Left detailed message for patient requesting to call the office back so we can discuss symptoms and getting an order placed.

## 2021-01-04 ENCOUNTER — PATIENT MESSAGE (OUTPATIENT)
Dept: MEDICAL GROUP | Facility: MEDICAL CENTER | Age: 66
End: 2021-01-04

## 2021-01-04 NOTE — TELEPHONE ENCOUNTER
From: Che Tovar  To: ONELIA Johnson  Sent: 1/4/2021 10:35 AM PST  Subject: Non-Urgent Medical Question    Good morning  do I need to come in and get the referal paperwork for my Covid Test? I hope to get the test on Wednesday this week.      ----- Message -----   From:ONELIA Johnson   Sent:12/31/2020 5:15 PM PST   To:Che Tovar   Subject:RE: Non-Urgent Medical Question    Hello,  I will place an order so that you can go at your convenience when you are returned. Hears some information about the test site:  [email or web address]  Allison MAYA      ----- Message -----   From:Che oTvar   Sent:12/31/2020 3:58 PM PST   To:ONELIA Johnson   Subject:Non-Urgent Medical Question    I will be returning to work in [place] on January 10th,2021. I will need to take a Covid test 5 days before my return to work. Do I need a lab request from Dr. Will? and where do I go to get this test done?

## 2021-01-06 ENCOUNTER — HOSPITAL ENCOUNTER (OUTPATIENT)
Dept: LAB | Facility: MEDICAL CENTER | Age: 66
End: 2021-01-06
Attending: NURSE PRACTITIONER
Payer: MEDICARE

## 2021-01-06 DIAGNOSIS — Z11.59 SCREENING FOR VIRAL DISEASE: ICD-10-CM

## 2021-01-06 LAB
COVID ORDER STATUS COVID19: NORMAL
SARS-COV-2 RNA RESP QL NAA+PROBE: NOTDETECTED
SPECIMEN SOURCE: NORMAL

## 2021-01-06 PROCEDURE — U0003 INFECTIOUS AGENT DETECTION BY NUCLEIC ACID (DNA OR RNA); SEVERE ACUTE RESPIRATORY SYNDROME CORONAVIRUS 2 (SARS-COV-2) (CORONAVIRUS DISEASE [COVID-19]), AMPLIFIED PROBE TECHNIQUE, MAKING USE OF HIGH THROUGHPUT TECHNOLOGIES AS DESCRIBED BY CMS-2020-01-R: HCPCS

## 2021-01-06 PROCEDURE — C9803 HOPD COVID-19 SPEC COLLECT: HCPCS

## 2021-01-06 PROCEDURE — U0005 INFEC AGEN DETEC AMPLI PROBE: HCPCS

## 2021-01-14 DIAGNOSIS — E11.9 TYPE 2 DIABETES MELLITUS WITHOUT COMPLICATION, WITHOUT LONG-TERM CURRENT USE OF INSULIN (HCC): ICD-10-CM

## 2021-01-15 RX ORDER — DULAGLUTIDE 1.5 MG/.5ML
0.5 INJECTION, SOLUTION SUBCUTANEOUS
Qty: 2 ML | Refills: 3 | Status: SHIPPED | OUTPATIENT
Start: 2021-01-15 | End: 2021-04-19

## 2021-01-16 ENCOUNTER — PATIENT MESSAGE (OUTPATIENT)
Dept: MEDICAL GROUP | Facility: MEDICAL CENTER | Age: 66
End: 2021-01-16

## 2021-01-21 ENCOUNTER — PATIENT MESSAGE (OUTPATIENT)
Dept: MEDICAL GROUP | Facility: MEDICAL CENTER | Age: 66
End: 2021-01-21

## 2021-01-21 DIAGNOSIS — E03.9 ACQUIRED HYPOTHYROIDISM: ICD-10-CM

## 2021-01-21 DIAGNOSIS — E78.00 PURE HYPERCHOLESTEROLEMIA: ICD-10-CM

## 2021-01-21 DIAGNOSIS — Z98.84 S/P GASTRIC BYPASS: ICD-10-CM

## 2021-01-21 DIAGNOSIS — E11.9 TYPE 2 DIABETES MELLITUS WITHOUT COMPLICATION, WITHOUT LONG-TERM CURRENT USE OF INSULIN (HCC): ICD-10-CM

## 2021-01-21 NOTE — TELEPHONE ENCOUNTER
From: Che Tovar  To: ONELIA Johnson  Sent: 1/21/2021 3:07 PM PST  Subject: Non-Urgent Medical Question    luis alberto li.  I would like to have the blood work done. Right now I take a multi vitamin 3 times a day. Calcium citrate 2 tabs at each meal. B1, B12, Iron pill twice a week, Vitamin C 1000mg every day, D3 every morning, CoQ10,  Sublingual B6/B12 3 times a week, Plus my metformin 3 times a day. Thyroid meds once a day, colestoral meds once a day, high blood preasure meds once a day, trazadone at night. Gabapentin 300mg twice a day.      ----- Message -----   From:ONELIA Johnson   Sent:1/21/2021 9:00 AM PST   To:Che Tovar   Subject:RE: Non-Urgent Medical Question    Good morning,  After Pierre-en-Y surgery most people take a multivitamin containing iron, you want to be sure that it has at least 18 mg of iron. You may need to take an additional calcium supplement. Calcium citrate is what is absorbed best after bariatric surgery, recommended dose for postmenopausal women's 1200 mg daily. I do not see a specific recommendation for B12 supplement, but I do know that sometimes this can begin low. I would suggest that we check your labs and then I can make a recommendation on how much she should take, or if you to be a candidate for the shot. Would you like me to order some lab work?  You may be taking some unnecessary vitamins right now.  Allison MAYA      ----- Message -----   From:Che Tovar   Sent:1/16/2021 2:00 PM PST   To:ONELIA Johnson   Subject:Non-Urgent Medical Question    I have has [facility] gastric bypass surgery about 8 years ago. I know that they are always tweaking the Vitamin requirements for us and the frequency of when to take these vitamins. I tried finding the current guide lines but haven't had much luck. Can you point me in the right direction for this information. Does [facility] have a support group for it's gastric bypass patients?  Also...am I a candidate for B12 shot? I take about 15 vitamins a day now. If I could eliminate even 1, it would be great.   Thanks in advance for your help.

## 2021-01-28 ENCOUNTER — PATIENT MESSAGE (OUTPATIENT)
Dept: MEDICAL GROUP | Facility: MEDICAL CENTER | Age: 66
End: 2021-01-28

## 2021-01-28 DIAGNOSIS — E11.9 TYPE 2 DIABETES MELLITUS WITHOUT COMPLICATION, WITHOUT LONG-TERM CURRENT USE OF INSULIN (HCC): ICD-10-CM

## 2021-01-28 RX ORDER — BENZONATATE 100 MG/1
100 CAPSULE ORAL 3 TIMES DAILY PRN
Qty: 60 CAP | Refills: 0 | Status: SHIPPED | OUTPATIENT
Start: 2021-01-28 | End: 2021-06-07

## 2021-01-28 NOTE — PATIENT COMMUNICATION
Received request via: Patient    Was the patient seen in the last year in this department? Yes    Does the patient have an active prescription (recently filled or refills available) for medication(s) requested? No     Requested Prescriptions     Pending Prescriptions Disp Refills   • benzonatate (TESSALON) 100 MG Cap 60 Cap 0     Sig: Take 1 Cap by mouth 3 times a day as needed for Cough.

## 2021-02-01 ENCOUNTER — HOSPITAL ENCOUNTER (OUTPATIENT)
Dept: LAB | Facility: MEDICAL CENTER | Age: 66
End: 2021-02-01
Attending: NURSE PRACTITIONER
Payer: MEDICARE

## 2021-02-01 DIAGNOSIS — E78.00 PURE HYPERCHOLESTEROLEMIA: ICD-10-CM

## 2021-02-01 DIAGNOSIS — Z98.84 S/P GASTRIC BYPASS: ICD-10-CM

## 2021-02-01 DIAGNOSIS — E03.9 ACQUIRED HYPOTHYROIDISM: ICD-10-CM

## 2021-02-01 DIAGNOSIS — E11.9 TYPE 2 DIABETES MELLITUS WITHOUT COMPLICATION, WITHOUT LONG-TERM CURRENT USE OF INSULIN (HCC): ICD-10-CM

## 2021-02-01 LAB
ALBUMIN SERPL BCP-MCNC: 4 G/DL (ref 3.2–4.9)
ALBUMIN/GLOB SERPL: 1.2 G/DL
ALP SERPL-CCNC: 76 U/L (ref 30–99)
ALT SERPL-CCNC: 25 U/L (ref 2–50)
ANION GAP SERPL CALC-SCNC: 10 MMOL/L (ref 7–16)
AST SERPL-CCNC: 24 U/L (ref 12–45)
BASOPHILS # BLD AUTO: 0.6 % (ref 0–1.8)
BASOPHILS # BLD: 0.03 K/UL (ref 0–0.12)
BILIRUB SERPL-MCNC: 0.9 MG/DL (ref 0.1–1.5)
BUN SERPL-MCNC: 8 MG/DL (ref 8–22)
CALCIUM SERPL-MCNC: 9.8 MG/DL (ref 8.4–10.2)
CHLORIDE SERPL-SCNC: 100 MMOL/L (ref 96–112)
CO2 SERPL-SCNC: 28 MMOL/L (ref 20–33)
CREAT SERPL-MCNC: 0.59 MG/DL (ref 0.5–1.4)
EOSINOPHIL # BLD AUTO: 0.06 K/UL (ref 0–0.51)
EOSINOPHIL NFR BLD: 1.3 % (ref 0–6.9)
ERYTHROCYTE [DISTWIDTH] IN BLOOD BY AUTOMATED COUNT: 40.4 FL (ref 35.9–50)
EST. AVERAGE GLUCOSE BLD GHB EST-MCNC: 146 MG/DL
FASTING STATUS PATIENT QL REPORTED: NORMAL
GLOBULIN SER CALC-MCNC: 3.3 G/DL (ref 1.9–3.5)
GLUCOSE SERPL-MCNC: 116 MG/DL (ref 65–99)
HBA1C MFR BLD: 6.7 % (ref 0–5.6)
HCT VFR BLD AUTO: 40.2 % (ref 37–47)
HGB BLD-MCNC: 13.5 G/DL (ref 12–16)
IMM GRANULOCYTES # BLD AUTO: 0.01 K/UL (ref 0–0.11)
IMM GRANULOCYTES NFR BLD AUTO: 0.2 % (ref 0–0.9)
IRON SATN MFR SERPL: 44 % (ref 15–55)
IRON SERPL-MCNC: 142 UG/DL (ref 40–170)
LYMPHOCYTES # BLD AUTO: 1.62 K/UL (ref 1–4.8)
LYMPHOCYTES NFR BLD: 34 % (ref 22–41)
MCH RBC QN AUTO: 31.6 PG (ref 27–33)
MCHC RBC AUTO-ENTMCNC: 33.6 G/DL (ref 33.6–35)
MCV RBC AUTO: 94.1 FL (ref 81.4–97.8)
MONOCYTES # BLD AUTO: 0.36 K/UL (ref 0–0.85)
MONOCYTES NFR BLD AUTO: 7.5 % (ref 0–13.4)
NEUTROPHILS # BLD AUTO: 2.69 K/UL (ref 2–7.15)
NEUTROPHILS NFR BLD: 56.4 % (ref 44–72)
NRBC # BLD AUTO: 0 K/UL
NRBC BLD-RTO: 0 /100 WBC
PLATELET # BLD AUTO: 226 K/UL (ref 164–446)
PMV BLD AUTO: 10.5 FL (ref 9–12.9)
POTASSIUM SERPL-SCNC: 4.5 MMOL/L (ref 3.6–5.5)
PROT SERPL-MCNC: 7.3 G/DL (ref 6–8.2)
RBC # BLD AUTO: 4.27 M/UL (ref 4.2–5.4)
SODIUM SERPL-SCNC: 138 MMOL/L (ref 135–145)
TIBC SERPL-MCNC: 324 UG/DL (ref 250–450)
TSH SERPL DL<=0.005 MIU/L-ACNC: 0.59 UIU/ML (ref 0.38–5.33)
UIBC SERPL-MCNC: 182 UG/DL (ref 110–370)
WBC # BLD AUTO: 4.8 K/UL (ref 4.8–10.8)

## 2021-02-01 PROCEDURE — 80053 COMPREHEN METABOLIC PANEL: CPT

## 2021-02-01 PROCEDURE — 84443 ASSAY THYROID STIM HORMONE: CPT

## 2021-02-01 PROCEDURE — 83540 ASSAY OF IRON: CPT

## 2021-02-01 PROCEDURE — 83036 HEMOGLOBIN GLYCOSYLATED A1C: CPT | Mod: GA

## 2021-02-01 PROCEDURE — 36415 COLL VENOUS BLD VENIPUNCTURE: CPT | Mod: GA

## 2021-02-01 PROCEDURE — 85025 COMPLETE CBC W/AUTO DIFF WBC: CPT

## 2021-02-01 PROCEDURE — 83550 IRON BINDING TEST: CPT

## 2021-02-04 DIAGNOSIS — F41.1 GAD (GENERALIZED ANXIETY DISORDER): ICD-10-CM

## 2021-02-04 RX ORDER — ESCITALOPRAM OXALATE 10 MG/1
10 TABLET ORAL DAILY
Qty: 90 TAB | Refills: 3 | Status: SHIPPED | OUTPATIENT
Start: 2021-02-04 | End: 2021-06-07

## 2021-02-04 NOTE — TELEPHONE ENCOUNTER
Received request via: Pharmacy    Was the patient seen in the last year in this department? Yes    Does the patient have an active prescription (recently filled or refills available) for medication(s) requested? No     Requested Prescriptions     Pending Prescriptions Disp Refills   • escitalopram (LEXAPRO) 10 MG Tab 90 Tab 3     Sig: Take 1 Tab by mouth every day. 1/2 tab PO daily x 1 week then 1 tab PO daily

## 2021-02-28 ENCOUNTER — PATIENT MESSAGE (OUTPATIENT)
Dept: MEDICAL GROUP | Facility: MEDICAL CENTER | Age: 66
End: 2021-02-28

## 2021-02-28 DIAGNOSIS — Z11.59 SCREENING FOR VIRAL DISEASE: ICD-10-CM

## 2021-03-01 NOTE — TELEPHONE ENCOUNTER
From: Che Tovar  To: Nurse Practicioner Delisa Will  Sent: 2/28/2021 11:32 AM PST  Subject: Non-Urgent Medical Question    I need a covid 19 test . I just returned home from working in [place].  Also , will I be notified when it is my turn to receive the Covid vaccine?

## 2021-03-03 ENCOUNTER — NON-PROVIDER VISIT (OUTPATIENT)
Dept: MEDICAL GROUP | Facility: MEDICAL CENTER | Age: 66
End: 2021-03-03
Payer: MEDICARE

## 2021-03-03 DIAGNOSIS — Z23 NEED FOR VACCINATION: ICD-10-CM

## 2021-03-03 PROCEDURE — 90746 HEPB VACCINE 3 DOSE ADULT IM: CPT | Performed by: NURSE PRACTITIONER

## 2021-03-03 PROCEDURE — 90750 HZV VACC RECOMBINANT IM: CPT | Performed by: NURSE PRACTITIONER

## 2021-03-03 PROCEDURE — G0010 ADMIN HEPATITIS B VACCINE: HCPCS | Performed by: NURSE PRACTITIONER

## 2021-03-03 PROCEDURE — 90472 IMMUNIZATION ADMIN EACH ADD: CPT | Performed by: NURSE PRACTITIONER

## 2021-03-03 PROCEDURE — 90715 TDAP VACCINE 7 YRS/> IM: CPT | Performed by: NURSE PRACTITIONER

## 2021-03-03 NOTE — NON-PROVIDER
"Concetta Tovar is a 65 y.o. female here for a non-provider visit for:   DTaP  1 of 1    Reason for immunization: Overdue/Provider Recommended  Immunization records indicate need for vaccine: Yes, confirmed with Epic  Minimum interval has been met for this vaccine: Yes  ABN completed: Yes    Order and dose verified by: yes  VIS Dated  04/01/2020 was given to patient: Yes  All IAC Questionnaire questions were answered \"No.\"    Patient tolerated injection and no adverse effects were observed or reported: Yes    Pt scheduled for next dose in series: No  "

## 2021-03-04 ENCOUNTER — HOSPITAL ENCOUNTER (OUTPATIENT)
Dept: LAB | Facility: MEDICAL CENTER | Age: 66
End: 2021-03-04
Attending: NURSE PRACTITIONER
Payer: MEDICARE

## 2021-03-04 DIAGNOSIS — Z11.59 SCREENING FOR VIRAL DISEASE: ICD-10-CM

## 2021-03-04 PROCEDURE — U0003 INFECTIOUS AGENT DETECTION BY NUCLEIC ACID (DNA OR RNA); SEVERE ACUTE RESPIRATORY SYNDROME CORONAVIRUS 2 (SARS-COV-2) (CORONAVIRUS DISEASE [COVID-19]), AMPLIFIED PROBE TECHNIQUE, MAKING USE OF HIGH THROUGHPUT TECHNOLOGIES AS DESCRIBED BY CMS-2020-01-R: HCPCS

## 2021-03-04 PROCEDURE — U0005 INFEC AGEN DETEC AMPLI PROBE: HCPCS

## 2021-03-04 PROCEDURE — C9803 HOPD COVID-19 SPEC COLLECT: HCPCS

## 2021-03-21 ENCOUNTER — PATIENT MESSAGE (OUTPATIENT)
Dept: MEDICAL GROUP | Facility: MEDICAL CENTER | Age: 66
End: 2021-03-21

## 2021-03-21 DIAGNOSIS — R09.81 CHRONIC NASAL CONGESTION: ICD-10-CM

## 2021-04-18 DIAGNOSIS — E11.9 TYPE 2 DIABETES MELLITUS WITHOUT COMPLICATION, WITHOUT LONG-TERM CURRENT USE OF INSULIN (HCC): ICD-10-CM

## 2021-04-19 RX ORDER — DULAGLUTIDE 1.5 MG/.5ML
INJECTION, SOLUTION SUBCUTANEOUS
Qty: 2 ML | Refills: 3 | Status: SHIPPED | OUTPATIENT
Start: 2021-04-19 | End: 2021-06-01

## 2021-05-30 DIAGNOSIS — E11.9 TYPE 2 DIABETES MELLITUS WITHOUT COMPLICATION, WITHOUT LONG-TERM CURRENT USE OF INSULIN (HCC): ICD-10-CM

## 2021-06-01 RX ORDER — DULAGLUTIDE 1.5 MG/.5ML
INJECTION, SOLUTION SUBCUTANEOUS
Qty: 2 ML | Refills: 3 | Status: SHIPPED | OUTPATIENT
Start: 2021-06-01 | End: 2021-06-16

## 2021-06-01 NOTE — TELEPHONE ENCOUNTER
Received request via: Pharmacy    Was the patient seen in the last year in this department? Yes    Does the patient have an active prescription (recently filled or refills available) for medication(s) requested? No     Requested Prescriptions     Pending Prescriptions Disp Refills   • TRULICITY 1.5 MG/0.5ML Solution Pen-injector [Pharmacy Med Name: TRULICITY 1.5MG/0.5ML SDP 0.5ML] 2 mL 3     Sig: ADMINISTER 1.5 MG UNDER THE SKIN EVERY 7 DAYS

## 2021-06-07 ENCOUNTER — HOSPITAL ENCOUNTER (OUTPATIENT)
Dept: RADIOLOGY | Facility: MEDICAL CENTER | Age: 66
DRG: 455 | End: 2021-06-07
Attending: NEUROLOGICAL SURGERY | Admitting: NEUROLOGICAL SURGERY
Payer: MEDICARE

## 2021-06-07 ENCOUNTER — PRE-ADMISSION TESTING (OUTPATIENT)
Dept: ADMISSIONS | Facility: MEDICAL CENTER | Age: 66
DRG: 455 | End: 2021-06-07
Attending: NEUROLOGICAL SURGERY
Payer: MEDICARE

## 2021-06-07 ENCOUNTER — APPOINTMENT (OUTPATIENT)
Dept: RADIOLOGY | Facility: MEDICAL CENTER | Age: 66
DRG: 455 | End: 2021-06-07
Attending: NEUROLOGICAL SURGERY
Payer: MEDICARE

## 2021-06-07 DIAGNOSIS — Z01.811 PRE-OPERATIVE RESPIRATORY EXAMINATION: ICD-10-CM

## 2021-06-07 DIAGNOSIS — Z01.812 PRE-OPERATIVE LABORATORY EXAMINATION: ICD-10-CM

## 2021-06-07 DIAGNOSIS — Z01.810 PRE-OPERATIVE CARDIOVASCULAR EXAMINATION: ICD-10-CM

## 2021-06-07 LAB
ABO GROUP BLD: NORMAL
ANION GAP SERPL CALC-SCNC: 9 MMOL/L (ref 7–16)
APPEARANCE UR: CLEAR
APTT PPP: 23.9 SEC (ref 24.7–36)
BASOPHILS # BLD AUTO: 0.7 % (ref 0–1.8)
BASOPHILS # BLD: 0.05 K/UL (ref 0–0.12)
BILIRUB UR QL STRIP.AUTO: NEGATIVE
BLD GP AB SCN SERPL QL: NORMAL
BUN SERPL-MCNC: 13 MG/DL (ref 8–22)
CALCIUM SERPL-MCNC: 9.5 MG/DL (ref 8.5–10.5)
CHLORIDE SERPL-SCNC: 101 MMOL/L (ref 96–112)
CO2 SERPL-SCNC: 25 MMOL/L (ref 20–33)
COLOR UR: NORMAL
CREAT SERPL-MCNC: 0.65 MG/DL (ref 0.5–1.4)
EKG IMPRESSION: NORMAL
EOSINOPHIL # BLD AUTO: 0.09 K/UL (ref 0–0.51)
EOSINOPHIL NFR BLD: 1.3 % (ref 0–6.9)
ERYTHROCYTE [DISTWIDTH] IN BLOOD BY AUTOMATED COUNT: 44.8 FL (ref 35.9–50)
GLUCOSE SERPL-MCNC: 117 MG/DL (ref 65–99)
GLUCOSE UR STRIP.AUTO-MCNC: NEGATIVE MG/DL
HCT VFR BLD AUTO: 42 % (ref 37–47)
HGB BLD-MCNC: 13.5 G/DL (ref 12–16)
IMM GRANULOCYTES # BLD AUTO: 0.02 K/UL (ref 0–0.11)
IMM GRANULOCYTES NFR BLD AUTO: 0.3 % (ref 0–0.9)
INR PPP: 0.99 (ref 0.87–1.13)
KETONES UR STRIP.AUTO-MCNC: NEGATIVE MG/DL
LEUKOCYTE ESTERASE UR QL STRIP.AUTO: NEGATIVE
LYMPHOCYTES # BLD AUTO: 1.52 K/UL (ref 1–4.8)
LYMPHOCYTES NFR BLD: 22 % (ref 22–41)
MCH RBC QN AUTO: 30.5 PG (ref 27–33)
MCHC RBC AUTO-ENTMCNC: 32.1 G/DL (ref 33.6–35)
MCV RBC AUTO: 94.8 FL (ref 81.4–97.8)
MICRO URNS: NORMAL
MONOCYTES # BLD AUTO: 0.46 K/UL (ref 0–0.85)
MONOCYTES NFR BLD AUTO: 6.7 % (ref 0–13.4)
NEUTROPHILS # BLD AUTO: 4.76 K/UL (ref 2–7.15)
NEUTROPHILS NFR BLD: 69 % (ref 44–72)
NITRITE UR QL STRIP.AUTO: NEGATIVE
NRBC # BLD AUTO: 0 K/UL
NRBC BLD-RTO: 0 /100 WBC
PH UR STRIP.AUTO: 6 [PH] (ref 5–8)
PLATELET # BLD AUTO: 212 K/UL (ref 164–446)
PMV BLD AUTO: 11.6 FL (ref 9–12.9)
POTASSIUM SERPL-SCNC: 3.9 MMOL/L (ref 3.6–5.5)
PROT UR QL STRIP: NEGATIVE MG/DL
PROTHROMBIN TIME: 13.4 SEC (ref 12–14.6)
RBC # BLD AUTO: 4.43 M/UL (ref 4.2–5.4)
RBC UR QL AUTO: NEGATIVE
RH BLD: NORMAL
SODIUM SERPL-SCNC: 135 MMOL/L (ref 135–145)
SP GR UR STRIP.AUTO: 1.02
UROBILINOGEN UR STRIP.AUTO-MCNC: 0.2 MG/DL
WBC # BLD AUTO: 6.9 K/UL (ref 4.8–10.8)

## 2021-06-07 PROCEDURE — 86901 BLOOD TYPING SEROLOGIC RH(D): CPT

## 2021-06-07 PROCEDURE — 93005 ELECTROCARDIOGRAM TRACING: CPT

## 2021-06-07 PROCEDURE — 85610 PROTHROMBIN TIME: CPT

## 2021-06-07 PROCEDURE — 93010 ELECTROCARDIOGRAM REPORT: CPT | Performed by: INTERNAL MEDICINE

## 2021-06-07 PROCEDURE — 86900 BLOOD TYPING SEROLOGIC ABO: CPT

## 2021-06-07 PROCEDURE — 82306 VITAMIN D 25 HYDROXY: CPT

## 2021-06-07 PROCEDURE — 80048 BASIC METABOLIC PNL TOTAL CA: CPT

## 2021-06-07 PROCEDURE — 81003 URINALYSIS AUTO W/O SCOPE: CPT

## 2021-06-07 PROCEDURE — 85025 COMPLETE CBC W/AUTO DIFF WBC: CPT

## 2021-06-07 PROCEDURE — 85730 THROMBOPLASTIN TIME PARTIAL: CPT

## 2021-06-07 PROCEDURE — 86850 RBC ANTIBODY SCREEN: CPT

## 2021-06-07 PROCEDURE — 36415 COLL VENOUS BLD VENIPUNCTURE: CPT

## 2021-06-07 PROCEDURE — 71045 X-RAY EXAM CHEST 1 VIEW: CPT

## 2021-06-07 RX ORDER — IBUPROFEN 200 MG
950 CAPSULE ORAL DAILY
Status: ON HOLD | COMMUNITY
End: 2021-06-21

## 2021-06-07 RX ORDER — M-VIT,TX,IRON,MINS/CALC/FOLIC 27MG-0.4MG
1 TABLET ORAL 3 TIMES DAILY
Status: ON HOLD | COMMUNITY
End: 2021-06-21

## 2021-06-07 RX ORDER — VITAMIN B COMPLEX
1000 TABLET ORAL DAILY
Status: ON HOLD | COMMUNITY
End: 2021-06-21

## 2021-06-07 RX ORDER — GABAPENTIN 300 MG/1
300 CAPSULE ORAL 3 TIMES DAILY
COMMUNITY
End: 2021-08-11

## 2021-06-07 RX ORDER — FERROUS SULFATE 325(65) MG
325 TABLET ORAL
COMMUNITY

## 2021-06-07 RX ORDER — MULTIVIT WITH MINERALS/LUTEIN
1000 TABLET ORAL DAILY
COMMUNITY

## 2021-06-07 ASSESSMENT — FIBROSIS 4 INDEX: FIB4 SCORE: 1.42

## 2021-06-07 NOTE — PREPROCEDURE INSTRUCTIONS
MEDICATION INSTRUCTIONS GIVEN PER ANESTHESIA PROTOCOL.  PATIENT HESITANT TO HOLD VITAMINS; STATES SHE HAS BEEN TAKING THEM SINCE HER GASTRIC BYPASS.,  SHE WILL CALL HER

## 2021-06-09 LAB — 25(OH)D3 SERPL-MCNC: 48 NG/ML (ref 30–80)

## 2021-06-16 DIAGNOSIS — E11.9 TYPE 2 DIABETES MELLITUS WITHOUT COMPLICATION, WITHOUT LONG-TERM CURRENT USE OF INSULIN (HCC): ICD-10-CM

## 2021-06-16 RX ORDER — DULAGLUTIDE 1.5 MG/.5ML
INJECTION, SOLUTION SUBCUTANEOUS
Qty: 2 ML | Refills: 3 | Status: SHIPPED | OUTPATIENT
Start: 2021-06-16 | End: 2021-07-23

## 2021-06-21 ENCOUNTER — APPOINTMENT (OUTPATIENT)
Dept: RADIOLOGY | Facility: MEDICAL CENTER | Age: 66
DRG: 455 | End: 2021-06-21
Attending: NEUROLOGICAL SURGERY
Payer: MEDICARE

## 2021-06-21 ENCOUNTER — ANESTHESIA EVENT (OUTPATIENT)
Dept: SURGERY | Facility: MEDICAL CENTER | Age: 66
DRG: 455 | End: 2021-06-21
Payer: MEDICARE

## 2021-06-21 ENCOUNTER — HOSPITAL ENCOUNTER (INPATIENT)
Facility: MEDICAL CENTER | Age: 66
LOS: 1 days | DRG: 455 | End: 2021-06-23
Attending: NEUROLOGICAL SURGERY | Admitting: NEUROLOGICAL SURGERY
Payer: MEDICARE

## 2021-06-21 ENCOUNTER — ANESTHESIA (OUTPATIENT)
Dept: SURGERY | Facility: MEDICAL CENTER | Age: 66
DRG: 455 | End: 2021-06-21
Payer: MEDICARE

## 2021-06-21 DIAGNOSIS — M48.062 LUMBAR STENOSIS WITH NEUROGENIC CLAUDICATION: ICD-10-CM

## 2021-06-21 DIAGNOSIS — G89.18 POSTOPERATIVE PAIN AFTER SPINAL SURGERY: ICD-10-CM

## 2021-06-21 LAB
ABO + RH BLD: NORMAL
GLUCOSE BLD-MCNC: 109 MG/DL (ref 65–99)

## 2021-06-21 PROCEDURE — 72100 X-RAY EXAM L-S SPINE 2/3 VWS: CPT

## 2021-06-21 PROCEDURE — 22633 ARTHRD CMBN 1NTRSPC LUMBAR: CPT | Performed by: NEUROLOGICAL SURGERY

## 2021-06-21 PROCEDURE — 22853 INSJ BIOMECHANICAL DEVICE: CPT | Performed by: NEUROLOGICAL SURGERY

## 2021-06-21 PROCEDURE — 700111 HCHG RX REV CODE 636 W/ 250 OVERRIDE (IP): Performed by: ANESTHESIOLOGY

## 2021-06-21 PROCEDURE — 22840 INSERT SPINE FIXATION DEVICE: CPT | Mod: ASROC | Performed by: PHYSICIAN ASSISTANT

## 2021-06-21 PROCEDURE — 700106 HCHG RX REV CODE 271: Performed by: NEUROLOGICAL SURGERY

## 2021-06-21 PROCEDURE — 500367 HCHG DRAIN KIT, HEMOVAC: Performed by: NEUROLOGICAL SURGERY

## 2021-06-21 PROCEDURE — 700101 HCHG RX REV CODE 250: Performed by: PHYSICIAN ASSISTANT

## 2021-06-21 PROCEDURE — 700105 HCHG RX REV CODE 258: Performed by: NEUROLOGICAL SURGERY

## 2021-06-21 PROCEDURE — 95940 IONM IN OPERATNG ROOM 15 MIN: CPT | Performed by: NEUROLOGICAL SURGERY

## 2021-06-21 PROCEDURE — 0SG30AJ FUSION OF LUMBOSACRAL JOINT WITH INTERBODY FUSION DEVICE, POSTERIOR APPROACH, ANTERIOR COLUMN, OPEN APPROACH: ICD-10-PCS | Performed by: NEUROLOGICAL SURGERY

## 2021-06-21 PROCEDURE — 502000 HCHG MISC OR IMPLANTS RC 0278: Performed by: NEUROLOGICAL SURGERY

## 2021-06-21 PROCEDURE — 700111 HCHG RX REV CODE 636 W/ 250 OVERRIDE (IP): Performed by: PHYSICIAN ASSISTANT

## 2021-06-21 PROCEDURE — 8E0WXBZ COMPUTER ASSISTED PROCEDURE OF TRUNK REGION: ICD-10-PCS | Performed by: NEUROLOGICAL SURGERY

## 2021-06-21 PROCEDURE — 01NB0ZZ RELEASE LUMBAR NERVE, OPEN APPROACH: ICD-10-PCS | Performed by: NEUROLOGICAL SURGERY

## 2021-06-21 PROCEDURE — 82962 GLUCOSE BLOOD TEST: CPT

## 2021-06-21 PROCEDURE — 20936 SP BONE AGRFT LOCAL ADD-ON: CPT | Performed by: NEUROLOGICAL SURGERY

## 2021-06-21 PROCEDURE — 95937 NEUROMUSCULAR JUNCTION TEST: CPT | Performed by: NEUROLOGICAL SURGERY

## 2021-06-21 PROCEDURE — 700101 HCHG RX REV CODE 250: Performed by: ANESTHESIOLOGY

## 2021-06-21 PROCEDURE — 160036 HCHG PACU - EA ADDL 30 MINS PHASE I: Performed by: NEUROLOGICAL SURGERY

## 2021-06-21 PROCEDURE — 0SG3071 FUSION OF LUMBOSACRAL JOINT WITH AUTOLOGOUS TISSUE SUBSTITUTE, POSTERIOR APPROACH, POSTERIOR COLUMN, OPEN APPROACH: ICD-10-PCS | Performed by: NEUROLOGICAL SURGERY

## 2021-06-21 PROCEDURE — 96375 TX/PRO/DX INJ NEW DRUG ADDON: CPT

## 2021-06-21 PROCEDURE — A9270 NON-COVERED ITEM OR SERVICE: HCPCS | Performed by: PHYSICIAN ASSISTANT

## 2021-06-21 PROCEDURE — 160002 HCHG RECOVERY MINUTES (STAT): Performed by: NEUROLOGICAL SURGERY

## 2021-06-21 PROCEDURE — 700102 HCHG RX REV CODE 250 W/ 637 OVERRIDE(OP): Performed by: ANESTHESIOLOGY

## 2021-06-21 PROCEDURE — 0ST40ZZ RESECTION OF LUMBOSACRAL DISC, OPEN APPROACH: ICD-10-PCS | Performed by: NEUROLOGICAL SURGERY

## 2021-06-21 PROCEDURE — L8699 PROSTHETIC IMPLANT NOS: HCPCS | Performed by: NEUROLOGICAL SURGERY

## 2021-06-21 PROCEDURE — A9270 NON-COVERED ITEM OR SERVICE: HCPCS | Performed by: NEUROLOGICAL SURGERY

## 2021-06-21 PROCEDURE — 96365 THER/PROPH/DIAG IV INF INIT: CPT

## 2021-06-21 PROCEDURE — 160009 HCHG ANES TIME/MIN: Performed by: NEUROLOGICAL SURGERY

## 2021-06-21 PROCEDURE — 160042 HCHG SURGERY MINUTES - EA ADDL 1 MIN LEVEL 5: Performed by: NEUROLOGICAL SURGERY

## 2021-06-21 PROCEDURE — 01NR0ZZ RELEASE SACRAL NERVE, OPEN APPROACH: ICD-10-PCS | Performed by: NEUROLOGICAL SURGERY

## 2021-06-21 PROCEDURE — C1713 ANCHOR/SCREW BN/BN,TIS/BN: HCPCS | Performed by: NEUROLOGICAL SURGERY

## 2021-06-21 PROCEDURE — A9270 NON-COVERED ITEM OR SERVICE: HCPCS | Performed by: ANESTHESIOLOGY

## 2021-06-21 PROCEDURE — 502708 HCHG CATHETER, ON-Q SILVER SKR: Performed by: NEUROLOGICAL SURGERY

## 2021-06-21 PROCEDURE — 22840 INSERT SPINE FIXATION DEVICE: CPT | Performed by: NEUROLOGICAL SURGERY

## 2021-06-21 PROCEDURE — 110454 HCHG SHELL REV 250: Performed by: NEUROLOGICAL SURGERY

## 2021-06-21 PROCEDURE — 700105 HCHG RX REV CODE 258: Performed by: PHYSICIAN ASSISTANT

## 2021-06-21 PROCEDURE — 501838 HCHG SUTURE GENERAL: Performed by: NEUROLOGICAL SURGERY

## 2021-06-21 PROCEDURE — 96376 TX/PRO/DX INJ SAME DRUG ADON: CPT

## 2021-06-21 PROCEDURE — 95938 SOMATOSENSORY TESTING: CPT | Performed by: NEUROLOGICAL SURGERY

## 2021-06-21 PROCEDURE — 500002 HCHG ADHESIVE, DERMABOND: Performed by: NEUROLOGICAL SURGERY

## 2021-06-21 PROCEDURE — 22853 INSJ BIOMECHANICAL DEVICE: CPT | Mod: ASROC | Performed by: PHYSICIAN ASSISTANT

## 2021-06-21 PROCEDURE — 160031 HCHG SURGERY MINUTES - 1ST 30 MINS LEVEL 5: Performed by: NEUROLOGICAL SURGERY

## 2021-06-21 PROCEDURE — 95870 NDL EMG LMTD STD MUSC 1 XTR: CPT | Performed by: NEUROLOGICAL SURGERY

## 2021-06-21 PROCEDURE — 20930 SP BONE ALGRFT MORSEL ADD-ON: CPT | Performed by: NEUROLOGICAL SURGERY

## 2021-06-21 PROCEDURE — 160048 HCHG OR STATISTICAL LEVEL 1-5: Performed by: NEUROLOGICAL SURGERY

## 2021-06-21 PROCEDURE — 95861 NEEDLE EMG 2 EXTREMITIES: CPT | Performed by: NEUROLOGICAL SURGERY

## 2021-06-21 PROCEDURE — 700101 HCHG RX REV CODE 250: Performed by: NEUROLOGICAL SURGERY

## 2021-06-21 PROCEDURE — G0378 HOSPITAL OBSERVATION PER HR: HCPCS

## 2021-06-21 PROCEDURE — 700102 HCHG RX REV CODE 250 W/ 637 OVERRIDE(OP): Performed by: PHYSICIAN ASSISTANT

## 2021-06-21 PROCEDURE — 700111 HCHG RX REV CODE 636 W/ 250 OVERRIDE (IP): Performed by: NEUROLOGICAL SURGERY

## 2021-06-21 PROCEDURE — A4306 DRUG DELIVERY SYSTEM <=50 ML: HCPCS | Performed by: NEUROLOGICAL SURGERY

## 2021-06-21 PROCEDURE — 160035 HCHG PACU - 1ST 60 MINS PHASE I: Performed by: NEUROLOGICAL SURGERY

## 2021-06-21 PROCEDURE — 22633 ARTHRD CMBN 1NTRSPC LUMBAR: CPT | Mod: ASROC | Performed by: PHYSICIAN ASSISTANT

## 2021-06-21 DEVICE — IMPLANTABLE DEVICE: Type: IMPLANTABLE DEVICE | Site: SPINE LUMBAR | Status: FUNCTIONAL

## 2021-06-21 DEVICE — CAGE EXPANDABLE ELEVATE EXRA LORDOTIC 7 X 28: Type: IMPLANTABLE DEVICE | Site: SPINE LUMBAR | Status: FUNCTIONAL

## 2021-06-21 DEVICE — MATRIX MASTERGRAFT 10CC: Type: IMPLANTABLE DEVICE | Site: SPINE LUMBAR | Status: FUNCTIONAL

## 2021-06-21 DEVICE — GRAPH BONE KIT INFUSE MEDIUM: Type: IMPLANTABLE DEVICE | Site: SPINE LUMBAR | Status: FUNCTIONAL

## 2021-06-21 DEVICE — SCREW MAS  SOLERA 6.5 X 50MM (1TCX16+3TCX8=40): Type: IMPLANTABLE DEVICE | Site: SPINE LUMBAR | Status: FUNCTIONAL

## 2021-06-21 RX ORDER — MORPHINE SULFATE 4 MG/ML
2 INJECTION, SOLUTION INTRAMUSCULAR; INTRAVENOUS ONCE
Status: ACTIVE | OUTPATIENT
Start: 2021-06-21 | End: 2021-06-22

## 2021-06-21 RX ORDER — MAGNESIUM HYDROXIDE 1200 MG/15ML
LIQUID ORAL
Status: COMPLETED | OUTPATIENT
Start: 2021-06-21 | End: 2021-06-21

## 2021-06-21 RX ORDER — ACETAMINOPHEN 500 MG
500 TABLET ORAL ONCE
Status: COMPLETED | OUTPATIENT
Start: 2021-06-21 | End: 2021-06-21

## 2021-06-21 RX ORDER — LEVOTHYROXINE SODIUM 0.07 MG/1
75 TABLET ORAL
Status: DISCONTINUED | OUTPATIENT
Start: 2021-06-22 | End: 2021-06-23 | Stop reason: HOSPADM

## 2021-06-21 RX ORDER — HALOPERIDOL 5 MG/ML
1 INJECTION INTRAMUSCULAR
Status: DISCONTINUED | OUTPATIENT
Start: 2021-06-21 | End: 2021-06-21 | Stop reason: HOSPADM

## 2021-06-21 RX ORDER — ENEMA 19; 7 G/133ML; G/133ML
1 ENEMA RECTAL
Status: DISCONTINUED | OUTPATIENT
Start: 2021-06-21 | End: 2021-06-23 | Stop reason: HOSPADM

## 2021-06-21 RX ORDER — DEXAMETHASONE SODIUM PHOSPHATE 4 MG/ML
INJECTION, SOLUTION INTRA-ARTICULAR; INTRALESIONAL; INTRAMUSCULAR; INTRAVENOUS; SOFT TISSUE PRN
Status: DISCONTINUED | OUTPATIENT
Start: 2021-06-21 | End: 2021-06-21 | Stop reason: SURG

## 2021-06-21 RX ORDER — SODIUM CHLORIDE, SODIUM LACTATE, POTASSIUM CHLORIDE, AND CALCIUM CHLORIDE .6; .31; .03; .02 G/100ML; G/100ML; G/100ML; G/100ML
IRRIGANT IRRIGATION
Status: DISCONTINUED | OUTPATIENT
Start: 2021-06-21 | End: 2021-06-21 | Stop reason: HOSPADM

## 2021-06-21 RX ORDER — ONDANSETRON 2 MG/ML
4 INJECTION INTRAMUSCULAR; INTRAVENOUS EVERY 4 HOURS PRN
Status: DISCONTINUED | OUTPATIENT
Start: 2021-06-21 | End: 2021-06-23 | Stop reason: HOSPADM

## 2021-06-21 RX ORDER — DIPHENHYDRAMINE HYDROCHLORIDE 50 MG/ML
25 INJECTION INTRAMUSCULAR; INTRAVENOUS EVERY 6 HOURS PRN
Status: DISCONTINUED | OUTPATIENT
Start: 2021-06-21 | End: 2021-06-23 | Stop reason: HOSPADM

## 2021-06-21 RX ORDER — VANCOMYCIN HYDROCHLORIDE 1 G/20ML
INJECTION, POWDER, LYOPHILIZED, FOR SOLUTION INTRAVENOUS
Status: COMPLETED | OUTPATIENT
Start: 2021-06-21 | End: 2021-06-21

## 2021-06-21 RX ORDER — MEPERIDINE HYDROCHLORIDE 25 MG/ML
6.25 INJECTION INTRAMUSCULAR; INTRAVENOUS; SUBCUTANEOUS
Status: DISCONTINUED | OUTPATIENT
Start: 2021-06-21 | End: 2021-06-21 | Stop reason: HOSPADM

## 2021-06-21 RX ORDER — LABETALOL HYDROCHLORIDE 5 MG/ML
10 INJECTION, SOLUTION INTRAVENOUS
Status: DISCONTINUED | OUTPATIENT
Start: 2021-06-21 | End: 2021-06-23 | Stop reason: HOSPADM

## 2021-06-21 RX ORDER — BISACODYL 10 MG
10 SUPPOSITORY, RECTAL RECTAL
Status: DISCONTINUED | OUTPATIENT
Start: 2021-06-21 | End: 2021-06-23 | Stop reason: HOSPADM

## 2021-06-21 RX ORDER — SODIUM CHLORIDE, SODIUM LACTATE, POTASSIUM CHLORIDE, CALCIUM CHLORIDE 600; 310; 30; 20 MG/100ML; MG/100ML; MG/100ML; MG/100ML
INJECTION, SOLUTION INTRAVENOUS CONTINUOUS
Status: ACTIVE | OUTPATIENT
Start: 2021-06-21 | End: 2021-06-21

## 2021-06-21 RX ORDER — PRAVASTATIN SODIUM 20 MG
40 TABLET ORAL DAILY
Status: DISCONTINUED | OUTPATIENT
Start: 2021-06-21 | End: 2021-06-23 | Stop reason: HOSPADM

## 2021-06-21 RX ORDER — HYDROMORPHONE HYDROCHLORIDE 1 MG/ML
0.4 INJECTION, SOLUTION INTRAMUSCULAR; INTRAVENOUS; SUBCUTANEOUS
Status: DISCONTINUED | OUTPATIENT
Start: 2021-06-21 | End: 2021-06-21 | Stop reason: HOSPADM

## 2021-06-21 RX ORDER — TRAZODONE HYDROCHLORIDE 50 MG/1
50 TABLET ORAL
Status: DISCONTINUED | OUTPATIENT
Start: 2021-06-21 | End: 2021-06-23 | Stop reason: HOSPADM

## 2021-06-21 RX ORDER — HYDROMORPHONE HYDROCHLORIDE 1 MG/ML
0.2 INJECTION, SOLUTION INTRAMUSCULAR; INTRAVENOUS; SUBCUTANEOUS
Status: DISCONTINUED | OUTPATIENT
Start: 2021-06-21 | End: 2021-06-21 | Stop reason: HOSPADM

## 2021-06-21 RX ORDER — AMOXICILLIN 250 MG
1 CAPSULE ORAL
Status: DISCONTINUED | OUTPATIENT
Start: 2021-06-21 | End: 2021-06-23 | Stop reason: HOSPADM

## 2021-06-21 RX ORDER — SODIUM CHLORIDE AND POTASSIUM CHLORIDE 150; 900 MG/100ML; MG/100ML
INJECTION, SOLUTION INTRAVENOUS CONTINUOUS
Status: DISPENSED | OUTPATIENT
Start: 2021-06-21 | End: 2021-06-22

## 2021-06-21 RX ORDER — CEFAZOLIN SODIUM 2 G/100ML
2 INJECTION, SOLUTION INTRAVENOUS EVERY 8 HOURS
Status: COMPLETED | OUTPATIENT
Start: 2021-06-21 | End: 2021-06-22

## 2021-06-21 RX ORDER — LABETALOL HYDROCHLORIDE 5 MG/ML
5 INJECTION, SOLUTION INTRAVENOUS
Status: DISCONTINUED | OUTPATIENT
Start: 2021-06-21 | End: 2021-06-21 | Stop reason: HOSPADM

## 2021-06-21 RX ORDER — ONDANSETRON 2 MG/ML
4 INJECTION INTRAMUSCULAR; INTRAVENOUS
Status: DISCONTINUED | OUTPATIENT
Start: 2021-06-21 | End: 2021-06-21 | Stop reason: HOSPADM

## 2021-06-21 RX ORDER — ACETAMINOPHEN 500 MG
1000 TABLET ORAL EVERY 6 HOURS
Status: DISCONTINUED | OUTPATIENT
Start: 2021-06-21 | End: 2021-06-23 | Stop reason: HOSPADM

## 2021-06-21 RX ORDER — ALPRAZOLAM 0.25 MG/1
0.25 TABLET ORAL 2 TIMES DAILY PRN
Status: DISCONTINUED | OUTPATIENT
Start: 2021-06-21 | End: 2021-06-23 | Stop reason: HOSPADM

## 2021-06-21 RX ORDER — HEPARIN SODIUM,PORCINE 1000/ML
VIAL (ML) INJECTION
Status: DISCONTINUED | OUTPATIENT
Start: 2021-06-21 | End: 2021-06-21 | Stop reason: HOSPADM

## 2021-06-21 RX ORDER — BUPIVACAINE HYDROCHLORIDE AND EPINEPHRINE 5; 5 MG/ML; UG/ML
INJECTION, SOLUTION EPIDURAL; INTRACAUDAL; PERINEURAL
Status: DISCONTINUED | OUTPATIENT
Start: 2021-06-21 | End: 2021-06-21 | Stop reason: HOSPADM

## 2021-06-21 RX ORDER — DOCUSATE SODIUM 100 MG/1
100 CAPSULE, LIQUID FILLED ORAL 2 TIMES DAILY
Status: DISCONTINUED | OUTPATIENT
Start: 2021-06-21 | End: 2021-06-23 | Stop reason: HOSPADM

## 2021-06-21 RX ORDER — DIPHENHYDRAMINE HCL 25 MG
25 TABLET ORAL EVERY 6 HOURS PRN
Status: DISCONTINUED | OUTPATIENT
Start: 2021-06-21 | End: 2021-06-23 | Stop reason: HOSPADM

## 2021-06-21 RX ORDER — OXYCODONE HCL 5 MG/5 ML
10 SOLUTION, ORAL ORAL
Status: COMPLETED | OUTPATIENT
Start: 2021-06-21 | End: 2021-06-21

## 2021-06-21 RX ORDER — PHENYLEPHRINE HCL IN 0.9% NACL 0.5 MG/5ML
SYRINGE (ML) INTRAVENOUS PRN
Status: DISCONTINUED | OUTPATIENT
Start: 2021-06-21 | End: 2021-06-21 | Stop reason: SURG

## 2021-06-21 RX ORDER — LIDOCAINE HYDROCHLORIDE 40 MG/ML
SOLUTION TOPICAL PRN
Status: DISCONTINUED | OUTPATIENT
Start: 2021-06-21 | End: 2021-06-21 | Stop reason: SURG

## 2021-06-21 RX ORDER — ONDANSETRON 2 MG/ML
INJECTION INTRAMUSCULAR; INTRAVENOUS PRN
Status: DISCONTINUED | OUTPATIENT
Start: 2021-06-21 | End: 2021-06-21 | Stop reason: SURG

## 2021-06-21 RX ORDER — GABAPENTIN 300 MG/1
300 CAPSULE ORAL 3 TIMES DAILY
Status: DISCONTINUED | OUTPATIENT
Start: 2021-06-21 | End: 2021-06-23 | Stop reason: HOSPADM

## 2021-06-21 RX ORDER — ACETAMINOPHEN 500 MG
1000 TABLET ORAL EVERY 6 HOURS PRN
Status: DISCONTINUED | OUTPATIENT
Start: 2021-06-26 | End: 2021-06-23 | Stop reason: HOSPADM

## 2021-06-21 RX ORDER — HYDROMORPHONE HYDROCHLORIDE 1 MG/ML
0.1 INJECTION, SOLUTION INTRAMUSCULAR; INTRAVENOUS; SUBCUTANEOUS
Status: DISCONTINUED | OUTPATIENT
Start: 2021-06-21 | End: 2021-06-21 | Stop reason: HOSPADM

## 2021-06-21 RX ORDER — TIZANIDINE 4 MG/1
2 TABLET ORAL 3 TIMES DAILY PRN
Status: DISCONTINUED | OUTPATIENT
Start: 2021-06-21 | End: 2021-06-23 | Stop reason: HOSPADM

## 2021-06-21 RX ORDER — CEFAZOLIN SODIUM 1 G/3ML
INJECTION, POWDER, FOR SOLUTION INTRAMUSCULAR; INTRAVENOUS
Status: DISCONTINUED | OUTPATIENT
Start: 2021-06-21 | End: 2021-06-21 | Stop reason: HOSPADM

## 2021-06-21 RX ORDER — MORPHINE SULFATE 4 MG/ML
2 INJECTION, SOLUTION INTRAMUSCULAR; INTRAVENOUS
Status: COMPLETED | OUTPATIENT
Start: 2021-06-21 | End: 2021-06-21

## 2021-06-21 RX ORDER — MIDAZOLAM HYDROCHLORIDE 1 MG/ML
INJECTION INTRAMUSCULAR; INTRAVENOUS PRN
Status: DISCONTINUED | OUTPATIENT
Start: 2021-06-21 | End: 2021-06-21 | Stop reason: SURG

## 2021-06-21 RX ORDER — POLYETHYLENE GLYCOL 3350 17 G/17G
1 POWDER, FOR SOLUTION ORAL 2 TIMES DAILY PRN
Status: DISCONTINUED | OUTPATIENT
Start: 2021-06-21 | End: 2021-06-23 | Stop reason: HOSPADM

## 2021-06-21 RX ORDER — OXYCODONE HCL 5 MG/5 ML
5 SOLUTION, ORAL ORAL
Status: COMPLETED | OUTPATIENT
Start: 2021-06-21 | End: 2021-06-21

## 2021-06-21 RX ORDER — METFORMIN HYDROCHLORIDE 500 MG/1
500 TABLET, EXTENDED RELEASE ORAL
Status: DISCONTINUED | OUTPATIENT
Start: 2021-06-21 | End: 2021-06-23 | Stop reason: HOSPADM

## 2021-06-21 RX ORDER — ONDANSETRON 4 MG/1
4 TABLET, ORALLY DISINTEGRATING ORAL EVERY 4 HOURS PRN
Status: DISCONTINUED | OUTPATIENT
Start: 2021-06-21 | End: 2021-06-23 | Stop reason: HOSPADM

## 2021-06-21 RX ORDER — DIPHENHYDRAMINE HYDROCHLORIDE 50 MG/ML
12.5 INJECTION INTRAMUSCULAR; INTRAVENOUS
Status: DISCONTINUED | OUTPATIENT
Start: 2021-06-21 | End: 2021-06-21 | Stop reason: HOSPADM

## 2021-06-21 RX ORDER — AMOXICILLIN 250 MG
1 CAPSULE ORAL NIGHTLY
Status: DISCONTINUED | OUTPATIENT
Start: 2021-06-21 | End: 2021-06-23 | Stop reason: HOSPADM

## 2021-06-21 RX ORDER — CEFAZOLIN SODIUM 1 G/3ML
INJECTION, POWDER, FOR SOLUTION INTRAMUSCULAR; INTRAVENOUS PRN
Status: DISCONTINUED | OUTPATIENT
Start: 2021-06-21 | End: 2021-06-21 | Stop reason: SURG

## 2021-06-21 RX ORDER — ROCURONIUM BROMIDE 10 MG/ML
INJECTION, SOLUTION INTRAVENOUS PRN
Status: DISCONTINUED | OUTPATIENT
Start: 2021-06-21 | End: 2021-06-21 | Stop reason: SURG

## 2021-06-21 RX ORDER — HYDRALAZINE HYDROCHLORIDE 20 MG/ML
5 INJECTION INTRAMUSCULAR; INTRAVENOUS
Status: DISCONTINUED | OUTPATIENT
Start: 2021-06-21 | End: 2021-06-21 | Stop reason: HOSPADM

## 2021-06-21 RX ORDER — SODIUM CHLORIDE, SODIUM LACTATE, POTASSIUM CHLORIDE, CALCIUM CHLORIDE 600; 310; 30; 20 MG/100ML; MG/100ML; MG/100ML; MG/100ML
INJECTION, SOLUTION INTRAVENOUS CONTINUOUS
Status: DISCONTINUED | OUTPATIENT
Start: 2021-06-21 | End: 2021-06-21 | Stop reason: HOSPADM

## 2021-06-21 RX ADMIN — POVIDONE IODINE 15 ML: 100 SOLUTION TOPICAL at 08:44

## 2021-06-21 RX ADMIN — FENTANYL CITRATE 50 MCG: 50 INJECTION, SOLUTION INTRAMUSCULAR; INTRAVENOUS at 14:23

## 2021-06-21 RX ADMIN — Medication 100 MCG: at 11:51

## 2021-06-21 RX ADMIN — ACETAMINOPHEN 1000 MG: 500 TABLET ORAL at 19:30

## 2021-06-21 RX ADMIN — ONDANSETRON 4 MG: 2 INJECTION INTRAMUSCULAR; INTRAVENOUS at 12:41

## 2021-06-21 RX ADMIN — Medication 100 MCG: at 12:25

## 2021-06-21 RX ADMIN — FENTANYL CITRATE 50 MCG: 50 INJECTION, SOLUTION INTRAMUSCULAR; INTRAVENOUS at 12:50

## 2021-06-21 RX ADMIN — LIDOCAINE HYDROCHLORIDE 4 ML: 40 SOLUTION TOPICAL at 10:48

## 2021-06-21 RX ADMIN — METFORMIN HYDROCHLORIDE 500 MG: 500 TABLET, EXTENDED RELEASE ORAL at 19:31

## 2021-06-21 RX ADMIN — SODIUM CHLORIDE, POTASSIUM CHLORIDE, SODIUM LACTATE AND CALCIUM CHLORIDE: 600; 310; 30; 20 INJECTION, SOLUTION INTRAVENOUS at 08:45

## 2021-06-21 RX ADMIN — DEXAMETHASONE SODIUM PHOSPHATE 4 MG: 4 INJECTION, SOLUTION INTRA-ARTICULAR; INTRALESIONAL; INTRAMUSCULAR; INTRAVENOUS; SOFT TISSUE at 10:48

## 2021-06-21 RX ADMIN — MIDAZOLAM HYDROCHLORIDE 2 MG: 1 INJECTION, SOLUTION INTRAMUSCULAR; INTRAVENOUS at 10:45

## 2021-06-21 RX ADMIN — Medication 100 MCG: at 11:02

## 2021-06-21 RX ADMIN — Medication 100 MCG: at 11:35

## 2021-06-21 RX ADMIN — ROCURONIUM BROMIDE 50 MG: 10 INJECTION, SOLUTION INTRAVENOUS at 10:48

## 2021-06-21 RX ADMIN — POTASSIUM CHLORIDE AND SODIUM CHLORIDE: 900; 150 INJECTION, SOLUTION INTRAVENOUS at 19:23

## 2021-06-21 RX ADMIN — ACETAMINOPHEN 500 MG: 500 TABLET ORAL at 08:44

## 2021-06-21 RX ADMIN — Medication 100 MCG: at 10:48

## 2021-06-21 RX ADMIN — PROPOFOL 120 MG: 10 INJECTION, EMULSION INTRAVENOUS at 10:48

## 2021-06-21 RX ADMIN — MORPHINE SULFATE 2 MG: 4 INJECTION INTRAVENOUS at 16:49

## 2021-06-21 RX ADMIN — Medication 100 MCG: at 12:03

## 2021-06-21 RX ADMIN — FENTANYL CITRATE 50 MCG: 50 INJECTION, SOLUTION INTRAMUSCULAR; INTRAVENOUS at 13:14

## 2021-06-21 RX ADMIN — SUGAMMADEX 150 MG: 100 INJECTION, SOLUTION INTRAVENOUS at 12:58

## 2021-06-21 RX ADMIN — FENTANYL CITRATE 50 MCG: 50 INJECTION, SOLUTION INTRAMUSCULAR; INTRAVENOUS at 10:45

## 2021-06-21 RX ADMIN — Medication 100 MCG: at 13:24

## 2021-06-21 RX ADMIN — DOCUSATE SODIUM 50 MG AND SENNOSIDES 8.6 MG 1 TABLET: 8.6; 5 TABLET, FILM COATED ORAL at 20:35

## 2021-06-21 RX ADMIN — GABAPENTIN 300 MG: 300 CAPSULE ORAL at 19:31

## 2021-06-21 RX ADMIN — MORPHINE SULFATE: 50 INJECTION, SOLUTION, CONCENTRATE INTRAVENOUS at 19:27

## 2021-06-21 RX ADMIN — FENTANYL CITRATE 50 MCG: 50 INJECTION, SOLUTION INTRAMUSCULAR; INTRAVENOUS at 11:08

## 2021-06-21 RX ADMIN — TRAZODONE HYDROCHLORIDE 50 MG: 50 TABLET ORAL at 20:35

## 2021-06-21 RX ADMIN — SODIUM CHLORIDE, POTASSIUM CHLORIDE, SODIUM LACTATE AND CALCIUM CHLORIDE: 600; 310; 30; 20 INJECTION, SOLUTION INTRAVENOUS at 12:05

## 2021-06-21 RX ADMIN — OXYCODONE HYDROCHLORIDE 10 MG: 5 SOLUTION ORAL at 13:44

## 2021-06-21 RX ADMIN — CEFAZOLIN 2 G: 330 INJECTION, POWDER, FOR SOLUTION INTRAMUSCULAR; INTRAVENOUS at 10:55

## 2021-06-21 RX ADMIN — FENTANYL CITRATE 50 MCG: 50 INJECTION, SOLUTION INTRAMUSCULAR; INTRAVENOUS at 13:45

## 2021-06-21 RX ADMIN — CEFAZOLIN SODIUM 2 G: 2 INJECTION, SOLUTION INTRAVENOUS at 19:31

## 2021-06-21 RX ADMIN — ACETAMINOPHEN 1000 MG: 500 TABLET ORAL at 23:54

## 2021-06-21 RX ADMIN — DOCUSATE SODIUM 100 MG: 100 CAPSULE, LIQUID FILLED ORAL at 19:30

## 2021-06-21 ASSESSMENT — PAIN DESCRIPTION - PAIN TYPE
TYPE: SURGICAL PAIN
TYPE: CHRONIC PAIN
TYPE: SURGICAL PAIN
TYPE: SURGICAL PAIN

## 2021-06-21 ASSESSMENT — FIBROSIS 4 INDEX: FIB4 SCORE: 1.52

## 2021-06-21 NOTE — PROGRESS NOTES
Med rec updated and complete  Allergies reviewed  Interviewed pt with son at bedside with permission from pt.  Pt reports no antibiotics in the last 2 weeks

## 2021-06-21 NOTE — OR SURGEON
Immediate Post OP Note    PreOp Diagnosis: Grade 2/3 L5-S1 isthmic spondylolisthesis      PostOp Diagnosis: As above    Procedure(s):  FUSION, SPINE, LUMBAR, WITH O-ARM IMAGING GUIDANCE - L5-S1. - Wound Class: Clean with Drain    Surgeon(s):  Germaine Bullard M.D.    Anesthesiologist/Type of Anesthesia:  Anesthesiologist: Adán Venegas M.D./General    Surgical Staff:  Cell Saver : Ned Estevez  Circulator: Gina Nickerson R.N.; Lukas D. Gansert, R.N.  Relief Scrub: Rachelle Marie  Scrub Person: Tila Broussard Assist: Patrick Hooker P.A.-C.  Radiology Technologist: Roxanne Horne    Specimens removed if any:  * No specimens in log *    Assistants: Patrick Hooker PA-C  ,  Specimen: nil    Estimated Blood Loss: 200 cc    Findings: n/a    Complications: nil      6/21/2021 1:19 PM Germaine Bullard M.D.

## 2021-06-21 NOTE — OP REPORT
1. DATE OF SURGERY: 6/21/2021    2. SURGEON:  Germaine Bullard MD, PhD, [facility]IA, Neurosurgeon    3. ASSISTANT:  /Patrick Hooker PA-C  An assistant was crucial to have during the case as the assistant helped with positioning, keeping the field clear of blood and retraction. This case could not be done easily without a qualified assistant. It was medically necessary      4. TYPE OF ANESTHESIA:  General anesthesia with endotracheal intubation    5. PREOPERATIVE DIAGNOSIS:      Grade 2/3 L5-S1 isthmic spondylolisthesis with leg pain and failed conservative therapy    6. POSTOPERATIVE DIAGNOSIS: as above    7. HISTORY:     This very nice 66 woman history of right posterior thigh pain.  Its been there for a few years.  She gets buttock and right leg pain.  It gets worse when she walks.  She failed all conservative measures.  We offered a surgery.  She had a grade 2/3 isthmic spondylolisthesis with severe stenosis.  Preoperatively she had a reduced range of motion lumbar spine.    8.  PREOPERATIVE PHYSICAL EXAMINATION: See formal admission H and P    9. TITLE OF THE PROCEDURE:    •  L5-S1 decompressive laminectomy bilateral foraminotomies, and complete L5-S1 facetectomies (Diaz procedure at L5 and a decompressive laminectomy at S1) (laminectomies for neural decompression, not just for implant placement)  • Bilateral Left transpedicular decompression of the common thecal sac and exiting L5 nerve roots.  • Left L5-S1 and right L5-S1 microscopic microdiscectomies  • Bilateral Anjelica osteotomies at L5-S1 to facilitate reduction of the   • spondylolisthesis.  • Placement of a elevate PEEK interbody cage  • L5-S1 Interbody fusion using local morcellized autograft  • L5-S1 segmental fixation with Solera  Astute PEEK rods  • L5-S1 Posterolateral fusion using local morcellized autograft, BMP and matrix master graft  • O- Arm navigation for screw placement  • Open reduction of L5-S1 spondylolisthesis  • i/o On-Q Pain catheters in paraspinal  musculature      10. OPERATIVE FINDINGS: She had a grade 2/3 L5-S1 isthmic spondylolisthesis.  There is severe stenosis both in the lateral recesses due to pseudoarthrosis at the PARS and also severe bilateral foraminal stenosis.  She was well decompressed.  I perform a decompression before I could place the implants.  She had stenosis on top of the spondylolisthesis.    The degree of compression was  significant. The bone work required was way beyond that which was required for implant placement and I had to perform decompressions as a separate procedure to the bone work required for implant placement. The stenosis was bilateral and at the levels described and needed to be addressed. Additional bone work was required for placement of the cage but this was distinctly different to the decompressions required to address stenosis.     11. OPERATED LEVELS: L5/S1    12. IMPLANTS USED:  Medtronic solera Astute screws  Solera  Astute PEEK rods  Medtronic Elevate expandable interbody cage  Matrix master graft and infuse BMP      13. COMPLICATIONS:  Nil.    14. ESTIMATED BLOOD LOSS:    200    mL    15. OPERATIVE DETAILS:    After fully informed consent, the patient was brought to the operating room.  General anesthesia was administered.  The patient was given intravenous antibiotic.  A Robbins catheter was placed.  The patient was carefully turned prone on the OSI operating table in the Cesar frame.  All pressure points were padded.      The posterior lumbar region was prepped and draped in a standard fashion.  A skin incision was marked under fluoroscopic guidance.  After the skin incision was made, bilateral subperiosteal exposure was affected at the effected levels.  I continued the dissection out to the transverse processes of L5 and the sacral alar, which were decorticated for later bone grafting.  I then placed infuse BMP and matrix master graft in posterolateral gutters. I placed morcellized autograft from the  posterior lateral gutters at the end of the case as well. Great care was taken not to violate the pedicle, the facet joint capsules above and below.       Attention then turned to placement of pedicle screws.  The O arm was brought in. The right iliac spike was placed. This pin was affected. Using navigation and cannulated the L5 and S1 pedicles. In each case the the pedicles were cannulated, palpated, stimulated, tapped and then appropriate size screws were placed. 6.5 mm screws of appropriate length 50 mm were placed in L5 with 6.5 mm by 50 mm bicortical screws using the Legacy pedicle screw system were placed in S1. All the screws were stimulated and AP lateral x-ray was taken to confirm placement. Screw placed was challenging because of the angle of the slippage.    Initially, I performed a laminectomy using Leksell rongeurs and then an AM-8 drill bit to thin down the lamina, then a combination of 4, 3, and 2 mm Kerrison punches.  The nerve roots on either side were followed out and completely decompressed.  A bilateral transpedicular decompression of the exiting L5 nerve root was effected. Complete facetectomies at L5-S1 were effected. The final decompression was done of the microscope. The facet joints at L5-S1 were completely taken down osteotomies affected. Foraminotomies were effected at d L5-S1, with the superior half of S1 lamina removed.  I followed back the L5 nerve roots to the pseudoarthrosis at the PARS defect and any compressive material was taken away I then followed out the L5 and S1 nerve roots and made sure they were decompressed.With gentle medial retraction of the S1 nerve root disc material was incised and removed in a piecemeal fashion.  This was done on the left.s repeated on the right.     A Anjelica osteotomy been affected in order to take off all the bone from pedicle to pedicle between L5 and S1 to facilitate the reduction.    With gentle medial retraction of the traversing nerve root on  the right side, the disk space was incised. I also did this on the right side to facilitate distraction. This was because the degree of slippage.  I then entered the disk space at an oblique 30-degree angle and using fluoroscopic guidance to ensure that I did violate the anterior disk space, a complete discectomy was affected.  Great care was taken not to violate the annulus.  The disk material was removed with various spreaders, tommy and gouges.  The endplates were then decorticated.  I then selected the various trials and at a 30-degree angle with gentle medial retraction of the traversing nerve root and protecting the exiting nerve root, I selected the height and length of the cage that I would place.      Further bone graft was placed anteriorly into the interspace, into the cage as well as posteriorly.  The elevate peek/titanium expandable 7 x 11 mm x 28 mm cage was then impacted into place. After this, expansion was affected.  Hemostasis obtained.  More bone graft was placed behind the cage to ensure that the cage was in appropriate position.      Hemostasis obtained.  At the end of the cage placement, all the nerve roots were free.  AP and lateral fluoroscopy confirmed satisfactory placement of the cage.  I then went on to complete the rest of the procedure. Appropriately sized rods were then secured with locking caps to the screws.  All the instrumentation was tightened. I checked the foramina one last time and these were all free. Hemostasis obtained. Final x-ray showed good placement of the instrumentation satisfactory reduction of the spinal listhesis.    An open reduction of the spondylolisthesis was affected.      Final AP and lateral x-ray was taken.  Meticulous hemostasis was obtained.  Vancomycin powder was placed throughout the wound.  Two suction subfascial Hemovacs were placed.  Two paraspinal On-Q Pain catheters were then placed using direct visualization to ensure they stayed in the  paraspinal musculature.  Dermabond was placed over the puncture sites for the On-Q Pain catheters.  The drains were secured using 2-0 silk.  The wound was then closed in multiple layers using 0 Vicryl for the fascia, then 2-0 Vicryl for the subdermis, and then staples for skin.  A dressing was applied.  All counts were correct and all instruments were accounted for.  Hemostasis obtained.     All counts were correct and all instruments accounted for.    16. PROGNOSIS:    The surgery went well.  The patient was moving both lower extremities well at the end of the case.  We will see how things progress.  I would anticipate discharge in 24 to 72 hours unless there are issues that require rehabilitation and effecting mobility.  The patient will be asked to abstain from smoking and anti-inflammatories for 3 months.  A brace will be provided and this needs to be worn every time out of bed.  The patient has also been instructed that they need to avoid any bending, lifting, or twisting, and stay away from anti-inflammatories.  The patient was instructed not to shower for the next 72 hours.      Germaine Bullard MD, PhD, [facility]IA

## 2021-06-21 NOTE — OR NURSING
Patient A+Ox4. States pain now tolerable.  No nausea.  Tolerating po well.  Lower back incision clean and dry.  On q pump in place. Patient does have kraft to gravity with adequate pale yellow urine output.  Plan for patient to transfer to [facility] when cleaned.  Family updated with patient status and plan.  Belongings brought to bedside and patient states these are her belongings

## 2021-06-21 NOTE — ANESTHESIA TIME REPORT
Anesthesia Start and Stop Event Times     Date Time Event    6/21/2021 1016 Ready for Procedure     1043 Anesthesia Start     1325 Anesthesia Stop        Responsible Staff  06/21/21    Name Role Begin End    Adán Venegas M.D. Anesth 1043 1325        Preop Diagnosis (Free Text):  Pre-op Diagnosis     SPINAL STENOSIS OF LUMBAR REGION        Preop Diagnosis (Codes):    Post op Diagnosis  Lumbar stenosis      Premium Reason  Non-Premium    Comments:

## 2021-06-21 NOTE — ANESTHESIA POSTPROCEDURE EVALUATION
Patient: Che Tovar    Procedure Summary     Date: 06/21/21 Room / Location: [place] [address] / SURGERY [facility]    Anesthesia Start: 1043 Anesthesia Stop: 1325    Procedure: FUSION, SPINE, LUMBAR, WITH O-ARM IMAGING GUIDANCE - L5-S1. (Spine Lumbar) Diagnosis: (SPINAL STENOSIS OF LUMBAR REGION)    Surgeons: Germaine Bullard M.D. Responsible Provider: Adán Venegas M.D.    Anesthesia Type: general ASA Status: 2          Final Anesthesia Type: general  Last vitals  BP   Blood Pressure : 128/57    Temp   36.4 °C (97.6 °F)    Pulse   64   Resp   15    SpO2   100 %      Anesthesia Post Evaluation    Patient location during evaluation: PACU  Patient participation: complete - patient participated  Level of consciousness: awake and alert    Airway patency: patent  Anesthetic complications: no  Cardiovascular status: hemodynamically stable  Respiratory status: acceptable  Hydration status: euvolemic    PONV: none          No complications documented.     Nurse Pain Score: 7 (NPRS)

## 2021-06-21 NOTE — ANESTHESIA PREPROCEDURE EVALUATION
Lumbar spinal stenosis    Relevant Problems   NEURO   (positive) History of gout      CARDIAC   (positive) Essential hypertension   (positive) HTN (hypertension)      ENDO   (positive) Acquired hypothyroidism   (positive) Hypothyroidism   (positive) Type 2 diabetes mellitus without complication (HCC)   (positive) Type 2 diabetes mellitus without retinopathy (HCC)      Other   (positive) ELIZABETH (generalized anxiety disorder)   (positive) Pure hypercholesterolemia   (positive) S/P gastric bypass       Physical Exam    Airway   Mallampati: II  TM distance: >3 FB  Neck ROM: full       Cardiovascular - normal exam  Rhythm: regular  Rate: normal  (-) murmur     Dental - normal exam           Pulmonary - normal exam  Breath sounds clear to auscultation     Abdominal    Neurological - normal exam                 Anesthesia Plan    ASA 2       Plan - general       Airway plan will be ETT          Induction: intravenous    Postoperative Plan: Postoperative administration of opioids is intended.    Pertinent diagnostic labs and testing reviewed    Informed Consent:    Anesthetic plan and risks discussed with patient.    Use of blood products discussed with: patient whom consented to blood products.

## 2021-06-21 NOTE — CARE PLAN
The patient is Stable - Low risk of patient condition declining or worsening         Progress made toward(s) clinical / shift goals:  Went over POC.    Patient is not progressing towards the following goals:

## 2021-06-21 NOTE — ANESTHESIA PROCEDURE NOTES
Airway    Date/Time: 6/21/2021 10:49 AM  Performed by: Adán Venegas M.D.  Authorized by: Adán Venegas M.D.     Location:  OR  Urgency:  Elective  Difficult Airway: No    Indications for Airway Management:  Anesthesia      Spontaneous Ventilation: absent    Sedation Level:  Deep  Preoxygenated: Yes    Patient Position:  Sniffing  Mask Difficulty Assessment:  2 - vent by mask + OA or adjuvant +/- NMBA  Final Airway Type:  Endotracheal airway  Final Endotracheal Airway:  ETT  Cuffed: Yes    Technique Used for Successful ETT Placement:  Direct laryngoscopy    Insertion Site:  Oral  Blade Type:  Kathryn  Laryngoscope Blade/Videolaryngoscope Blade Size:  3  ETT Size (mm):  7.0  Measured from:  Teeth  ETT to Teeth (cm):  20  Placement Verified by: auscultation and capnometry    Cormack-Lehane Classification:  Grade I - full view of glottis  Number of Attempts at Approach:  1   Atraumatic DLx1

## 2021-06-22 LAB
ANION GAP SERPL CALC-SCNC: 7 MMOL/L (ref 7–16)
APTT PPP: 25.6 SEC (ref 24.7–36)
BUN SERPL-MCNC: 10 MG/DL (ref 8–22)
CALCIUM SERPL-MCNC: 8.5 MG/DL (ref 8.5–10.5)
CHLORIDE SERPL-SCNC: 101 MMOL/L (ref 96–112)
CO2 SERPL-SCNC: 24 MMOL/L (ref 20–33)
CREAT SERPL-MCNC: 0.64 MG/DL (ref 0.5–1.4)
ERYTHROCYTE [DISTWIDTH] IN BLOOD BY AUTOMATED COUNT: 46.2 FL (ref 35.9–50)
GLUCOSE BLD-MCNC: 160 MG/DL (ref 65–99)
GLUCOSE SERPL-MCNC: 117 MG/DL (ref 65–99)
HCT VFR BLD AUTO: 33.3 % (ref 37–47)
HGB BLD-MCNC: 10.7 G/DL (ref 12–16)
INR PPP: 1.05 (ref 0.87–1.13)
MCH RBC QN AUTO: 30.5 PG (ref 27–33)
MCHC RBC AUTO-ENTMCNC: 32.1 G/DL (ref 33.6–35)
MCV RBC AUTO: 94.9 FL (ref 81.4–97.8)
PLATELET # BLD AUTO: 210 K/UL (ref 164–446)
PMV BLD AUTO: 11.4 FL (ref 9–12.9)
POTASSIUM SERPL-SCNC: 4.4 MMOL/L (ref 3.6–5.5)
PROTHROMBIN TIME: 13.4 SEC (ref 12–14.6)
RBC # BLD AUTO: 3.51 M/UL (ref 4.2–5.4)
SODIUM SERPL-SCNC: 132 MMOL/L (ref 135–145)
WBC # BLD AUTO: 6.6 K/UL (ref 4.8–10.8)

## 2021-06-22 PROCEDURE — 700102 HCHG RX REV CODE 250 W/ 637 OVERRIDE(OP): Performed by: PHYSICIAN ASSISTANT

## 2021-06-22 PROCEDURE — 97165 OT EVAL LOW COMPLEX 30 MIN: CPT

## 2021-06-22 PROCEDURE — 85730 THROMBOPLASTIN TIME PARTIAL: CPT

## 2021-06-22 PROCEDURE — 80048 BASIC METABOLIC PNL TOTAL CA: CPT

## 2021-06-22 PROCEDURE — 85027 COMPLETE CBC AUTOMATED: CPT

## 2021-06-22 PROCEDURE — 770006 HCHG ROOM/CARE - MED/SURG/GYN SEMI*

## 2021-06-22 PROCEDURE — 700101 HCHG RX REV CODE 250: Performed by: PHYSICIAN ASSISTANT

## 2021-06-22 PROCEDURE — 700111 HCHG RX REV CODE 636 W/ 250 OVERRIDE (IP): Performed by: PHYSICIAN ASSISTANT

## 2021-06-22 PROCEDURE — 96376 TX/PRO/DX INJ SAME DRUG ADON: CPT

## 2021-06-22 PROCEDURE — 36415 COLL VENOUS BLD VENIPUNCTURE: CPT

## 2021-06-22 PROCEDURE — 97161 PT EVAL LOW COMPLEX 20 MIN: CPT

## 2021-06-22 PROCEDURE — A9270 NON-COVERED ITEM OR SERVICE: HCPCS | Performed by: PHYSICIAN ASSISTANT

## 2021-06-22 PROCEDURE — 85610 PROTHROMBIN TIME: CPT

## 2021-06-22 RX ORDER — OXYCODONE HYDROCHLORIDE 5 MG/1
5 TABLET ORAL EVERY 4 HOURS PRN
Status: DISCONTINUED | OUTPATIENT
Start: 2021-06-22 | End: 2021-06-23

## 2021-06-22 RX ORDER — OXYCODONE HYDROCHLORIDE 5 MG/1
2.5 TABLET ORAL EVERY 4 HOURS PRN
Status: DISCONTINUED | OUTPATIENT
Start: 2021-06-22 | End: 2021-06-23

## 2021-06-22 RX ADMIN — TIZANIDINE 2 MG: 4 TABLET ORAL at 17:49

## 2021-06-22 RX ADMIN — ACETAMINOPHEN 1000 MG: 500 TABLET ORAL at 23:34

## 2021-06-22 RX ADMIN — OXYCODONE 2.5 MG: 5 TABLET ORAL at 10:43

## 2021-06-22 RX ADMIN — TRAZODONE HYDROCHLORIDE 50 MG: 50 TABLET ORAL at 20:38

## 2021-06-22 RX ADMIN — POTASSIUM CHLORIDE AND SODIUM CHLORIDE: 900; 150 INJECTION, SOLUTION INTRAVENOUS at 05:31

## 2021-06-22 RX ADMIN — GABAPENTIN 300 MG: 300 CAPSULE ORAL at 12:26

## 2021-06-22 RX ADMIN — OXYCODONE 5 MG: 5 TABLET ORAL at 23:33

## 2021-06-22 RX ADMIN — ONDANSETRON 4 MG: 4 TABLET, ORALLY DISINTEGRATING ORAL at 23:33

## 2021-06-22 RX ADMIN — METFORMIN HYDROCHLORIDE 500 MG: 500 TABLET, EXTENDED RELEASE ORAL at 08:30

## 2021-06-22 RX ADMIN — DOCUSATE SODIUM 100 MG: 100 CAPSULE, LIQUID FILLED ORAL at 17:49

## 2021-06-22 RX ADMIN — PRAVASTATIN SODIUM 40 MG: 20 TABLET ORAL at 05:31

## 2021-06-22 RX ADMIN — GABAPENTIN 300 MG: 300 CAPSULE ORAL at 17:49

## 2021-06-22 RX ADMIN — ACETAMINOPHEN 1000 MG: 500 TABLET ORAL at 17:49

## 2021-06-22 RX ADMIN — OXYCODONE 5 MG: 5 TABLET ORAL at 19:13

## 2021-06-22 RX ADMIN — OXYCODONE 5 MG: 5 TABLET ORAL at 14:59

## 2021-06-22 RX ADMIN — POTASSIUM CHLORIDE AND SODIUM CHLORIDE: 900; 150 INJECTION, SOLUTION INTRAVENOUS at 14:09

## 2021-06-22 RX ADMIN — LEVOTHYROXINE SODIUM 75 MCG: 0.07 TABLET ORAL at 05:32

## 2021-06-22 RX ADMIN — METFORMIN HYDROCHLORIDE 500 MG: 500 TABLET, EXTENDED RELEASE ORAL at 10:43

## 2021-06-22 RX ADMIN — DOCUSATE SODIUM 100 MG: 100 CAPSULE, LIQUID FILLED ORAL at 05:31

## 2021-06-22 RX ADMIN — GABAPENTIN 300 MG: 300 CAPSULE ORAL at 05:31

## 2021-06-22 RX ADMIN — METFORMIN HYDROCHLORIDE 500 MG: 500 TABLET, EXTENDED RELEASE ORAL at 17:49

## 2021-06-22 RX ADMIN — CEFAZOLIN SODIUM 2 G: 2 INJECTION, SOLUTION INTRAVENOUS at 02:33

## 2021-06-22 RX ADMIN — ACETAMINOPHEN 1000 MG: 500 TABLET ORAL at 05:31

## 2021-06-22 RX ADMIN — TIZANIDINE 2 MG: 4 TABLET ORAL at 13:40

## 2021-06-22 RX ADMIN — DOCUSATE SODIUM 50 MG AND SENNOSIDES 8.6 MG 1 TABLET: 8.6; 5 TABLET, FILM COATED ORAL at 20:38

## 2021-06-22 RX ADMIN — ACETAMINOPHEN 1000 MG: 500 TABLET ORAL at 12:26

## 2021-06-22 ASSESSMENT — COGNITIVE AND FUNCTIONAL STATUS - GENERAL
MOBILITY SCORE: 20
TURNING FROM BACK TO SIDE WHILE IN FLAT BAD: A LITTLE
DAILY ACTIVITIY SCORE: 24
SUGGESTED CMS G CODE MODIFIER MOBILITY: CJ
CLIMB 3 TO 5 STEPS WITH RAILING: A LITTLE
SUGGESTED CMS G CODE MODIFIER DAILY ACTIVITY: CH
MOVING TO AND FROM BED TO CHAIR: A LITTLE
MOVING FROM LYING ON BACK TO SITTING ON SIDE OF FLAT BED: A LITTLE

## 2021-06-22 ASSESSMENT — PAIN DESCRIPTION - PAIN TYPE
TYPE: SURGICAL PAIN

## 2021-06-22 ASSESSMENT — PATIENT HEALTH QUESTIONNAIRE - PHQ9
2. FEELING DOWN, DEPRESSED, IRRITABLE, OR HOPELESS: NOT AT ALL
SUM OF ALL RESPONSES TO PHQ9 QUESTIONS 1 AND 2: 0
1. LITTLE INTEREST OR PLEASURE IN DOING THINGS: NOT AT ALL

## 2021-06-22 ASSESSMENT — GAIT ASSESSMENTS
GAIT LEVEL OF ASSIST: SUPERVISED
DISTANCE (FEET): 150

## 2021-06-22 ASSESSMENT — ACTIVITIES OF DAILY LIVING (ADL): TOILETING: INDEPENDENT

## 2021-06-22 NOTE — CARE PLAN
Problem: Pain - Standard  Goal: Alleviation of pain or a reduction in pain to the patient’s comfort goal  Outcome: Progressing     Problem: Fall Risk  Goal: Patient will remain free from falls  Outcome: Progressing   The patient is Stable - Low risk of patient condition declining or worsening    Shift Goals  Clinical Goals: stop PCA, remove kraft, ambulate, pain control  Patient Goals: pain contorl, remove kraft, ambulate.     Progress made toward(s) clinical / shift goals:  kraft removed, PCA stopped, patient taking oral pain medications, voiding adequately at this time     Patient is not progressing towards the following goals:

## 2021-06-22 NOTE — CARE PLAN
The patient is stable         Progress made toward(s) clinical / shift goals:  pain assessment q 2 hours, medicated as needed, comfort measures provided, on PCA morphine as ordered.    Patient is not progressing towards the following goals:

## 2021-06-22 NOTE — PROGRESS NOTES
SBAR report received, patient in bed awake.  in place, morphine PCA verified rate with off going RN. Patient comfortable. Robbins in place at this time. Patient denies needs.

## 2021-06-22 NOTE — PROGRESS NOTES
Neurosurgery  POD# 1  Seen with Dr. Bullard  Ambulating  Robbins in.  Tolerating oral medications  Denies nausea, vomiting, headache  Pain controlled on current medication regimen  Leg symptoms improved    Objective:  BP (!) 92/55   Pulse 76   Temp 36.9 °C (98.5 °F) (Temporal)   Resp 17   Ht 1.524 m (5')   Wt 56.8 kg (125 lb 3.5 oz)   SpO2 99%     Intake/Output Summary (Last 24 hours) at 6/22/2021 0814  Last data filed at 6/22/2021 0400  Gross per 24 hour   Intake 1934 ml   Output 3395 ml   Net -1461 ml       Recent Labs     06/22/21  0444   WBC 6.6   RBC 3.51*   HEMOGLOBIN 10.7*   HEMATOCRIT 33.3*   MCV 94.9   MCH 30.5   MCHC 32.1*   RDW 46.2   PLATELETCT 210   MPV 11.4     Recent Labs     06/22/21  0444   SODIUM 132*   POTASSIUM 4.4   CHLORIDE 101   CO2 24   GLUCOSE 117*   BUN 10     Recent Labs     06/22/21  0444   APTT 25.6   INR 1.05     Mildly hypotensive overnight, otherwise VSS  LS  Hemovac 80 and 10cc/8hr am  Surgical incision clean, dry, intact, no evidence of infection  Strength:  Lower extremities are 5/5 grossly  Otherwise neurologically intact    Assessment:  There are no active hospital problems to display for this patient.    POD#1 S/p L5-S1 TLIF    Plan:  1. Ambulate with PT/OT as tolerated - LSO when OOB and active  2. Advance diet as tolerated  3. D/c PCA, Advance orals. D/c IV fluids this evening  4. Continue hemovac to suction, on-q open  5. Aim for home Wednesday.

## 2021-06-22 NOTE — THERAPY
Occupational Therapy   Initial Evaluation     Patient Name: Che Tovar  Age:  66 y.o., Sex:  female  Medical Record #: [id number]  Today's Date: 6/22/2021     Precautions  Precautions: Spinal / Back Precautions , Lumbosacral Orthosis  Comments: don brace in standing; when OOB > 5 min    Assessment  Patient is 66 y.o. female with a diagnosis of back surgery.  Additional factors influencing patient status / progress: Pt will have 24 hour family assist as long as needed. Pt is able to perform ADLs at supervised level of assist or better. Pt will need FWW for home. Pt is demonstrating good safety awareness despite strong desire to be up and moving. Pt's biggest concern is pain control, which she is addressing with nursing assistance.  No further acute OT needs. Anticipate no post acute needs.     Plan    Recommend Occupational Therapy for Evaluation only.    DC Equipment Recommendations: (P) Front-Wheel Walker, Tub / Shower Seat  Discharge Recommendations: (P) Anticipate that the patient will have no further occupational therapy needs after discharge from the hospital        06/22/21 1039   Prior Living Situation   Prior Services Home-Independent;None   Housing / Facility 2 Story House   Steps Into Home 1   Steps In Home 15  (Does not need to go upstairs)   Bathroom Set up Walk In Shower   Equipment Owned None   Lives with - Patient's Self Care Capacity Alone and Able to Care For Self   Comments Son will stay with pt as long as needed.    Prior Level of ADL Function   Self Feeding Independent   Grooming / Hygiene Independent   Bathing Independent   Dressing Independent   Toileting Independent   Prior Level of IADL Function   Medication Management Independent   Laundry Independent   Kitchen Mobility Independent   Finances Independent   Home Management Independent   Shopping Independent   Prior Level Of Mobility Independent Without Device in Community   Driving / Transportation Driving Independent   History of  Falls   History of Falls Yes   Date of Last Fall   (11/2020)   Precautions   Precautions Spinal / Back Precautions ;Lumbosacral Orthosis   Comments don brace in standing; when OOB > 5 min   Cognition    Cognition / Consciousness WDL   Level of Consciousness Alert   Comments Pleasant and cooperative   Balance Assessment   Sitting Balance (Static) Fair +   Sitting Balance (Dynamic) Fair +   Standing Balance (Static) Fair   Standing Balance (Dynamic) Fair   Weight Shift Sitting Fair   Weight Shift Standing Fair   Comments using FWW; no LOB   Bed Mobility    Scooting Supervised   Comments Pt in chair before/after eval   ADL Assessment   Eating Independent   Grooming Standing;Supervision   Lower Body Dressing Supervision   Toileting Supervision   Functional Mobility   Sit to Stand Supervised   Bed, Chair, Wheelchair Transfer Supervised   Toilet Transfers Supervised   Transfer Method Stand Step   Comments using FWW; no LOB   Activity Tolerance   Sitting in Chair > 30 min   Sitting Edge of Bed 5 min   Standing 15 min   Comments Pt prefers to stand   Problem List   Problem List None   Anticipated Discharge Equipment and Recommendations   DC Equipment Recommendations Front-Wheel Walker;Tub / Shower Seat   Discharge Recommendations Anticipate that the patient will have no further occupational therapy needs after discharge from the hospital

## 2021-06-22 NOTE — FACE TO FACE
Face to Face Note  -  Durable Medical Equipment    Patrick Hooker P.A.-C. - NPI: [id number]  I certify that this patient is under my care and that they had a durable medical equipment(DME)face to face encounter by myself that meets the physician DME face-to-face encounter requirements with this patient on:    Date of encounter:   Patient:                    MRN:                       YOB: 2022  Che Tovar  [id number]  1955     The encounter with the patient was in whole, or in part, for the following medical condition, which is the primary reason for durable medical equipment:  Post-Op Surgery    I certify that, based on my findings, the following durable medical equipment is medically necessary:  Walkers.    HOME O2 Saturation Measurements:(Values must be present for Home Oxygen orders)         ,     ,         My Clinical findings support the need for the above equipment due to:  Abnormal Gait, Wound/Incision    Supporting Symptoms: postop pain, impaired mobility, generalized weakness and fall risk.    If patient feels more short of breath, they can go up to 6 liters per minute and contact healthcare provider.

## 2021-06-22 NOTE — PROGRESS NOTES
2 RN skin check complete with JAKE Johnson  Devices in place oxygen, kraft, SCD,   Skin assessed under devices none.  Confirmed pressure ulcers found on none  New potential pressure ulcers noted on none. Wound consult placed no.  The following interventions in place none.  Patient had back surgery, dressing in mid back, CDI.

## 2021-06-22 NOTE — THERAPY
"Physical Therapy   Initial Evaluation     Patient Name: Che Tovar  Age:  66 y.o., Sex:  female  Medical Record #: [id number]  Today's Date: 6/22/2021     Precautions: Spinal / Back Precautions , Lumbosacral Orthosis    Assessment  Patient is a 66 y.o. female s/p L5-S1 spinal fusion with Dr. Bullard on 6/21. Patient was educated on spinal precautions, use of brace, pain management, and graded return to activity following surgery; patient verbalized understanding. Patient required supervision and no AD for bed mobility, transfers, ambulation, and 2 steps. Patient was concerned about getting into high bed with stepstool that is the height of 2 steps. Discussed geting a short step stool to be able to reach the higher one and moving it closer to the nightstand to have something to hold on to when getting in/out of bed. Also discussed use of FWW at home for safety and pain management for longer distances and to promote independence. Given patient's current level of mobility, anticipate patient to be able to safely return home upon DC. Recommend out patient PT following medical clearance to optimize return to PLOF. Patient will not be actively followed for physical therapy services at this time, however may be seen if requested by physician for 1 more visit within 30 days to address any discharge or equipment needs.    Plan    Recommend Physical Therapy for Evaluation only    DC Equipment Recommendations: Front-Wheel Walker  Discharge Recommendations: Recommend outpatient physical therapy services to address higher level deficits       Subjective    \"I like to hike [place].\"     Objective       06/22/21 0942   Total Time Spent   Total Time Spent (Mins) 35   Charge Group   PT Evaluation PT Evaluation Low   Initial Contact Note    Initial Contact Note Order Received and Verified, Evaluation Only - Patient Does Not Require Further Acute Physical Therapy at this Time.  However, May Benefit from Post Acute Therapy for " Higher Level Functional Deficits.   Precautions   Precautions Spinal / Back Precautions ;Lumbosacral Orthosis   Comments LSO when OOB > 5 min, put on while standing   Vitals   O2 (LPM) 0   O2 Delivery Device None - Room Air   Pain 0 - 10 Group   Therapist Pain Assessment Post Activity Pain Same as Prior to Activity;Nurse Notified   Prior Living Situation   Prior Services None   Housing / Facility 1 Story House   Steps Into Home 1   Equipment Owned None   Lives with - Patient's Self Care Capacity Alone and Able to Care For Self   Comments Son with be staying for several weeks and daughter will come help once he leaves; patient uses step stool to get into high bed   Prior Level of Functional Mobility   Bed Mobility Independent   Transfer Status Independent   Ambulation Independent   Distance Ambulation (Feet)   (community)   Assistive Devices Used None   Stairs Independent   Comments enjoys hiking, 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 [employer]   Cognition    Cognition / Consciousness WDL   Level of Consciousness Alert   Comments pleasant, cooperative   Passive ROM Lower Body   Passive ROM Lower Body WDL   Comments not formally tested, WFL for mobility   Active ROM Lower Body    Active ROM Lower Body  WDL   Comments not formally tested, WFL for mobility   Strength Lower Body   Lower Body Strength  WDL   Comments not formally tested, WFL for mobility   Sensation Lower Body   Lower Extremity Sensation   Not Tested   Lower Body Muscle Tone   Lower Body Muscle Tone  WDL   Neurological Concerns   Neurological Concerns No   Coordination Lower Body    Coordination Lower Body  WDL   Balance Assessment   Sitting Balance (Static) Fair +   Sitting Balance (Dynamic) Fair   Standing Balance (Static) Fair +   Standing Balance (Dynamic) Fair   Weight Shift Sitting Good   Weight Shift Standing Fair   Comments w/o AD, no overt LOB   Gait Analysis   Gait Level Of Assist Supervised   Assistive Device None   Distance (Feet) 150   # of  Times Distance was Traveled 1   Deviation   (decreased poonam, step length)   # of Stairs Climbed 4   Level of Assist with Stairs Supervised   Weight Bearing Status FWB   Bed Mobility    Supine to Sit Supervised   Sit to Supine   (NT - left in chair)   Scooting Supervised  (to EOB)   Rolling Supervised   Comments log roll   Functional Mobility   Sit to Stand Supervised   Bed, Chair, Wheelchair Transfer Supervised   Transfer Method Stand Step   How much difficulty does the patient currently have...   Turning over in bed (including adjusting bedclothes, sheets and blankets)? 3   Sitting down on and standing up from a chair with arms (e.g., wheelchair, bedside commode, etc.) 3   Moving from lying on back to sitting on the side of the bed? 3   How much help from another person does the patient currently need...   Moving to and from a bed to a chair (including a wheelchair)? 4   Need to walk in a hospital room? 4   Climbing 3-5 steps with a railing? 3   6 clicks Mobility Score 20   Activity Tolerance   Sitting in Chair left in chair   Sitting Edge of Bed 6 min   Standing 8 min   Comments no report of dizziness, fatigue, SOB   Edema / Skin Assessment   Edema / Skin  Not Assessed   Education Group   Education Provided Role of Physical Therapist;Stair Training;Use of Assistive Device;Brace Wear and Care;Spine Precautions   Spine Precautions Patient Response Patient;Family;Acceptance;Explanation;Demonstration;Handout;Verbal Demonstration;Action Demonstration   Role of Physical Therapist Patient Response Patient;Family;Acceptance;Explanation;Demonstration;Verbal Demonstration;Action Demonstration   Stair Training Patient Response Patient;Family;Acceptance;Explanation;Verbal Demonstration;Action Demonstration   Use of Assistive Device Patient Response Patient;Family;Acceptance;Explanation;Verbal Demonstration   Brace Wear & Care Patient Response Patient;Family;Acceptance;Explanation;Verbal Demonstration;Action Demonstration    Anticipated Discharge Equipment and Recommendations   DC Equipment Recommendations Front-Wheel Walker   Discharge Recommendations Recommend outpatient physical therapy services to address higher level deficits   Interdisciplinary Plan of Care Collaboration   IDT Collaboration with  Nursing   Patient Position at End of Therapy Seated;Call Light within Reach;Tray Table within Reach;Phone within Reach;Family / Friend in Room   Collaboration Comments discussed with RN   Session Information   Date / Session Number  6/22 - 1x only   Priority 0

## 2021-06-23 VITALS
WEIGHT: 125.22 LBS | DIASTOLIC BLOOD PRESSURE: 63 MMHG | TEMPERATURE: 98.9 F | HEIGHT: 60 IN | HEART RATE: 70 BPM | OXYGEN SATURATION: 93 % | RESPIRATION RATE: 16 BRPM | BODY MASS INDEX: 24.58 KG/M2 | SYSTOLIC BLOOD PRESSURE: 105 MMHG

## 2021-06-23 LAB
ANION GAP SERPL CALC-SCNC: 6 MMOL/L (ref 7–16)
APTT PPP: 30.8 SEC (ref 24.7–36)
BUN SERPL-MCNC: 7 MG/DL (ref 8–22)
CALCIUM SERPL-MCNC: 8.4 MG/DL (ref 8.5–10.5)
CHLORIDE SERPL-SCNC: 106 MMOL/L (ref 96–112)
CO2 SERPL-SCNC: 25 MMOL/L (ref 20–33)
CREAT SERPL-MCNC: 0.56 MG/DL (ref 0.5–1.4)
ERYTHROCYTE [DISTWIDTH] IN BLOOD BY AUTOMATED COUNT: 47.1 FL (ref 35.9–50)
GLUCOSE SERPL-MCNC: 131 MG/DL (ref 65–99)
HCT VFR BLD AUTO: 30.9 % (ref 37–47)
HGB BLD-MCNC: 9.8 G/DL (ref 12–16)
INR PPP: 1.08 (ref 0.87–1.13)
MCH RBC QN AUTO: 30.7 PG (ref 27–33)
MCHC RBC AUTO-ENTMCNC: 31.7 G/DL (ref 33.6–35)
MCV RBC AUTO: 96.9 FL (ref 81.4–97.8)
PLATELET # BLD AUTO: 176 K/UL (ref 164–446)
PMV BLD AUTO: 11.9 FL (ref 9–12.9)
POTASSIUM SERPL-SCNC: 4.5 MMOL/L (ref 3.6–5.5)
PROTHROMBIN TIME: 13.7 SEC (ref 12–14.6)
RBC # BLD AUTO: 3.19 M/UL (ref 4.2–5.4)
SODIUM SERPL-SCNC: 137 MMOL/L (ref 135–145)
WBC # BLD AUTO: 5.7 K/UL (ref 4.8–10.8)

## 2021-06-23 PROCEDURE — 36415 COLL VENOUS BLD VENIPUNCTURE: CPT

## 2021-06-23 PROCEDURE — 85027 COMPLETE CBC AUTOMATED: CPT

## 2021-06-23 PROCEDURE — 80048 BASIC METABOLIC PNL TOTAL CA: CPT

## 2021-06-23 PROCEDURE — 85610 PROTHROMBIN TIME: CPT

## 2021-06-23 PROCEDURE — A9270 NON-COVERED ITEM OR SERVICE: HCPCS | Performed by: PHYSICIAN ASSISTANT

## 2021-06-23 PROCEDURE — 700111 HCHG RX REV CODE 636 W/ 250 OVERRIDE (IP): Performed by: PHYSICIAN ASSISTANT

## 2021-06-23 PROCEDURE — 700102 HCHG RX REV CODE 250 W/ 637 OVERRIDE(OP): Performed by: PHYSICIAN ASSISTANT

## 2021-06-23 PROCEDURE — 85730 THROMBOPLASTIN TIME PARTIAL: CPT

## 2021-06-23 RX ORDER — CEPHALEXIN 500 MG/1
500 CAPSULE ORAL 4 TIMES DAILY
Status: DISCONTINUED | OUTPATIENT
Start: 2021-06-23 | End: 2021-06-23 | Stop reason: HOSPADM

## 2021-06-23 RX ORDER — TIZANIDINE 2 MG/1
2 TABLET ORAL 3 TIMES DAILY PRN
Qty: 90 TABLET | Refills: 3 | Status: SHIPPED | OUTPATIENT
Start: 2021-06-23 | End: 2021-08-11

## 2021-06-23 RX ORDER — OXYCODONE HYDROCHLORIDE 10 MG/1
10 TABLET ORAL EVERY 4 HOURS PRN
Status: DISCONTINUED | OUTPATIENT
Start: 2021-06-23 | End: 2021-06-23 | Stop reason: HOSPADM

## 2021-06-23 RX ORDER — ACETAMINOPHEN 500 MG
1000 TABLET ORAL EVERY 6 HOURS
Qty: 240 TABLET | Refills: 0 | Status: SHIPPED | OUTPATIENT
Start: 2021-06-23 | End: 2021-07-23

## 2021-06-23 RX ORDER — OXYCODONE HYDROCHLORIDE 5 MG/1
5-10 TABLET ORAL EVERY 4 HOURS PRN
Qty: 56 TABLET | Refills: 0 | Status: SHIPPED | OUTPATIENT
Start: 2021-06-23 | End: 2021-06-30

## 2021-06-23 RX ORDER — CEPHALEXIN 500 MG/1
500 CAPSULE ORAL 4 TIMES DAILY
Qty: 20 CAPSULE | Refills: 0 | Status: SHIPPED | OUTPATIENT
Start: 2021-06-23 | End: 2021-06-28

## 2021-06-23 RX ORDER — OXYCODONE HYDROCHLORIDE 5 MG/1
5 TABLET ORAL EVERY 4 HOURS PRN
Status: DISCONTINUED | OUTPATIENT
Start: 2021-06-23 | End: 2021-06-23 | Stop reason: HOSPADM

## 2021-06-23 RX ADMIN — ACETAMINOPHEN 1000 MG: 500 TABLET ORAL at 12:20

## 2021-06-23 RX ADMIN — CEPHALEXIN 500 MG: 500 CAPSULE ORAL at 08:34

## 2021-06-23 RX ADMIN — METFORMIN HYDROCHLORIDE 500 MG: 500 TABLET, EXTENDED RELEASE ORAL at 12:20

## 2021-06-23 RX ADMIN — LEVOTHYROXINE SODIUM 75 MCG: 0.07 TABLET ORAL at 05:59

## 2021-06-23 RX ADMIN — GABAPENTIN 300 MG: 300 CAPSULE ORAL at 12:20

## 2021-06-23 RX ADMIN — TIZANIDINE 2 MG: 4 TABLET ORAL at 01:54

## 2021-06-23 RX ADMIN — ACETAMINOPHEN 1000 MG: 500 TABLET ORAL at 06:00

## 2021-06-23 RX ADMIN — METFORMIN HYDROCHLORIDE 500 MG: 500 TABLET, EXTENDED RELEASE ORAL at 08:34

## 2021-06-23 RX ADMIN — GABAPENTIN 300 MG: 300 CAPSULE ORAL at 06:00

## 2021-06-23 RX ADMIN — PRAVASTATIN SODIUM 40 MG: 20 TABLET ORAL at 06:00

## 2021-06-23 RX ADMIN — CEPHALEXIN 500 MG: 500 CAPSULE ORAL at 12:20

## 2021-06-23 RX ADMIN — DOCUSATE SODIUM 100 MG: 100 CAPSULE, LIQUID FILLED ORAL at 06:00

## 2021-06-23 RX ADMIN — ONDANSETRON 4 MG: 4 TABLET, ORALLY DISINTEGRATING ORAL at 03:51

## 2021-06-23 RX ADMIN — TIZANIDINE 2 MG: 4 TABLET ORAL at 12:21

## 2021-06-23 RX ADMIN — OXYCODONE 5 MG: 5 TABLET ORAL at 03:39

## 2021-06-23 RX ADMIN — OXYCODONE HYDROCHLORIDE 10 MG: 10 TABLET ORAL at 08:34

## 2021-06-23 ASSESSMENT — PAIN DESCRIPTION - PAIN TYPE
TYPE: SURGICAL PAIN
TYPE: SURGICAL PAIN

## 2021-06-23 NOTE — CARE PLAN
The patient is Stable - Low risk of patient condition declining or worsening    Shift Goals  Clinical Goals: stop PCA, remove kraft, ambulate, pain control  Patient Goals: pain contorl, remove kraft, ambulate.     Progress made toward(s) clinical / shift goals:  Pt ambulated to restroom with FWW, 1 assist. Pt reports that pain is mostly kept under control with PRN meds, but needs to take them every 4 hrs or the pain becomes unmanageable.    Patient is not progressing towards the following goals: Pt did have one bout of nausea and vomiting though she notes that because she had gastric bypass surgery she cannot vomit, only dry heave. Pt attributed N/V to pain.     Problem: Pain - Standard  Goal: Alleviation of pain or a reduction in pain to the patient’s comfort goal  Outcome: Progressing     Problem: Fall Risk  Goal: Patient will remain free from falls  Outcome: Progressing     Problem: Knowledge Deficit - Standard  Goal: Patient and family/care givers will demonstrate understanding of plan of care, disease process/condition, diagnostic tests and medications  Outcome: Progressing

## 2021-06-23 NOTE — DISCHARGE INSTRUCTIONS
Discharge Instructions    Discharged to home by car with relative. Discharged via wheelchair, hospital escort: Yes.  Special equipment needed: FWW, LSO brace    Be sure to schedule a follow-up appointment with your primary care doctor or any specialists as instructed.     Discharge Plan:        I understand that a diet low in cholesterol, fat, and sodium is recommended for good health. Unless I have been given specific instructions below for another diet, I accept this instruction as my diet prescription.   Other diet: Regular    Special Instructions:       No bending, lifting or twisting as per instructed.  NBo driving while on narcotics.  May shower, no tub baths.   Follow up with Dr Bullard, call for appointment   Telephone # [phone]    · Is patient discharged on Warfarin / Coumadin?   No     Depression / Suicide Risk    As you are discharged from this [facility] facility, it is important to learn how to keep safe from harming yourself.    Recognize the warning signs:  · Abrupt changes in personality, positive or negative- including increase in energy   · Giving away possessions  · Change in eating patterns- significant weight changes-  positive or negative  · Change in sleeping patterns- unable to sleep or sleeping all the time   · Unwillingness or inability to communicate  · Depression  · Unusual sadness, discouragement and loneliness  · Talk of wanting to die  · Neglect of personal appearance   · Rebelliousness- reckless behavior  · Withdrawal from people/activities they love  · Confusion- inability to concentrate     If you or a loved one observes any of these behaviors or has concerns about self-harm, here's what you can do:  · Talk about it- your feelings and reasons for harming yourself  · Remove any means that you might use to hurt yourself (examples: pills, rope, extension cords, firearm)  · Get professional help from the community (Mental Health, Substance Abuse, psychological counseling)  · Do  not be alone:Call your Safe Contact- someone whom you trust who will be there for you.  · Call your local CRISIS HOTLINE 107-9887 or 941-881-4845  · Call your local Children's Mobile Crisis Response Team [place] (692) 688-4976 or www.[employer]  · Call the toll free National Suicide Prevention Hotlines   · National Suicide Prevention Lifeline 375-577-CXNE (5845)  · National Hope Line Network 800-SUICIDE (461-9177)    Spinal Fusion, Adult, Care After  This sheet gives you information about how to care for yourself after your procedure. Your doctor may also give you more specific instructions. If you have problems or questions, contact your doctor.  Follow these instructions at home:  Medicines  · Take over-the-counter and prescription medicines only as told by your doctor. These include any medicines for pain or blood-thinning medicines (anticoagulants).  · If you were prescribed an antibiotic medicine, take it as told by your doctor. Do not stop taking the antibiotic even if you start to feel better.  · Do not drive for 24 hours if you were given a medicine to help you relax (sedative) during your procedure.  · Do not drive or use heavy machinery while taking prescription pain medicine.  If you have a brace:  · Wear the brace as told by your doctor. Take it off only as told by your doctor.  · Keep the brace clean.  Managing pain, stiffness, and swelling  · If directed, put ice on the surgery area:  ? If you have a removable brace, take it off as told by your doctor.  ? Put ice in a plastic bag.  ? Place a towel between your skin and the bag.  ? Leave the ice on for 20 minutes, 2-3 times a day.  Surgery cut care    · Follow instructions from your doctor about how to take care of your cut from surgery (incision). Make sure you:  ? Wash your hands with soap and water before you change your bandage (dressing). If you cannot use soap and water, use hand .  ? Change your bandage as told by your  doctor.  ? Leave stitches (sutures), skin glue, or skin tape (adhesive) strips in place. They may need to stay in place for 2 weeks or longer. If tape strips get loose and curl up, you may trim the loose edges. Do not remove tape strips completely unless your doctor says it is okay.  · Keep your cut from surgery clean and dry.  ? Do not take baths, swim, or use a hot tub until your doctor says it is okay.  ? Ask your doctor if you can take showers. You may only be allowed to take sponge baths.  · Every day, check your cut from surgery and the area around it for:  ? More redness, swelling, or pain.  ? Fluid or blood.  ? Warmth.  ? Pus or a bad smell.  · If you have a drain tube, follow instructions from your doctor about caring for it. Do not take out the drain tube or any bandages unless your doctor says it is okay.  Physical activity  · Rest and protect your back as much as possible.  · Follow instructions from your doctor about how to move. Use good posture to help your spine heal.  · Do not lift anything that is heavier than 8 lb (3.6 kg), or the limit that you are told, until your doctor says that it is safe.  · Do not twist or bend at the waist until your doctor says it is okay.  · It is best if you:  ? Do not make pushing and pulling motions.  ? Do not sit or lie down in the same position for a long time.  ? Do not raise your hands or arms above your head.  · Return to your normal activities as told by your doctor. Ask your doctor what activities are safe for you. Rest and protect your back as much as you can.  · Do not start to exercise until your doctor says it is okay. Ask your doctor what kinds of exercise you can do to make your back stronger.  General instructions  · To prevent blood clots and lessen swelling in your legs:  ? Wear compression stockings as told.  ? Walk one or more times every few hours as told by your doctor.  · Do not use any products that contain nicotine or tobacco, such as cigarettes  and e-cigarettes. These can delay bone healing. If you need help quitting, ask your doctor.  · To prevent or treat constipation while you are taking prescription pain medicine, your doctor may suggest that you:  ? Drink enough fluid to keep your pee (urine) pale yellow.  ? Take over-the-counter or prescription medicines.  ? Eat foods that are high in fiber. These include fresh fruits and vegetables, whole grains, and beans.  ? Limit foods that are high in fat and processed sugars, such as fried and sweet foods.  · Keep all follow-up visits as told by your doctor. This is important.  Contact a doctor if:  · Your pain gets worse.  · Your medicine does not help your pain.  · Your legs or feet get painful or swollen.  · Your cut from surgery is more red, swollen, or painful.  · Your cut from surgery feels warm to the touch.  · You have:  ? Fluid or blood coming from your cut from surgery.  ? Pus or a bad smell coming from your cut from surgery.  ? A fever.  ? Weakness or loss of feeling (numbness) in your legs that is new or getting worse.  ? Trouble controlling when you pee (urinate) or poop (have a bowel movement).  · You feel sick to your stomach (nauseous).  · You throw up (vomit).  Get help right away if:  · Your pain is very bad.  · You have chest pain.  · You have trouble breathing.  · You start to have a cough.  These symptoms may be an emergency. Do not wait to see if the symptoms will go away. Get medical help right away. Call your local emergency services (911 in the [place] Do not drive yourself to the hospital.  Summary  · After the procedure, it is common to have pain in your back and pain by your surgery cut(s).  · Icing and pain medicines may help to control the pain. Follow directions from your doctor.  · Rest and protect your back as much as possible. Do not twist or bend at the waist.  · Get up and walk one or more times every few hours as told by your doctor.  This information is not intended to  replace advice given to you by your health care provider. Make sure you discuss any questions you have with your health care provider.  Document Released: 04/12/2012 Document Revised: 04/09/2020 Document Reviewed: 04/03/2018  Elsevier Patient Education © 2020 [employer]  Oxycodone tablets or capsules  What is this medicine?  OXYCODONE (ox i KOE done) is a pain reliever. It is used to treat moderate to severe pain.  This medicine may be used for other purposes; ask your health care provider or pharmacist if you have questions.  COMMON BRAND NAME(S): Dazidox, Endocodone, Oxaydo, OXECTA, OxyIR, Percolone, Roxicodone, Roxybond  What should I tell my health care provider before I take this medicine?  They need to know if you have any of these conditions:  · Gumaro's disease  · brain tumor  · head injury  · heart disease  · history of drug or alcohol abuse problem  · if you often drink alcohol  · kidney disease  · liver disease  · lung or breathing disease, like asthma  · mental illness  · pancreatic disease  · seizures  · thyroid disease  · an unusual or allergic reaction to oxycodone, codeine, hydrocodone, morphine, other medicines, foods, dyes, or preservatives  · pregnant or trying to get pregnant  · breast-feeding  How should I use this medicine?  Take this medicine by mouth with a glass of water. Follow the directions on the prescription label. You can take it with or without food. If it upsets your stomach, take it with food. Take your medicine at regular intervals. Do not take it more often than directed. Do not stop taking except on your doctor's advice.  Some brands of this medicine, like Oxecta, have special instructions. Ask your doctor or pharmacist if these directions are for you: Do not cut, crush or chew this medicine. Swallow only one tablet at a time. Do not wet, soak, or lick the tablet before you take it.  A special MedGuide will be given to you by the pharmacist with each prescription and refill.  Be sure to read this information carefully each time.  Talk to your pediatrician regarding the use of this medicine in children. Special care may be needed.  Overdosage: If you think you have taken too much of this medicine contact a poison control center or emergency room at once.  NOTE: This medicine is only for you. Do not share this medicine with others.  What if I miss a dose?  If you miss a dose, take it as soon as you can. If it is almost time for your next dose, take only that dose. Do not take double or extra doses.  What may interact with this medicine?  This medicine may interact with the following medications:  · alcohol  · antihistamines for allergy, cough and cold  · antiviral medicines for HIV or AIDS  · atropine  · certain antibiotics like clarithromycin, erythromycin, linezolid, rifampin  · certain medicines for anxiety or sleep  · certain medicines for bladder problems like oxybutynin, tolterodine  · certain medicines for depression like amitriptyline, fluoxetine, sertraline  · certain medicines for fungal infections like ketoconazole, itraconazole, voriconazole  · certain medicines for migraine headache like almotriptan, eletriptan, frovatriptan, naratriptan, rizatriptan, sumatriptan, zolmitriptan  · certain medicines for nausea or vomiting like dolasetron, ondansetron, palonosetron  · certain medicines for Parkinson's disease like benztropine, trihexyphenidyl  · certain medicines for seizures like phenobarbital, phenytoin, primidone  · certain medicines for stomach problems like dicyclomine, hyoscyamine  · certain medicines for travel sickness like scopolamine  · diuretics  · general anesthetics like halothane, isoflurane, methoxyflurane, propofol  · ipratropium  · local anesthetics like lidocaine, pramoxine, tetracaine  · MAOIs like Carbex, Eldepryl, Marplan, Nardil, and Parnate  · medicines that relax muscles for surgery  · methylene blue  · nilotinib  · other narcotic medicines for pain or  cough  · phenothiazines like chlorpromazine, mesoridazine, prochlorperazine, thioridazine  This list may not describe all possible interactions. Give your health care provider a list of all the medicines, herbs, non-prescription drugs, or dietary supplements you use. Also tell them if you smoke, drink alcohol, or use illegal drugs. Some items may interact with your medicine.  What should I watch for while using this medicine?  Tell your doctor or health care professional if your pain does not go away, if it gets worse, or if you have new or a different type of pain. You may develop tolerance to the medicine. Tolerance means that you will need a higher dose of the medicine for pain relief. Tolerance is normal and is expected if you take this medicine for a long time.  Do not suddenly stop taking your medicine because you may develop a severe reaction. Your body becomes used to the medicine. This does NOT mean you are addicted. Addiction is a behavior related to getting and using a drug for a non-medical reason. If you have pain, you have a medical reason to take pain medicine. Your doctor will tell you how much medicine to take. If your doctor wants you to stop the medicine, the dose will be slowly lowered over time to avoid any side effects.  There are different types of narcotic medicines (opiates). If you take more than one type at the same time or if you are taking another medicine that also causes drowsiness, you may have more side effects. Give your health care provider a list of all medicines you use. Your doctor will tell you how much medicine to take. Do not take more medicine than directed. Call emergency for help if you have problems breathing or unusual sleepiness.  You may get drowsy or dizzy. Do not drive, use machinery, or do anything that needs mental alertness until you know how the medicine affects you. Do not stand or sit up quickly, especially if you are an older patient. This reduces the risk of  dizzy or fainting spells. Alcohol may interfere with the effect of this medicine. Avoid alcoholic drinks.  This medicine will cause constipation. Try to have a bowel movement at least every 2 to 3 days. If you do not have a bowel movement for 3 days, call your doctor or health care professional.  Your mouth may get dry. Chewing sugarless gum or sucking hard candy, and drinking plenty of water may help. Contact your doctor if the problem does not go away or is severe.  What side effects may I notice from receiving this medicine?  Side effects that you should report to your doctor or health care professional as soon as possible:  · allergic reactions like skin rash, itching or hives, swelling of the face, lips, or tongue  · breathing problems  · confusion  · signs and symptoms of low blood pressure like dizziness; feeling faint or lightheaded, falls; unusually weak or tired  · trouble passing urine or change in the amount of urine  · trouble swallowing  Side effects that usually do not require medical attention (report to your doctor or health care professional if they continue or are bothersome):  · constipation  · dry mouth  · nausea, vomiting  · tiredness  This list may not describe all possible side effects. Call your doctor for medical advice about side effects. You may report side effects to FDA at 3-630-FDA-9667.  Where should I keep my medicine?  Keep out of the reach of children. This medicine can be abused. Keep your medicine in a safe place to protect it from theft. Do not share this medicine with anyone. Selling or giving away this medicine is dangerous and against the law.  Store at room temperature between 15 and 30 degrees C (59 and 86 degrees F). Protect from light. Keep container tightly closed.  This medicine may cause harm and death if it is taken by other adults, children, or pets. Return medicine that has not been used to an official disposal site. Contact the [employer] at 1-975.420.1969 or your  city/[facility] to find a site. If you cannot return the medicine, flush it down the toilet. Do not use the medicine after the expiration date.  NOTE: This sheet is a summary. It may not cover all possible information. If you have questions about this medicine, talk to your doctor, pharmacist, or health care provider.  © 2020 Elsevier/[employer] (2018-04-24 16:13:10)  Cephalexin tablets or capsules  What is this medicine?  CEPHALEXIN (sef a CB in) is a cephalosporin antibiotic. It is used to treat certain kinds of bacterial infections It will not work for colds, flu, or other viral infections.  This medicine may be used for other purposes; ask your health care provider or pharmacist if you have questions.  COMMON BRAND NAME(S): Biocef, Daxbia, Keflex, Keftab  What should I tell my health care provider before I take this medicine?  They need to know if you have any of these conditions:  · kidney disease  · stomach or intestine problems, especially colitis  · an unusual or allergic reaction to cephalexin, other cephalosporins, penicillins, other antibiotics, medicines, foods, dyes or preservatives  · pregnant or trying to get pregnant  · breast-feeding  How should I use this medicine?  Take this medicine by mouth with a full glass of water. Follow the directions on the prescription label. This medicine can be taken with or without food. Take your medicine at regular intervals. Do not take your medicine more often than directed. Take all of your medicine as directed even if you think you are better. Do not skip doses or stop your medicine early.  Talk to your pediatrician regarding the use of this medicine in children. While this drug may be prescribed for selected conditions, precautions do apply.  Overdosage: If you think you have taken too much of this medicine contact a poison control center or emergency room at once.  NOTE: This medicine is only for you. Do not share this medicine with others.  What if  I miss a dose?  If you miss a dose, take it as soon as you can. If it is almost time for your next dose, take only that dose. Do not take double or extra doses. There should be at least 4 to 6 hours between doses.  What may interact with this medicine?  · probenecid  · some other antibiotics  This list may not describe all possible interactions. Give your health care provider a list of all the medicines, herbs, non-prescription drugs, or dietary supplements you use. Also tell them if you smoke, drink alcohol, or use illegal drugs. Some items may interact with your medicine.  What should I watch for while using this medicine?  Tell your doctor or health care provider if your symptoms do not begin to improve in a few days.  This medicine may cause serious skin reactions. They can happen weeks to months after starting the medicine. Contact your health care provider right away if you notice fevers or flu-like symptoms with a rash. The rash may be red or purple and then turn into blisters or peeling of the skin. Or, you might notice a red rash with swelling of the face, lips or lymph nodes in your neck or under your arms.  Do not treat diarrhea with over the counter products. Contact your doctor if you have diarrhea that lasts more than 2 days or if it is severe and watery.  If you have diabetes, you may get a false-positive result for sugar in your urine. Check with your doctor or health care provider.  What side effects may I notice from receiving this medicine?  Side effects that you should report to your doctor or health care professional as soon as possible:  · allergic reactions like skin rash, itching or hives, swelling of the face, lips, or tongue  · breathing problems  · pain or trouble passing urine  · redness, blistering, peeling or loosening of the skin, including inside the mouth  · severe or watery diarrhea  · unusually weak or tired  · yellowing of the eyes, skin  Side effects that usually do not require  medical attention (report to your doctor or health care professional if they continue or are bothersome):  · gas or heartburn  · genital or anal irritation  · headache  · joint or muscle pain  · nausea, vomiting  This list may not describe all possible side effects. Call your doctor for medical advice about side effects. You may report side effects to FDA at 6-015-TET-0550.  Where should I keep my medicine?  Keep out of the reach of children.  Store at room temperature between 59 and 86 degrees F (15 and 30 degrees C). Throw away any unused medicine after the expiration date.  NOTE: This sheet is a summary. It may not cover all possible information. If you have questions about this medicine, talk to your doctor, pharmacist, or health care provider.  © 2020 Elsevier/[employer] (2020-03-27 07:00:28)

## 2021-06-23 NOTE — DISCHARGE SUMMARY
Discharge Summary    CHIEF COMPLAINT ON ADMISSION  No chief complaint on file.      Reason for Admission  SPINAL STENOSIS OF LUMBAR REGION     Admission Date  6/21/2021    CODE STATUS  Full Code    HPI & HOSPITAL COURSE  This is a 66 y.o. female here with This very nice 66 woman history of right posterior thigh pain.  Its been there for a few years.  She gets buttock and right leg pain.  It gets worse when she walks.  She failed all conservative measures.  We offered a surgery.  She had a grade 2/3 isthmic spondylolisthesis with severe stenosis.  Preoperatively she had a reduced range of motion lumbar spine.    On postoperative day #2 she is doing well.  She reports improvement of her preoperative leg symptoms.  She does have significant incisional site discomfort.  She is eating and drinking without complication.  She was evaluated by therapies who felt she was safe for discharge to home at this time.  We have organize a front wheel walker for her.  She does not report any nausea vomiting fever headache this morning.  She has remained hemodynamically stable.  Based on above information she is deemed safe for discharge in stable condition at this time.       No notes on file    Therefore, she is discharged in good and stable condition to home with close outpatient follow-up.    The patient met 2-midnight criteria for an inpatient stay at the time of discharge.    Discharge Date  6/23/2021     FOLLOW UP ITEMS POST DISCHARGE  Follow up with our office in 2, 6, and 12 weeks.  Report to ER with any complications.  Call our office with any questions or concerns.  No anti-inflammatories for 3 months, no blood thinners for 2 weeks.   Clear to shower Thursday, pat incision dry, no special creams or ointments.   Avoid bending, lifting and twisting.   Walk 4-6 times per day as tolerated to help prevent blood clots.    DISCHARGE DIAGNOSES  Active Problems:    * No active hospital problems. *  Resolved Problems:    * No  resolved hospital problems. *      FOLLOW UP  Future Appointments   Date Time Provider Department Center   7/6/2021  2:00 PM LEXIE Linton None   8/3/2021  2:00 PM LEXIE Linton None     No follow-up provider specified.    MEDICATIONS ON DISCHARGE     Medication List      START taking these medications      Instructions   acetaminophen 500 MG Tabs  Commonly known as: TYLENOL   Take 2 Tablets by mouth every 6 hours for 30 days.  Dose: 1,000 mg     cephALEXin 500 MG Caps  Commonly known as: KEFLEX   Take 1 capsule by mouth 4 times a day for 5 days.  Dose: 500 mg     oxyCODONE immediate-release 5 MG Tabs  Commonly known as: ROXICODONE   Take 1-2 Tablets by mouth every four hours as needed for Severe Pain (NTE 8 tabs per day) for up to 7 days.  Dose: 5-10 mg     tizanidine 2 MG tablet  Commonly known as: ZANAFLEX   Take 1 tablet by mouth 3 times a day as needed (muscle spasm).  Dose: 2 mg        CHANGE how you take these medications      Instructions   levothyroxine 75 MCG Tabs  What changed:   · how much to take  · when to take this  Commonly known as: SYNTHROID   TAKE 1 TABLET BY MOUTH EVERY DAY BEFORE BREAKFAST     pravastatin 40 MG tablet  What changed: when to take this  Commonly known as: PRAVACHOL   TAKE 1 TABLET BY MOUTH EVERY DAY     traZODone 50 MG Tabs  What changed: when to take this  Commonly known as: DESYREL   TAKE 1 TABLET BY MOUTH EVERY NIGHT AT BEDTIME     Trulicity 1.5 MG/0.5ML Sopn  What changed: See the new instructions.  Generic drug: Dulaglutide   ADMINISTER 1.5 MG UNDER THE SKIN EVERY 7 DAYS        CONTINUE taking these medications      Instructions   B-1 PO   Take 1 tablet by mouth every day.  Dose: 1 tablet     Blood Glucose Monitoring Suppl Darcie   Meter: Dispense Device of Insurance Preference. Sig. Use as directed for blood sugar monitoring. #1. NR.     CALCIUM PO   Take 2 Tablets by mouth in the morning, at noon, and at bedtime.  Dose: 2 tablet     COENZYME  Q10 PO   Take 1 capsule by mouth every day.  Dose: 1 capsule     D3 PO   Take 2 Capsules by mouth every day.  Dose: 2 capsule     ferrous sulfate 325 (65 Fe) MG tablet   Take 325 mg by mouth every 7 days. On Friday  Dose: 325 mg     gabapentin 300 MG Caps  Commonly known as: NEURONTIN   Take 300 mg by mouth 3 times a day.  Dose: 300 mg     glucose blood strip  Commonly known as: ONE TOUCH ULTRA TEST   1 Strip by Other route 3 times a day as needed.  Dose: 1 Each     Lancets   Lancets order: Lancets for One Touch Ultra meter. Sig: use once daily     metFORMIN  MG Tb24  Commonly known as: GLUCOPHAGE XR   Doctor's comments: **Patient requests 90 days supply**  Take 1 Tab by mouth 3 times a day, with meals.  Dose: 500 mg     PRENATAL PO   Take 1 tablet by mouth in the morning, at noon, and at bedtime.  Dose: 1 tablet     VITAMIN B-12 PO   Take 1 tablet by mouth every day.  Dose: 1 tablet     Vitamin C 1000 MG Tabs   Take 1,000 mg by mouth every day.  Dose: 1,000 mg        STOP taking these medications    cyclobenzaprine 10 mg Tabs  Commonly known as: Flexeril            Allergies  Allergies   Allergen Reactions   • Nsaids      Other reaction(s): GI Bleeding  Contraindicated after [place] Gastric Bypass or sleeve gastrectomy  due to  risk ulceration in pouch or sleeve.    • Hydrocodone-Acetaminophen      HIVES W/ LORTAB ONLY   • Lisinopril Rash and Cough   • Lovastatin Unspecified     Other reaction(s): Muscle Ache   • Penicillins Rash     .   • Polytrim [Polymyxin B-Trimethoprim]      Eye irritation and itching       DIET  Orders Placed This Encounter   Procedures   • Diet Order Diet: Regular     Standing Status:   Standing     Number of Occurrences:   1     Order Specific Question:   Diet:     Answer:   Regular [1]       ACTIVITY  As tolerated.  Weight bearing as tolerated    CONSULTATIONS  None    PROCEDURES  TITLE OF THE PROCEDURE:    ·  L5-S1 decompressive laminectomy bilateral foraminotomies, and complete  L5-S1 facetectomies (Diaz procedure at L5 and a decompressive laminectomy at S1) (laminectomies for neural decompression, not just for implant placement)  · Bilateral Left transpedicular decompression of the common thecal sac and exiting L5 nerve roots.  · Left L5-S1 and right L5-S1 microscopic microdiscectomies  · Bilateral Anjelica osteotomies at L5-S1 to facilitate reduction of the   · spondylolisthesis.  · Placement of a elevate PEEK interbody cage  · L5-S1 Interbody fusion using local morcellized autograft  · L5-S1 segmental fixation with Solera  [place] PEEK rods  · L5-S1 Posterolateral fusion using local morcellized autograft, BMP and matrix master graft  · O- Arm navigation for screw placement  · Open reduction of L5-S1 spondylolisthesis  i/o On-Q Pain catheters in paraspinal musculature     LABORATORY  Lab Results   Component Value Date    SODIUM 137 06/23/2021    POTASSIUM 4.5 06/23/2021    CHLORIDE 106 06/23/2021    CO2 25 06/23/2021    GLUCOSE 131 (H) 06/23/2021    BUN 7 (L) 06/23/2021    CREATININE 0.56 06/23/2021        Lab Results   Component Value Date    WBC 5.7 06/23/2021    HEMOGLOBIN 9.8 (L) 06/23/2021    HEMATOCRIT 30.9 (L) 06/23/2021    PLATELETCT 176 06/23/2021        Total time of the discharge process exceeds 30 minutes.

## 2021-06-23 NOTE — DISCHARGE PLANNING
Received Choice form at 0900  Agency/Facility Name: [facility]   Referral sent per Choice form @ [phone]

## 2021-06-23 NOTE — PROGRESS NOTES
0700 Patient's in bed. Bedside report reiceved from BINDU Herron NOC RN at the beginning of the shift.    0826 AMY Nguyen visited. POC discussed with the patient. New order received and acknowledged for the patient to be dishcarged to home this afternoon if pain improved and d/c drains this am.    0834 Patients sitting up in bed. Medicated with Oxycodone (see MAR) for c/o's back pain, rates pain 7/10. Educated on the importance/use of IS at least 10x every hour whle awake, able to reach 1750. Fall Protocol in effect. Call light within reach. Reminded patient to call for assist. Assessment completed. No distress noted. Plan of care reviewed with the patient. Verbalized understanding.    0950 Hemovac x2 dc'd as per order. Covered with gauze, steri strips and tegaderm. No bleedinge to the site noted.    1210 Patient's in bed. No distress noted.     1335 On Q pump dc'd as per order. Dressing to lumbar area changed to [place] dressing as per order.    1504  Discharge instructions given to the patient and son. Questions answered. Patient was discharged with patient's belongings, e prescriptions to Pharmacy, FWW, LSO brace via w/c accompanied by one female CNA and son via Private car.

## 2021-06-23 NOTE — PROGRESS NOTES
Neurosurgery  POD# 2  Ambulating  Voiding  Tolerating oral medications  Denies nausea, vomiting, headache this morning  Pain controlled on current medication regimen, poorly overnight, improved this morning  Leg symptoms improved    Objective:  /63   Pulse 70   Temp 37.2 °C (98.9 °F) (Temporal)   Resp 16   Ht 1.524 m (5')   Wt 56.8 kg (125 lb 3.5 oz)   SpO2 93%     Intake/Output Summary (Last 24 hours) at 6/22/2021 0814  Last data filed at 6/22/2021 0400  Gross per 24 hour   Intake 1934 ml   Output 3395 ml   Net -1461 ml       Recent Labs     06/22/21 0444 06/23/21  0512   WBC 6.6 5.7   RBC 3.51* 3.19*   HEMOGLOBIN 10.7* 9.8*   HEMATOCRIT 33.3* 30.9*   MCV 94.9 96.9   MCH 30.5 30.7   MCHC 32.1* 31.7*   RDW 46.2 47.1   PLATELETCT 210 176   MPV 11.4 11.9     Recent Labs     06/22/21 0444 06/23/21  0512   SODIUM 132* 137   POTASSIUM 4.4 4.5   CHLORIDE 101 106   CO2 24 25   GLUCOSE 117* 131*   BUN 10 7*     Recent Labs     06/22/21 0444 06/23/21  0512   APTT 25.6 30.8   INR 1.05 1.08     VSS  LS, Hgb 9.8, asymptomatic  Hemovac 10 and 10cc/8hr am  Surgical incision clean, dry, intact, no evidence of infection  Strength:  Lower extremities are 5/5 grossly  Otherwise neurologically intact    Assessment:  There are no active hospital problems to display for this patient.    POD#2 S/p L5-S1 TLIF    Plan:  1. Ambulate with PT/OT as tolerated - LSO when OOB and active  2. Advance diet as tolerated  3. Increase pain medications per MAR, add keflex  4. Remove hemovac this morning, On-q pump prior to discharge  5. Aim for home this afternoon if pain improved. Scripts sent to pharmacy. Call with any issues.

## 2021-06-23 NOTE — DISCHARGE PLANNING
Anticipated Discharge Disposition: Home    Action: Lsw spoke with pt to get DME choice. Pt is agreeable with [facility]. Lsw faxed choice to ÁNGEL Salgado    Barriers to Discharge: none    Plan: Pt to dc home

## 2021-06-23 NOTE — CARE PLAN
The patient is Stable - Low risk of patient condition declining or worsening    Shift Goals  Clinical Goals:  Ambulate and pain control  Patient Goals: ambulate and pain control     Progress made toward(s) clinical / shift goals:      Patient is not progressing towards the following goals:    Progress made toward(s) clinical / shift goals:       Patient is not progressing towards the following goals:       Problem: Pain - Standard  Goal: Alleviation of pain or a reduction in pain to the patient’s comfort goal  Outcome: Progressing    On pain medication regimen. AMY Nguyen changed pain medication. Medicated as per MAR. Educated on pain scale.      Problem: Fall Risk  Goal: Patient will remain free from falls  Outcome: Progressing    Educated to call for assist. Non skis socks in use. Hourly rounds in effect. Verbalized understanding.      Problem: Knowledge Deficit - Standard  Goal: Patient and family/care givers will demonstrate understanding of plan of care, disease process/condition, diagnostic tests and medications  Outcome: Progressing    POC discussed with the patient. Discharge instructions given to the patient. Questions answered. Verbalized understanding.

## 2021-07-01 NOTE — H&P
Surgery Neurosurgery History & Physical Note    Date  7/1/2021    Primary Care Physician  ONELIA Johnson    CC  * No Diagnosis Codes entered *    [facility]  11/03/2020:  This is a very nice 65-year-old woman.  She reports she has had right buttock and posterior thigh pain for the last few years, but it has been worse in the last year or so.  When she is sitting, she is pretty good; she has no back pain; she has some buttock pain. When she stands, she said that the buttock pain is about a 6 out of 10 and the posterior thigh pain is about 3.  There is nothing really below the knee.  It is more the S1 distribution.  She was taking gabapentin 100 mg t.i.d. to good effect.  She had a selected nerve root block on the right side yesterday.  Her left leg is okay.  She has done extensive physical therapy.  She is taking gabapentin and Tylenol at the moment.  She did have some bursitis symptoms and saw Dr. Hays and this was cleared.    She had a selected nerve root block yesterday.  She has had previous gastric weight reduction surgery and has lost a lot of weight.  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  She is .  She is a non-smoker.  She has a daughter who can stay with her.     Patient's current BMI is 24.61. Pain on a scale of 0 to 10 based on verbal rating numeric scale: 6 Former smoker/user of nicotine  Consumes caffeine.  Drinks 5-6 drinks of alcohol weekly    12/15/2020:  Che returns.  She is doing okay.  She had a selective nerve block which gave her transient relief.  She has taken Lyrica.  She is down to 1200 mg per day of gabapentin.  She did get a little bit of dizziness and had a fall, but is not dizzy at the moment.  We discussed the various treatment options.  For now, she just wants to see how things go.  She is not keen on surgery right now.   04/27/2021:  The patient returns for review.  Her chief complaint is right leg pain in the L5-S1  distribution.  She had a selective nerve root block by Dr. Robles just before the new year, and this helped her.  The pain is back.  She is on 1200 mg of gabapentin per day.  She wants to proceed with surgery.  I think this is reasonable.      Past Medical History:   Diagnosis Date   • Allergy    • Anesthesia    • Anxiety    • Diabetes (HCC)    • High cholesterol    • Pain     HIP AND BACK   • PONV (postoperative nausea and vomiting)    • Psychiatric problem     DEPRESSION AND ANXIETY   • Thyroid disease        Past Surgical History:   Procedure Laterality Date   • LUMBAR FUSION O-ARM  6/21/2021    Procedure: FUSION, SPINE, LUMBAR, WITH O-ARM IMAGING GUIDANCE - L5-S1.;  Surgeon: Germaine Bullard M.D.;  Location: SURGERY [facility];  Service: Neurosurgery   • ABDOMINAL EXPLORATION     • EYE SURGERY     • GASTRIC BYPASS LAPAROSCOPIC     • HERNIA REPAIR         No current facility-administered medications for this encounter.     Current Outpatient Medications   Medication Sig Dispense Refill   • acetaminophen (TYLENOL) 500 MG Tab Take 2 Tablets by mouth every 6 hours for 30 days. 240 tablet 0   • tizanidine (ZANAFLEX) 2 MG tablet Take 1 tablet by mouth 3 times a day as needed (muscle spasm). 90 tablet 3   • CALCIUM PO Take 2 Tablets by mouth in the morning, at noon, and at bedtime.     • Prenatal Vit-Fe Fumarate-FA (PRENATAL PO) Take 1 tablet by mouth in the morning, at noon, and at bedtime.     • Cholecalciferol (D3 PO) Take 2 Capsules by mouth every day.     • Thiamine HCl (B-1 PO) Take 1 tablet by mouth every day.     • methylPREDNISolone (MEDROL DOSEPAK) 4 MG Tablet Therapy Pack Follow schedule on package instructions. 21 tablet 0   • TRULICITY 1.5 MG/0.5ML Solution Pen-injector ADMINISTER 1.5 MG UNDER THE SKIN EVERY 7 DAYS (Patient taking differently: Inject 0.5 mL under the skin every 7 days. On Fridays) 2 mL 3   • Ascorbic Acid (VITAMIN C) 1000 MG Tab Take 1,000 mg by mouth every day.     • Cyanocobalamin  (VITAMIN B-12 PO) Take 1 tablet by mouth every day.     • gabapentin (NEURONTIN) 300 MG Cap Take 300 mg by mouth 3 times a day.     • ferrous sulfate 325 (65 Fe) MG tablet Take 325 mg by mouth every 7 days. On Friday     • COENZYME Q10 PO Take 1 capsule by mouth every day.     • levothyroxine (SYNTHROID) 75 MCG Tab TAKE 1 TABLET BY MOUTH EVERY DAY BEFORE BREAKFAST (Patient taking differently: Take 75 mcg by mouth every morning on an empty stomach.) 90 Tab 1   • metFORMIN ER (GLUCOPHAGE XR) 500 MG TABLET SR 24 HR Take 1 Tab by mouth 3 times a day, with meals. 270 Tab 1   • traZODone (DESYREL) 50 MG Tab TAKE 1 TABLET BY MOUTH EVERY NIGHT AT BEDTIME (Patient taking differently: Take 50 mg by mouth at bedtime.) 90 Tab 3   • pravastatin (PRAVACHOL) 40 MG tablet TAKE 1 TABLET BY MOUTH EVERY DAY (Patient taking differently: Take 40 mg by mouth every day.) 90 Tab 1   • glucose blood (ONE TOUCH ULTRA TEST) strip 1 Strip by Other route 3 times a day as needed. 100 Strip 2   • Blood Glucose Monitoring Suppl Device Meter: Dispense Device of Insurance Preference. Sig. Use as directed for blood sugar monitoring. #1. NR. 1 Device 0   • Lancets Lancets order: Lancets for One Touch Ultra meter. Sig: use once daily 100 Each 3       Social History     Socioeconomic History   • Marital status: Single     Spouse name: Not on file   • Number of children: Not on file   • Years of education: Not on file   • Highest education level: Not on file   Occupational History   • Not on file   Tobacco Use   • Smoking status: Former Smoker     Years: 5.00   • Smokeless tobacco: Never Used   • Tobacco comment: light smoker, quit 40 years ago   Vaping Use   • Vaping Use: Never used   Substance and Sexual Activity   • Alcohol use: Yes     Alcohol/week: 0.0 oz     Comment: 1/DAY   • Drug use: No   • Sexual activity: Yes     Partners: Male   Other Topics Concern   • Not on file   Social History Narrative   • Not on file     Social Determinants of Health      Financial Resource Strain:    • Difficulty of Paying Living Expenses:    Food Insecurity:    • Worried About Running Out of Food in the Last Year:    • Ran Out of Food in the Last Year:    Transportation Needs:    • Lack of Transportation (Medical):    • Lack of Transportation (Non-Medical):    Physical Activity:    • Days of Exercise per Week:    • Minutes of Exercise per Session:    Stress:    • Feeling of Stress :    Social Connections:    • Frequency of Communication with Friends and Family:    • Frequency of Social Gatherings with Friends and Family:    • Attends [employer] Services:    • Active Member of Clubs or Organizations:    • Attends Club or Organization Meetings:    • Marital Status:    Intimate Partner Violence:    • Fear of Current or Ex-Partner:    • Emotionally Abused:    • Physically Abused:    • Sexually Abused:        Family History   Problem Relation Age of Onset   • Arthritis Mother    • Diabetes Mother    • Hypertension Mother    • Hyperlipidemia Mother    • Stroke Mother    • Arthritis Father    • Hyperlipidemia Father    • Hypertension Father    • Diabetes Father    • Arthritis Sister    • Cancer Sister    • Hypertension Sister    • Hyperlipidemia Sister    • Alcohol/Drug Sister    • Breast Cancer Sister    • Hyperlipidemia Brother    • Arthritis Brother    • Hypertension Brother    • Alcohol/Drug Brother    • Arthritis Sister    • Cancer Sister    • Hypertension Sister    • Hyperlipidemia Sister    • Breast Cancer Sister    • Arthritis Sister    • Cancer Sister    • Hyperlipidemia Sister    • Breast Cancer Sister    • Arthritis Sister    • Cancer Sister    • Hyperlipidemia Sister    • Breast Cancer Sister    • Arthritis Sister    • Hyperlipidemia Sister    • Hyperlipidemia Sister    • Hyperlipidemia Sister    • Hyperlipidemia Brother    • Arthritis Brother    • Hyperlipidemia Brother    • Hyperlipidemia Sister        Allergies  Nsaids, Hydrocodone-acetaminophen, Lisinopril, Lovastatin,  Penicillins, and Polytrim [polymyxin b-trimethoprim]    Review of Systems      Physical Exam    Vital Signs  Blood Pressure : 105/63   Temperature: 37.2 °C (98.9 °F)   Pulse: 70   Respiration: 16   Pulse Oximetry: 93 %   11/03/2020:  This is a very nice 65-year-old woman.  She reports she has had right buttock and posterior thigh pain for the last few years, but it has been worse in the last year or so.  When she is sitting, she is pretty good; she has no back pain; she has some buttock pain. When she stands, she said that the buttock pain is about a 6 out of 10 and the posterior thigh pain is about 3.  There is nothing really below the knee.  It is more the S1 distribution.  She was taking gabapentin 100 mg t.i.d. to good effect.  She had a selected nerve root block on the right side yesterday.  Her left leg is okay.  She has done extensive physical therapy.  She is taking gabapentin and Tylenol at the moment.  She did have some bursitis symptoms and saw Dr. Hays and this was cleared.    She had a selected nerve root block yesterday.  She has had previous gastric weight reduction surgery and has lost a lot of weight.  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  She is .  She is a non-smoker.  She has a daughter who can stay with her.     Patient's current BMI is 24.61. Pain on a scale of 0 to 10 based on verbal rating numeric scale: 6 Former smoker/user of nicotine  Consumes caffeine.  Drinks 5-6 drinks of alcohol weekly    12/15/2020:  Che returns.  She is doing okay.  She had a selective nerve block which gave her transient relief.  She has taken Lyrica.  She is down to 1200 mg per day of gabapentin.  She did get a little bit of dizziness and had a fall, but is not dizzy at the moment.  We discussed the various treatment options.  For now, she just wants to see how things go.  She is not keen on surgery right now.   04/27/2021:  The patient returns for  review.  Her chief complaint is right leg pain in the L5-S1 distribution.  She had a selective nerve root block by Dr. Robles just before the new year, and this helped her.  The pain is back.  She is on 1200 mg of gabapentin per day.  She wants to proceed with surgery.  I think this is reasonable.      Labs:                    Radiology:  DX-PORTABLE FLUORO > 1 HOUR   Final Result      Portable fluoroscopy utilized for 13 seconds.         INTERPRETING LOCATION: [address], [place], [address]      DX-LUMBAR SPINE-2 OR 3 VIEWS   Final Result      Intraoperative images intended for localization and not for diagnostic purposes demonstrate L5-S1 instrument fusion. See procedure report for details.      Fluoroscopy Time:  13 seconds.  3 fluoroscopic images were obtained.      DX-O-ARM   Final Result      Portable O-arm utilized for 2.50 seconds.         INTERPRETING LOCATION: [address], [place], [address]        I independently reviewed and assessed the imaging. I also reviewed all imaging reports.   Her xrays  are from [facility] and MR are from [facility]. She had a Grade 2/3 L5-S1 isthmic spondylolisthesis with severe  bilateral foraminal stenosis. L3/4 and L2/3 are degenerate    Assessment/Plan:    1. Grade 2/3 L5-S1 isthmic spondylolisthesis with severe bilateral foraminal stenosis.  2. Possible right SI joint dysfunction.  3. Failed conservative therapy The procedure would be performed with pedicle screw fixation, cages, rods and local and local bone graft (TLIF procedure).The fusion is for both perceived preoperative instability as well as potential iatrogenic instability from the decompression to address foraminal stenosis. I discussed the surgical procedure, goals alternatives, risks and potential complications in detail. Risks of a general anesthetic include but are not limited to death, cardiorespiratory compromise, MI, DVT, PE and potential anesthetic related problems to be discussed with anesthesiology preoperatively. I  discussed risk of nerve root injury transient or permanent, remote risk of paralysis, regenerating nerve root phenomenon, infection, CSF leak, bowel and bladder difficulties, non-union, hardware failure, adjacent segment disease, hemorrhage and possible need for blood transfusion were discussed. We discussed possible need for further surgery. The potential implications of all of the above risks were discussed in detail with potential risks not limited to the above. No guarantee was given or implied. It was explained the risks of surgery included but was not limited to the preceding list. Discussed no absolute guarantee of success and possible need of further surgery. Discussed regenerating nerve root phenomenon and associated symptoms. A handout was provided. I used the bone model, imaging and handout literature to assist the patient with their decision-making. I have answered all questions to the best of my ability. The patient wishes to think over their options and my recommended treatment plan and will let us know how they wish to proceed. The use of 'Off label' FDA products, if used, was discussed. All risks relating to this covered. I explained to the patient we may be using neurophysiological monitoring during the case (EMG/SSEP/MEP). We can put them in touch with our monitoring service if requested for costs etc. I recommend the patient visit our web site [email or web address] to further review conservative and surgical treatment options and [email or web address] or [email or web address] for more information. I discussed the nature of the procedure with the patient in detail and used bone models and gave literature as well as referring the patient to appropriate Internet sites.  The patient was provided with a copy of their dictation and encouraged to contact me with questions if they did not understand everything. We will order a lumbar brace/orthosis to support the patient's weak spinal muscles and reduce  pain by restricting mobility of the trunk particularly flexion, extension and rotation.  I spent at least 30 minutes counseling and coordinating care with this patient more than 50% of which was face to face. Any tests ordered, review of systems, medications and treatment plan were all reviewed with a final plan given to the patient.    Plan   1. She is a stable diabetic, but we will just get a routine preoperative workup.    2. We will use a Cell Saver.    cc. AMY Brantley MD         * No Diagnosis Codes entered *  Procedure(s):  FUSION, SPINE, LUMBAR, WITH O-ARM IMAGING GUIDANCE - L5-S1.

## 2021-07-22 DIAGNOSIS — E11.9 TYPE 2 DIABETES MELLITUS WITHOUT COMPLICATION, WITHOUT LONG-TERM CURRENT USE OF INSULIN (HCC): ICD-10-CM

## 2021-07-23 ENCOUNTER — PATIENT MESSAGE (OUTPATIENT)
Dept: MEDICAL GROUP | Facility: MEDICAL CENTER | Age: 66
End: 2021-07-23

## 2021-07-23 DIAGNOSIS — E11.9 TYPE 2 DIABETES MELLITUS WITHOUT COMPLICATION, WITHOUT LONG-TERM CURRENT USE OF INSULIN (HCC): ICD-10-CM

## 2021-07-23 RX ORDER — DULAGLUTIDE 1.5 MG/.5ML
0.5 INJECTION, SOLUTION SUBCUTANEOUS
Qty: 2 ML | Refills: 0 | Status: SHIPPED | OUTPATIENT
Start: 2021-07-23 | End: 2021-08-11 | Stop reason: SDUPTHER

## 2021-07-23 RX ORDER — DULAGLUTIDE 1.5 MG/.5ML
0.5 INJECTION, SOLUTION SUBCUTANEOUS
Qty: 2 ML | Refills: 0 | Status: SHIPPED | OUTPATIENT
Start: 2021-07-23 | End: 2021-07-23 | Stop reason: SDUPTHER

## 2021-07-23 NOTE — TELEPHONE ENCOUNTER
Received request via: Pharmacy    Was the patient seen in the last year in this department? No     Does the patient have an active prescription (recently filled or refills available) for medication(s) requested? No     [employer] message send to make annual appt.

## 2021-07-23 NOTE — PATIENT COMMUNICATION
Received request via: Patient    Was the patient seen in the last year in this department? Yes    Does the patient have an active prescription (recently filled or refills available) for medication(s) requested? Yes. Pt requested new pharmacy

## 2021-08-11 ENCOUNTER — OFFICE VISIT (OUTPATIENT)
Dept: MEDICAL GROUP | Facility: MEDICAL CENTER | Age: 66
End: 2021-08-11
Payer: MEDICARE

## 2021-08-11 VITALS
HEIGHT: 64 IN | SYSTOLIC BLOOD PRESSURE: 110 MMHG | OXYGEN SATURATION: 96 % | HEART RATE: 90 BPM | RESPIRATION RATE: 16 BRPM | TEMPERATURE: 96.9 F | BODY MASS INDEX: 21.23 KG/M2 | DIASTOLIC BLOOD PRESSURE: 70 MMHG | WEIGHT: 124.34 LBS

## 2021-08-11 DIAGNOSIS — E78.00 PURE HYPERCHOLESTEROLEMIA: ICD-10-CM

## 2021-08-11 DIAGNOSIS — I10 ESSENTIAL HYPERTENSION: ICD-10-CM

## 2021-08-11 DIAGNOSIS — Z98.1 S/P LUMBAR SPINAL FUSION: ICD-10-CM

## 2021-08-11 DIAGNOSIS — E03.9 ACQUIRED HYPOTHYROIDISM: ICD-10-CM

## 2021-08-11 DIAGNOSIS — D50.8 IRON DEFICIENCY ANEMIA SECONDARY TO INADEQUATE DIETARY IRON INTAKE: ICD-10-CM

## 2021-08-11 DIAGNOSIS — E11.69 HYPERLIPIDEMIA DUE TO TYPE 2 DIABETES MELLITUS (HCC): ICD-10-CM

## 2021-08-11 DIAGNOSIS — N89.8 VAGINAL LESION: ICD-10-CM

## 2021-08-11 DIAGNOSIS — E11.9 TYPE 2 DIABETES MELLITUS WITHOUT COMPLICATION, WITHOUT LONG-TERM CURRENT USE OF INSULIN (HCC): ICD-10-CM

## 2021-08-11 DIAGNOSIS — Z98.84 S/P GASTRIC BYPASS: ICD-10-CM

## 2021-08-11 DIAGNOSIS — E78.5 HYPERLIPIDEMIA DUE TO TYPE 2 DIABETES MELLITUS (HCC): ICD-10-CM

## 2021-08-11 PROCEDURE — 99214 OFFICE O/P EST MOD 30 MIN: CPT | Performed by: NURSE PRACTITIONER

## 2021-08-11 RX ORDER — LEVOTHYROXINE SODIUM 0.07 MG/1
75 TABLET ORAL
Qty: 90 TABLET | Refills: 3 | Status: SHIPPED | OUTPATIENT
Start: 2021-08-11 | End: 2022-12-15 | Stop reason: SDUPTHER

## 2021-08-11 RX ORDER — TRAZODONE HYDROCHLORIDE 100 MG/1
100 TABLET ORAL NIGHTLY PRN
Qty: 90 TABLET | Refills: 3 | Status: SHIPPED | OUTPATIENT
Start: 2021-08-11 | End: 2022-08-15 | Stop reason: SDUPTHER

## 2021-08-11 RX ORDER — DULAGLUTIDE 1.5 MG/.5ML
0.5 INJECTION, SOLUTION SUBCUTANEOUS
Qty: 2 ML | Refills: 5 | Status: SHIPPED | OUTPATIENT
Start: 2021-08-11 | End: 2022-02-23 | Stop reason: SDUPTHER

## 2021-08-11 SDOH — ECONOMIC STABILITY: TRANSPORTATION INSECURITY
IN THE PAST 12 MONTHS, HAS LACK OF TRANSPORTATION KEPT YOU FROM MEETINGS, WORK, OR FROM GETTING THINGS NEEDED FOR DAILY LIVING?: NO

## 2021-08-11 SDOH — ECONOMIC STABILITY: INCOME INSECURITY: HOW HARD IS IT FOR YOU TO PAY FOR THE VERY BASICS LIKE FOOD, HOUSING, MEDICAL CARE, AND HEATING?: NOT VERY HARD

## 2021-08-11 SDOH — ECONOMIC STABILITY: TRANSPORTATION INSECURITY
IN THE PAST 12 MONTHS, HAS THE LACK OF TRANSPORTATION KEPT YOU FROM MEDICAL APPOINTMENTS OR FROM GETTING MEDICATIONS?: NO

## 2021-08-11 SDOH — ECONOMIC STABILITY: HOUSING INSECURITY
IN THE LAST 12 MONTHS, WAS THERE A TIME WHEN YOU DID NOT HAVE A STEADY PLACE TO SLEEP OR SLEPT IN A SHELTER (INCLUDING NOW)?: NO

## 2021-08-11 SDOH — ECONOMIC STABILITY: HOUSING INSECURITY

## 2021-08-11 SDOH — ECONOMIC STABILITY: FOOD INSECURITY: WITHIN THE PAST 12 MONTHS, THE FOOD YOU BOUGHT JUST DIDN'T LAST AND YOU DIDN'T HAVE MONEY TO GET MORE.: NEVER TRUE

## 2021-08-11 SDOH — HEALTH STABILITY: PHYSICAL HEALTH: ON AVERAGE, HOW MANY MINUTES DO YOU ENGAGE IN EXERCISE AT THIS LEVEL?: 20 MIN

## 2021-08-11 SDOH — ECONOMIC STABILITY: FOOD INSECURITY: WITHIN THE PAST 12 MONTHS, YOU WORRIED THAT YOUR FOOD WOULD RUN OUT BEFORE YOU GOT MONEY TO BUY MORE.: NEVER TRUE

## 2021-08-11 SDOH — HEALTH STABILITY: PHYSICAL HEALTH: ON AVERAGE, HOW MANY DAYS PER WEEK DO YOU ENGAGE IN MODERATE TO STRENUOUS EXERCISE (LIKE A BRISK WALK)?: 4 DAYS

## 2021-08-11 SDOH — HEALTH STABILITY: MENTAL HEALTH
STRESS IS WHEN SOMEONE FEELS TENSE, NERVOUS, ANXIOUS, OR CAN'T SLEEP AT NIGHT BECAUSE THEIR MIND IS TROUBLED. HOW STRESSED ARE YOU?: TO SOME EXTENT

## 2021-08-11 SDOH — ECONOMIC STABILITY: TRANSPORTATION INSECURITY
IN THE PAST 12 MONTHS, HAS LACK OF RELIABLE TRANSPORTATION KEPT YOU FROM MEDICAL APPOINTMENTS, MEETINGS, WORK OR FROM GETTING THINGS NEEDED FOR DAILY LIVING?: NO

## 2021-08-11 ASSESSMENT — SOCIAL DETERMINANTS OF HEALTH (SDOH)
HOW OFTEN DO YOU ATTEND CHURCH OR RELIGIOUS SERVICES?: MORE THAN 4 TIMES PER YEAR
HOW OFTEN DO YOU ATTEND CHURCH OR RELIGIOUS SERVICES?: MORE THAN 4 TIMES PER YEAR
HOW MANY DRINKS CONTAINING ALCOHOL DO YOU HAVE ON A TYPICAL DAY WHEN YOU ARE DRINKING: 1 OR 2
DO YOU BELONG TO ANY CLUBS OR ORGANIZATIONS SUCH AS CHURCH GROUPS UNIONS, FRATERNAL OR ATHLETIC GROUPS, OR SCHOOL GROUPS?: NO
HOW HARD IS IT FOR YOU TO PAY FOR THE VERY BASICS LIKE FOOD, HOUSING, MEDICAL CARE, AND HEATING?: NOT VERY HARD
HOW OFTEN DO YOU GET TOGETHER WITH FRIENDS OR RELATIVES?: ONCE A WEEK
HOW OFTEN DO YOU ATTENT MEETINGS OF THE CLUB OR ORGANIZATION YOU BELONG TO?: NEVER
HOW OFTEN DO YOU GET TOGETHER WITH FRIENDS OR RELATIVES?: ONCE A WEEK
IN A TYPICAL WEEK, HOW MANY TIMES DO YOU TALK ON THE PHONE WITH FAMILY, FRIENDS, OR NEIGHBORS?: MORE THAN THREE TIMES A WEEK
WITHIN THE PAST 12 MONTHS, YOU WORRIED THAT YOUR FOOD WOULD RUN OUT BEFORE YOU GOT THE MONEY TO BUY MORE: NEVER TRUE
DO YOU BELONG TO ANY CLUBS OR ORGANIZATIONS SUCH AS CHURCH GROUPS UNIONS, FRATERNAL OR ATHLETIC GROUPS, OR SCHOOL GROUPS?: NO
IN A TYPICAL WEEK, HOW MANY TIMES DO YOU TALK ON THE PHONE WITH FAMILY, FRIENDS, OR NEIGHBORS?: MORE THAN THREE TIMES A WEEK
HOW OFTEN DO YOU ATTENT MEETINGS OF THE CLUB OR ORGANIZATION YOU BELONG TO?: NEVER
HOW OFTEN DO YOU HAVE SIX OR MORE DRINKS ON ONE OCCASION: NEVER

## 2021-08-11 ASSESSMENT — PATIENT HEALTH QUESTIONNAIRE - PHQ9: CLINICAL INTERPRETATION OF PHQ2 SCORE: 0

## 2021-08-11 ASSESSMENT — LIFESTYLE VARIABLES
HOW MANY STANDARD DRINKS CONTAINING ALCOHOL DO YOU HAVE ON A TYPICAL DAY: 1 OR 2
HOW OFTEN DO YOU HAVE SIX OR MORE DRINKS ON ONE OCCASION: NEVER

## 2021-08-11 ASSESSMENT — FIBROSIS 4 INDEX: FIB4 SCORE: 1.83

## 2021-08-11 NOTE — ASSESSMENT & PLAN NOTE
Previously on pravastatin, felt that it was causing her some muscle aches and discontinued after her spinal surgery.  We will reevaluate lipid panel, could consider an alternative statin if needed

## 2021-08-11 NOTE — PROGRESS NOTES
Subjective:     Chief Complaint   Patient presents with   • Annual Exam     post spine surgery     Che Tovar is a 66 y.o. female here today to follow up on:    Type 2 diabetes mellitus without complication (CMS-HCC)  last A1c was well controlled at 6.7.  She is using Trulicity and Metformin both of which are working very well for her.  No polyuria, polydipsia, vision changes    S/P gastric bypass  She has had chronic iron deficiency following gastric bypass.  Currently taking a prenatal vitamin with iron    Acquired hypothyroidism  Chronic issue currently managed with levothyroxine 75 mcg daily.  Consistent with medication.  She is feeling some concerns with low energy and mild depression, unsure if this is related to her limitations from recent back surgery.  No weight changes, constipation, heat or cold intolerance    Vaginal lesion  States that she noticed 2 small bumps in the right perineal region a few months ago.  These are nonpainful, not changing or growing    Hyperlipidemia due to type 2 diabetes mellitus (HCC)  Previously on pravastatin, felt that it was causing her some muscle aches and discontinued after her spinal surgery.  We will reevaluate lipid panel, could consider an alternative statin if needed    S/P lumbar spinal fusion  Lumbar spinal fusion L5-S1 completed in June with Dr. Jing Motley.  She initially had some postop pain issues but this has gradually subsided.  At this time her pain is adequately controlled with just Tylenol.  She is wearing a back brace, still taking it very easy with her physical activity.  She will not be starting physical therapy for a while yet     Depression Screening    Little interest or pleasure in doing things?  0 - not at all  Feeling down, depressed , or hopeless? 0 - not at all  Patient Health Questionnaire Score: 0     If depressive symptoms identified deferred to follow up visit unless specifically addressed in assessment and plan.    Interpretation of  PHQ-9 Total Score   Score Severity   1-4 No Depression   5-9 Mild Depression   10-14 Moderate Depression   15-19 Moderately Severe Depression   20-27 Severe Depression    Health Maintenance Summary                DIABETES MONOFILAMENT / LE EXAM Overdue 1955     RETINAL SCREENING Overdue 9/28/2018      Done 9/28/2017 REFERRAL FOR RETINAL SCREENING EXAM    URINE ACR / MICROALBUMIN Overdue 3/25/2020      Done 3/25/2019 MICROALBUMIN CREAT RATIO URINE     Patient has more history with this topic...    FASTING LIPID PROFILE Overdue 2/22/2021      Done 2/22/2020 LIPID PROFILE     Patient has more history with this topic...    IMM ZOSTER VACCINES Overdue 4/28/2021      Done 3/3/2021 Imm Admin: Zoster Vaccine Recombinant (RZV) (SHINGRIX)     Patient has more history with this topic...    A1C SCREENING Overdue 8/1/2021      Done 2/1/2021 HEMOGLOBIN A1C     Patient has more history with this topic...    IMM INFLUENZA Next Due 9/1/2021      Done 10/1/2020 Imm Admin: Influenza Vaccine Adult HD     Patient has more history with this topic...    MAMMOGRAM Next Due 10/7/2021      Done 10/7/2020 MA-SCREENING MAMMO BILAT W/TOMOSYNTHESIS W/CAD     Patient has more history with this topic...    COLORECTAL CANCER SCREENING Next Due 10/10/2021     SERUM CREATININE Next Due 6/23/2022      Done 6/23/2021 BASIC METABOLIC PANEL     Patient has more history with this topic...    BONE DENSITY Next Due 9/24/2023      Done 9/24/2018 DS-BONE DENSITY STUDY (DEXA)    IMM PNEUMOCOCCAL VACCINE: 65+ Years Next Due 8/22/2024      Done 8/22/2019 Imm Admin: Pneumococcal Conjugate Vaccine (Prevnar/PCV-13)     Patient has more history with this topic...    IMM DTaP/Tdap/Td Vaccine Next Due 3/3/2031      Done 3/3/2021 Imm Admin: Tdap Vaccine     Patient has more history with this topic...          Patient Care Team:  ONELIA Johnson as PCP - General (Family Medicine)  Mc Zarate, PT, DPT as Physical Therapist (Physical Therapy)    Current  medicines (including changes today)  Current Outpatient Medications   Medication Sig Dispense Refill   • Dulaglutide (TRULICITY) 1.5 MG/0.5ML Solution Pen-injector Inject 0.5 mL under the skin every 7 days. 2 mL 5   • levothyroxine (SYNTHROID) 75 MCG Tab Take 1 Tablet by mouth every morning on an empty stomach. 90 Tablet 3   • traZODone (DESYREL) 100 MG Tab Take 1 Tablet by mouth at bedtime as needed for Sleep. 90 Tablet 3   • CALCIUM PO Take 2 Tablets by mouth in the morning, at noon, and at bedtime.     • Prenatal Vit-Fe Fumarate-FA (PRENATAL PO) Take 1 tablet by mouth in the morning, at noon, and at bedtime.     • Cholecalciferol (D3 PO) Take 2 Capsules by mouth every day.     • Thiamine HCl (B-1 PO) Take 1 tablet by mouth every day.     • Ascorbic Acid (VITAMIN C) 1000 MG Tab Take 1,000 mg by mouth every day.     • Cyanocobalamin (VITAMIN B-12 PO) Take 1 tablet by mouth every day.     • ferrous sulfate 325 (65 Fe) MG tablet Take 325 mg by mouth every 7 days. On Friday     • COENZYME Q10 PO Take 1 capsule by mouth every day.     • metFORMIN ER (GLUCOPHAGE XR) 500 MG TABLET SR 24 HR Take 1 Tab by mouth 3 times a day, with meals. 270 Tab 1   • traZODone (DESYREL) 50 MG Tab TAKE 1 TABLET BY MOUTH EVERY NIGHT AT BEDTIME (Patient taking differently: Take 50 mg by mouth at bedtime.) 90 Tab 3   • glucose blood (ONE TOUCH ULTRA TEST) strip 1 Strip by Other route 3 times a day as needed. 100 Strip 2   • Blood Glucose Monitoring Suppl Device Meter: Dispense Device of Insurance Preference. Sig. Use as directed for blood sugar monitoring. #1. NR. 1 Device 0   • Lancets Lancets order: Lancets for One Touch Ultra meter. Sig: use once daily 100 Each 3   • pravastatin (PRAVACHOL) 40 MG tablet TAKE 1 TABLET BY MOUTH EVERY DAY (Patient not taking: Reported on 8/11/2021) 90 Tab 1     No current facility-administered medications for this visit.     She  has a past medical history of Allergy, Anesthesia, Anxiety, Diabetes (HCC), High  "cholesterol, Pain, PONV (postoperative nausea and vomiting), Psychiatric problem, and Thyroid disease.    ROS included above     Objective:     /70 (BP Location: Left arm)   Pulse 90   Temp 36.1 °C (96.9 °F)   Resp 16   Ht 1.626 m (5' 4\")   Wt 56.4 kg (124 lb 5.4 oz)   SpO2 96%  Body mass index is 21.34 kg/m².     Physical Exam:  General: Alert, oriented in no acute distress.  Eye contact is good, speech is normal, affect calm  HEENT: Oral mucosa pink moist, no lesions. TMs gray with good landmarks bilaterally. No lymphadenopathy.  Lungs: clear to auscultation bilaterally, normal effort, no wheeze/ rhonchi/ rales.  CV: regular rate and rhythm, S1, S2, no murmur   Pelvic: Right inferior labia with small polyp-like protrusion, no erythema or drainage, no induration.  No ulceration  Ext: no edema, color normal, vascularity normal, temperature normal    Assessment and Plan:   The following treatment plan was discussed   1. Acquired hypothyroidism   some concerns with low energy and mild depression recently.  We will recheck her labs  TSH    FREE THYROXINE    levothyroxine (SYNTHROID) 75 MCG Tab        2. Pure hypercholesterolemia   previously on pravastatin, discontinued back in June.  Reevaluate  Lipid Profile    Comp Metabolic Panel   4. Type 2 diabetes mellitus without complication, without long-term current use of insulin ([facility])   last A1c well controlled at 6.7  HEMOGLOBIN A1C    Dulaglutide (TRULICITY) 1.5 MG/0.5ML Solution Pen-injector   5. Iron deficiency anemia secondary to inadequate dietary iron intake   she is taking a daily prenatal vitamin with iron  CBC WITH DIFFERENTIAL    IRON/TOTAL IRON BIND    FERRITIN   6. S/P gastric bypass     7. Vaginal lesion   polyp-like protrusion on the inferior labia majora, possibly small cyst or just a skin tag.  Overall appears benign.  Per patient report its not growing or painful.  We will monitor for now   8. Hyperlipidemia due to type 2 diabetes mellitus " ([facility])   as discussed above   9. S/P lumbar spinal fusion   she has had a slow recovery and is still not yet back to her usual physical activity.  She feels that her lack of physical ability is impacting her mood somewhat.  She does take trazodone to help with sleep and would like to try an increased dose, I will send 100 mg daily.  If this does not seem sufficient for improving mood we may need to consider SSRI  She will be starting physical therapy at some point in the next few weeks       Followup: Pending labs         Please note that this dictation was created using voice recognition software. I have worked with consultants from the vendor as well as technical experts from [facility] to optimize the interface. I have made every reasonable attempt to correct obvious errors, but I expect that there are errors of grammar and possibly content that I did not discover before finalizing the note.

## 2021-08-11 NOTE — ASSESSMENT & PLAN NOTE
Chronic issue currently managed with levothyroxine 75 mcg daily.  Consistent with medication.  She is feeling some concerns with low energy and mild depression, unsure if this is related to her limitations from recent back surgery.  No weight changes, constipation, heat or cold intolerance

## 2021-08-11 NOTE — ASSESSMENT & PLAN NOTE
last A1c was well controlled at 6.7.  She is using Trulicity and Metformin both of which are working very well for her.  No polyuria, polydipsia, vision changes

## 2021-08-11 NOTE — ASSESSMENT & PLAN NOTE
Lumbar spinal fusion L5-S1 completed in June with Dr. Jing Motley.  She initially had some postop pain issues but this has gradually subsided.  At this time her pain is adequately controlled with just Tylenol.  She is wearing a back brace, still taking it very easy with her physical activity.  She will not be starting physical therapy for a while yet

## 2021-08-11 NOTE — ASSESSMENT & PLAN NOTE
She has had chronic iron deficiency following gastric bypass.  Currently taking a prenatal vitamin with iron

## 2021-08-13 ENCOUNTER — HOSPITAL ENCOUNTER (OUTPATIENT)
Dept: LAB | Facility: MEDICAL CENTER | Age: 66
End: 2021-08-13
Attending: NURSE PRACTITIONER
Payer: MEDICARE

## 2021-08-13 DIAGNOSIS — E03.9 ACQUIRED HYPOTHYROIDISM: ICD-10-CM

## 2021-08-13 DIAGNOSIS — D50.8 IRON DEFICIENCY ANEMIA SECONDARY TO INADEQUATE DIETARY IRON INTAKE: ICD-10-CM

## 2021-08-13 DIAGNOSIS — E78.00 PURE HYPERCHOLESTEROLEMIA: ICD-10-CM

## 2021-08-13 DIAGNOSIS — E11.9 TYPE 2 DIABETES MELLITUS WITHOUT COMPLICATION, WITHOUT LONG-TERM CURRENT USE OF INSULIN (HCC): ICD-10-CM

## 2021-08-13 LAB
ALBUMIN SERPL BCP-MCNC: 3.9 G/DL (ref 3.2–4.9)
ALBUMIN/GLOB SERPL: 1.1 G/DL
ALP SERPL-CCNC: 82 U/L (ref 30–99)
ALT SERPL-CCNC: 19 U/L (ref 2–50)
ANION GAP SERPL CALC-SCNC: 11 MMOL/L (ref 7–16)
AST SERPL-CCNC: 23 U/L (ref 12–45)
BASOPHILS # BLD AUTO: 1.3 % (ref 0–1.8)
BASOPHILS # BLD: 0.05 K/UL (ref 0–0.12)
BILIRUB SERPL-MCNC: 0.7 MG/DL (ref 0.1–1.5)
BUN SERPL-MCNC: 10 MG/DL (ref 8–22)
CALCIUM SERPL-MCNC: 9.6 MG/DL (ref 8.4–10.2)
CHLORIDE SERPL-SCNC: 96 MMOL/L (ref 96–112)
CHOLEST SERPL-MCNC: 180 MG/DL (ref 100–199)
CO2 SERPL-SCNC: 26 MMOL/L (ref 20–33)
CREAT SERPL-MCNC: 0.61 MG/DL (ref 0.5–1.4)
EOSINOPHIL # BLD AUTO: 0.07 K/UL (ref 0–0.51)
EOSINOPHIL NFR BLD: 1.8 % (ref 0–6.9)
ERYTHROCYTE [DISTWIDTH] IN BLOOD BY AUTOMATED COUNT: 47.5 FL (ref 35.9–50)
EST. AVERAGE GLUCOSE BLD GHB EST-MCNC: 134 MG/DL
FASTING STATUS PATIENT QL REPORTED: NORMAL
FERRITIN SERPL-MCNC: 75.1 NG/ML (ref 10–291)
GLOBULIN SER CALC-MCNC: 3.4 G/DL (ref 1.9–3.5)
GLUCOSE SERPL-MCNC: 121 MG/DL (ref 65–99)
HBA1C MFR BLD: 6.3 % (ref 4–5.6)
HCT VFR BLD AUTO: 38.6 % (ref 37–47)
HDLC SERPL-MCNC: 67 MG/DL
HGB BLD-MCNC: 12.6 G/DL (ref 12–16)
IMM GRANULOCYTES # BLD AUTO: 0.01 K/UL (ref 0–0.11)
IMM GRANULOCYTES NFR BLD AUTO: 0.3 % (ref 0–0.9)
IRON SATN MFR SERPL: 38 % (ref 15–55)
IRON SERPL-MCNC: 115 UG/DL (ref 40–170)
LDLC SERPL CALC-MCNC: 97 MG/DL
LYMPHOCYTES # BLD AUTO: 1.47 K/UL (ref 1–4.8)
LYMPHOCYTES NFR BLD: 37.2 % (ref 22–41)
MCH RBC QN AUTO: 30.9 PG (ref 27–33)
MCHC RBC AUTO-ENTMCNC: 32.6 G/DL (ref 33.6–35)
MCV RBC AUTO: 94.6 FL (ref 81.4–97.8)
MONOCYTES # BLD AUTO: 0.48 K/UL (ref 0–0.85)
MONOCYTES NFR BLD AUTO: 12.2 % (ref 0–13.4)
NEUTROPHILS # BLD AUTO: 1.87 K/UL (ref 2–7.15)
NEUTROPHILS NFR BLD: 47.2 % (ref 44–72)
NRBC # BLD AUTO: 0 K/UL
NRBC BLD-RTO: 0 /100 WBC
PLATELET # BLD AUTO: 237 K/UL (ref 164–446)
PMV BLD AUTO: 10.9 FL (ref 9–12.9)
POTASSIUM SERPL-SCNC: 4.3 MMOL/L (ref 3.6–5.5)
PROT SERPL-MCNC: 7.3 G/DL (ref 6–8.2)
RBC # BLD AUTO: 4.08 M/UL (ref 4.2–5.4)
SODIUM SERPL-SCNC: 133 MMOL/L (ref 135–145)
T4 FREE SERPL-MCNC: 1.33 NG/DL (ref 0.93–1.7)
TIBC SERPL-MCNC: 299 UG/DL (ref 250–450)
TRIGL SERPL-MCNC: 82 MG/DL (ref 0–149)
TSH SERPL DL<=0.005 MIU/L-ACNC: 1.44 UIU/ML (ref 0.38–5.33)
UIBC SERPL-MCNC: 184 UG/DL (ref 110–370)
WBC # BLD AUTO: 4 K/UL (ref 4.8–10.8)

## 2021-08-13 PROCEDURE — 84443 ASSAY THYROID STIM HORMONE: CPT

## 2021-08-13 PROCEDURE — 83036 HEMOGLOBIN GLYCOSYLATED A1C: CPT | Mod: GA

## 2021-08-13 PROCEDURE — 85025 COMPLETE CBC W/AUTO DIFF WBC: CPT

## 2021-08-13 PROCEDURE — 84439 ASSAY OF FREE THYROXINE: CPT

## 2021-08-13 PROCEDURE — 83550 IRON BINDING TEST: CPT

## 2021-08-13 PROCEDURE — 80053 COMPREHEN METABOLIC PANEL: CPT

## 2021-08-13 PROCEDURE — 80061 LIPID PANEL: CPT

## 2021-08-13 PROCEDURE — 82728 ASSAY OF FERRITIN: CPT

## 2021-08-13 PROCEDURE — 36415 COLL VENOUS BLD VENIPUNCTURE: CPT

## 2021-08-13 PROCEDURE — 83540 ASSAY OF IRON: CPT

## 2021-08-26 ENCOUNTER — PATIENT MESSAGE (OUTPATIENT)
Dept: MEDICAL GROUP | Facility: MEDICAL CENTER | Age: 66
End: 2021-08-26

## 2021-08-27 ENCOUNTER — PATIENT MESSAGE (OUTPATIENT)
Dept: MEDICAL GROUP | Facility: MEDICAL CENTER | Age: 66
End: 2021-08-27

## 2021-08-27 RX ORDER — GABAPENTIN 100 MG/1
100 CAPSULE ORAL 2 TIMES DAILY
Qty: 60 CAPSULE | Refills: 5 | Status: SHIPPED
Start: 2021-08-27 | End: 2021-12-15

## 2021-10-11 ENCOUNTER — PHYSICAL THERAPY (OUTPATIENT)
Dept: PHYSICAL THERAPY | Facility: MEDICAL CENTER | Age: 66
End: 2021-10-11
Attending: PHYSICIAN ASSISTANT
Payer: MEDICARE

## 2021-10-11 DIAGNOSIS — M48.062 LUMBAR STENOSIS WITH NEUROGENIC CLAUDICATION: ICD-10-CM

## 2021-10-11 PROCEDURE — 97110 THERAPEUTIC EXERCISES: CPT

## 2021-10-11 PROCEDURE — 97162 PT EVAL MOD COMPLEX 30 MIN: CPT

## 2021-10-11 ASSESSMENT — ENCOUNTER SYMPTOMS
QUALITY: RADIATING
QUALITY: ACHING
PAIN SCALE: 4

## 2021-10-11 NOTE — OP THERAPY EVALUATION
Outpatient Physical Therapy  INITIAL EVALUATION    [facility]  [address]  [place] [address]  Phone:  [phone]  Fax:  [phone]    Date of Evaluation: 10/11/2021    Patient: Concetta Tovar  YOB: 1955  MRN: [id number]     Referring Provider: Patrick Hooker P.A.-C.  [address] [facility]  [address]  [place] [address]   Referring Diagnosis Lumbar stenosis with neurogenic claudication [M48.062]     Time Calculation                 Chief Complaint: No chief complaint on file.    Visit Diagnoses     ICD-10-CM   1. Lumbar stenosis with neurogenic claudication  M48.062       Date of onset of impairment: 2021    Subjective   History of Present Illness:     Date of surgery:  2021    History of chief complaint:  Concetta is a pleasant 66 year old who is familiar to this clinic. She recently had low back surgery for L5-S1 decompression and fusion with Doctor Bullard (DOS 2021) From a surgical perspective she is doing quite well at this time.    She reports a weird nerve injruy, affecting both legs and into both ankles Back feels achey all the time. And she reports that the legs feel weak and she is not confident in them.     Pain:     Current pain ratin    Quality:  Aching and radiating    Aggravating factors:  Feels fatigued    Work part time but not going back     Sleep  Is varied and wakes achey    Getting up from the ground       Relieving factors:  1000mg tylenol at night and am    Muscle relaxer     Activity Tolerance:     Current activity tolerance / Recreational activities:  Walking 3 x week 1 hours    Eliptical elliptical x 15 mins     Legs     Work:  Not working right now but works part time for [employer] in [employer]     Social Support:     Lives in:  Multiple-level home (TOugh going up and down)    Diagnostic Tests:     X-ray: normal      view lumbar x-rays were reviewed by myself.  These were performed at the    [facility].  This shows evidence of instrumented fusion at L5-S1, there is   interbody device at L5-S1 as well.  There is mild scoliotic curvature   centered at L3-4.  This is less than 15 degrees.  There is no evidence of   screw pullout or hardware lucencies.  There is no abnormal motion between   flexion and extension maneuvers.  These are stable postoperative images.    Past Medical History:   Diagnosis Date   • Allergy    • Anesthesia    • Anxiety    • Diabetes (HCC)    • High cholesterol    • Pain     HIP AND BACK   • PONV (postoperative nausea and vomiting)    • Psychiatric problem     DEPRESSION AND ANXIETY   • Thyroid disease      Past Surgical History:   Procedure Laterality Date   • LUMBAR FUSION O-ARM  6/21/2021    Procedure: FUSION, SPINE, LUMBAR, WITH O-ARM IMAGING GUIDANCE - L5-S1.;  Surgeon: Germaine Bullard M.D.;  Location: SURGERY [facility];  Service: Neurosurgery   • ABDOMINAL EXPLORATION     • EYE SURGERY     • GASTRIC BYPASS LAPAROSCOPIC     • HERNIA REPAIR         Precautions:       Objective   Observation and functional movement:  Feel like she is gonna fall    Fall before surgery in June     She is able to complete all testing but is hesitant with leg strength        Range of motion and strength:    Active range of motion is within functional limits.    Strength is within functional limits.    Able to sit to stand without hands,    Tandem and single leg WNL    Lunge with good mechanics     Sensation and reflexes:     Sensation is intact.      Reflexes are normal and symmetrical.    Palpation and joint mobility:     No tenderness to palpation noted.    Joint mobility is normal.    Balance:     No balance deficits noted.    But reported she does feel a little off balance at times    Gait:      Normal pattern gait.    Coordination and tone:     Coordination is intact.    Tone is normal.    Basic self care and IADL's:     Independent with all self care.    Independent with all ADL's.    Cognition  and visual perception:     No cognition deficits noted.    No visual perception deficits noted.            Therapeutic Exercises (CPT 66148):     1. Develop HEP       Therapeutic Exercise Summary: Access Code: [email or web address]  URL: [email or web address]  Date: 10/11/2021  Prepared by: Mc Zarate    Exercises  Squat with Chair Touch - 1 x daily - 3-4 x weekly - 2 sets - 10 reps  Standing 'L' Stretch at Counter - 1 x daily - 3-4 x weekly - 1 sets - 10 reps  Standing Tandem Balance with Counter Support - 1 x daily - 3-4 x weekly - 4 sets - 10-15 sec hold  Heel Toe Raises with Counter Support - 1 x daily - 3-4 x weekly - 1 sets - 10 reps  Mini Lunge - 1 x daily - 3-4 x weekly - 2 sets - 5-7 reps        Time-based treatments/modalities:           Assessment, Response and Plan:   Impairments: activity intolerance, impaired physical strength, lacks appropriate home exercise program and pain with function    Assessment details:  Concetta is 16 weeks post decompression and laminectomy. She is doing well surgically but feels weak in the legs and is just now released from her back brace. She needs to load her legs progressively and get confident in return to bending, walking and all functional tasks.    Barriers to therapy:  None  Goals:   Short Term Goals:   1. Develop HEP  2. Patient able to go from standing to half kneeling on the floor and back without use of hands   3. Patient able to to tolerate gym 3 x week without increase pain  4. Patient able to hold mod plank on knees 20 secs   Short term goal time span:  2-4 weeks      Long Term Goals:    1. Update and progress HEP   2. Shows RMQ to reduce 10 points  3. Patient to report ability to walk/shop/work in the community for 2+ hours without increased back pain.   Long term goal time span:  4-6 weeks    Plan:   Therapy options:  Physical therapy treatment to continue  Planned therapy interventions:  E Stim Unattended (CPT 97002), Manual Therapy (CPT 23247),  Neuromuscular Re-education (CPT 77282) and Therapeutic Exercise (CPT 65119)  Frequency:  2x week  Duration in weeks:  6  Duration in visits:  12      Functional Assessment Used  Vega Mick Low Back Pain and Disability Score: 29.17     Referring provider co-signature:  I have reviewed this plan of care and my co-signature certifies the need for services.    Certification Period: 10/11/2021 to  11/08/21    Physician Signature: ________________________________ Date: ______________

## 2021-10-12 ENCOUNTER — HOSPITAL ENCOUNTER (OUTPATIENT)
Dept: RADIOLOGY | Facility: MEDICAL CENTER | Age: 66
End: 2021-10-12
Attending: NURSE PRACTITIONER
Payer: MEDICARE

## 2021-10-12 DIAGNOSIS — Z12.31 VISIT FOR SCREENING MAMMOGRAM: ICD-10-CM

## 2021-10-12 PROCEDURE — 77063 BREAST TOMOSYNTHESIS BI: CPT

## 2021-10-13 ENCOUNTER — PHYSICAL THERAPY (OUTPATIENT)
Dept: PHYSICAL THERAPY | Facility: MEDICAL CENTER | Age: 66
End: 2021-10-13
Attending: PHYSICIAN ASSISTANT
Payer: MEDICARE

## 2021-10-13 DIAGNOSIS — M48.062 LUMBAR STENOSIS WITH NEUROGENIC CLAUDICATION: ICD-10-CM

## 2021-10-13 PROCEDURE — 97110 THERAPEUTIC EXERCISES: CPT

## 2021-10-13 PROCEDURE — 97140 MANUAL THERAPY 1/> REGIONS: CPT

## 2021-10-13 NOTE — OP THERAPY DAILY TREATMENT
"  Outpatient Physical Therapy  DAILY TREATMENT     [facility]  [address]  [place] [address]  Phone:  [phone]  Fax:  [phone]    Date: 10/13/2021    Patient: Concetta Tovar  YOB: 1955  MRN: [id number]     Time Calculation    Start time: 1015  Stop time: 1100 Time Calculation (min): 45 minutes         Chief Complaint: Back Problem and Post-Op Pain    Visit #: 2    SUBJECTIVE:  Concetta is 16 weeks post decompression and laminectomy. She is doing well surgically but feels weak in the legs and is just now released from her back brace. She has been doing ok with no questions via HEP   OBJECTIVE:  Current objective measures:           Therapeutic Exercises (CPT 27065):     1. Nu step , Level 5 arms 8, , Level 5, 6 min mins     2. Standing stretches , Gastroc and hs     4. Half kneeling quad stretch , 2 air ex pads     5. 6\" small step , knee control drill     6. 12\" step , Large step up , 2 x 10 each side     8. Single leg balance and hip hinge drill , 2 x 5 eahc side       Therapeutic Exercise Summary: Exercises  Squat with Chair Touch - 1 x daily - 3-4 x weekly - 2 sets - 10 reps  Standing 'L' Stretch at Counter - 1 x daily - 3-4 x weekly - 1 sets - 10 reps  Standing Tandem Balance with Counter Support - 1 x daily - 3-4 x weekly - 4 sets - 10-15 sec hold  Heel Toe Raises with Counter Support - 1 x daily - 3-4 x weekly - 1 sets - 10 reps  Mini Lunge - 1 x daily - 3-4 x weekly - 2 sets - 5-7 reps    Therapeutic Treatments and Modalities:     1. Manual Therapy (CPT 05504), LE's, Range check and gentle stretch. Some fatigue/shake in the quads.     Time-based treatments/modalities:    Physical Therapy Timed Treatment Charges  Manual therapy minutes (CPT 46279): 15 minutes  Therapeutic exercise minutes (CPT 38591): 30 minutes      ASSESSMENT:   Response to treatment: R knee bothersome with pressure and step down tasks. Will " look at patella control next visit.     PLAN/RECOMMENDATIONS:   Plan for treatment: therapy treatment to continue next visit.  Planned interventions for next visit: continue with current treatment.

## 2021-10-18 ENCOUNTER — PHYSICAL THERAPY (OUTPATIENT)
Dept: PHYSICAL THERAPY | Facility: MEDICAL CENTER | Age: 66
End: 2021-10-18
Attending: PHYSICIAN ASSISTANT
Payer: MEDICARE

## 2021-10-18 DIAGNOSIS — M48.062 LUMBAR STENOSIS WITH NEUROGENIC CLAUDICATION: ICD-10-CM

## 2021-10-18 PROCEDURE — 97014 ELECTRIC STIMULATION THERAPY: CPT

## 2021-10-18 PROCEDURE — 97140 MANUAL THERAPY 1/> REGIONS: CPT

## 2021-10-18 PROCEDURE — 97110 THERAPEUTIC EXERCISES: CPT

## 2021-10-18 NOTE — OP THERAPY DAILY TREATMENT
Outpatient Physical Therapy  DAILY TREATMENT     [facility] Outpatient Physical Therapy  [address]  [place] [address]  Phone:  [phone]  Fax:  [phone]    Date: 10/18/2021    Patient: Concetta Tovar  YOB: 1955  MRN: [id number]     Time Calculation    Start time: 0800  Stop time: 0900 Time Calculation (min): 60 minutes         Chief Complaint: Back Problem and Post-Op Pain    Visit #: 3    SUBJECTIVE:  Concetta is decompression and laminectomy. She reports she is sore in the low back and incision area. She says she slept pretty poor.        OBJECTIVE:  Current objective measures:   DOS June 21          Therapeutic Exercises (CPT 27586):     1. Nu step , Level 5 arms 8, , Level 5, 6 min mins     2. Standing stretches , Gastroc and hs     4. Half kneeling quad stretch , 2 air ex pads     5. Supine foam roller bridge and trunk twist     6. Supine knee to chest     8. Standing banded side step , Red 17# band , 5-7 reps each side       Therapeutic Exercise Summary: Exercises  Squat with Chair Touch - 1 x daily - 3-4 x weekly - 2 sets - 10 reps  Standing 'L' Stretch at Counter - 1 x daily - 3-4 x weekly - 1 sets - 10 reps  Standing Tandem Balance with Counter Support - 1 x daily - 3-4 x weekly - 4 sets - 10-15 sec hold  Heel Toe Raises with Counter Support - 1 x daily - 3-4 x weekly - 1 sets - 10 reps  Mini Lunge - 1 x daily - 3-4 x weekly - 2 sets - 5-7 reps    Therapeutic Treatments and Modalities:     1. Manual Therapy (CPT 60692), LE's, Prone laying. IASTM gentle over sacrum and hip. Gentel firm pressure     2. E Stim Unattended (CPT 53669), Prone IFC and heat to low back     Time-based treatments/modalities:    Physical Therapy Timed Treatment Charges  Manual therapy minutes (CPT 72383): 10 minutes  Therapeutic exercise minutes (CPT 62416): 35 minutes      ASSESSMENT:   Response to treatment: She reports she feels better after some exercises  and movement. She may have overdone it yesterday with shopping and some poor sleep last night. Will keep progressing as tolerated   PLAN/RECOMMENDATIONS:   Plan for treatment: therapy treatment to continue next visit.  Planned interventions for next visit: continue with current treatment.

## 2021-10-20 ENCOUNTER — PHYSICAL THERAPY (OUTPATIENT)
Dept: PHYSICAL THERAPY | Facility: MEDICAL CENTER | Age: 66
End: 2021-10-20
Attending: PHYSICIAN ASSISTANT
Payer: MEDICARE

## 2021-10-20 DIAGNOSIS — M48.062 LUMBAR STENOSIS WITH NEUROGENIC CLAUDICATION: ICD-10-CM

## 2021-10-20 PROCEDURE — 97014 ELECTRIC STIMULATION THERAPY: CPT

## 2021-10-20 PROCEDURE — 97110 THERAPEUTIC EXERCISES: CPT

## 2021-10-20 NOTE — OP THERAPY DAILY TREATMENT
Outpatient Physical Therapy  DAILY TREATMENT     [facility] Outpatient Physical Therapy  [address]  [place] [address]  Phone:  [phone]  Fax:  [phone]    Date: 10/20/2021    Patient: Concetta Tovar  YOB: 1955  MRN: [id number]     Time Calculation    Start time: 0845  Stop time: 0945 Time Calculation (min): 60 minutes         Chief Complaint: Back Problem    Visit #: 4    SUBJECTIVE:  Concetta is decompression and laminectomy. She is better today. Slept better and did some stretches in the am which she feels helped.    OBJECTIVE:  Current objective measures:   DOS June 21          Therapeutic Exercises (CPT 94868):     1. Nu step , Level 5 arms 8, , Level 5, 6 min mins     2. Standing stretches , Gastroc and hs     4. Goblet squat to target , Goblet squat with weight     5. Shuttle work , 15 deg incline. double and single leg work , 2,3,4 bands     7. Banded toy soldier , Pink band given for home       Therapeutic Exercise Summary: Exercises Given from initial eval     Squat with Chair Touch - 1 x daily - 3-4 x weekly - 2 sets - 10 reps  Standing 'L' Stretch at Counter - 1 x daily - 3-4 x weekly - 1 sets - 10 reps  Standing Tandem Balance with Counter Support - 1 x daily - 3-4 x weekly - 4 sets - 10-15 sec hold  Heel Toe Raises with Counter Support - 1 x daily - 3-4 x weekly - 1 sets - 10 reps  Mini Lunge - 1 x daily - 3-4 x weekly - 2 sets - 5-7 reps    10/20 added banded toy solider     Therapeutic Treatments and Modalities:     1. Neuromuscular Re-education (CPT 28521), Tandem and standing balance work via foam beam    2. E Stim Unattended (CPT 29679), Prone IFC and heat to low back     Time-based treatments/modalities:    Physical Therapy Timed Treatment Charges  Manual therapy minutes (CPT 54152): 10 minutes  Therapeutic exercise minutes (CPT 42055): 40 minutes      ASSESSMENT:   Response to treatment: Better track to today with  movement tolerance. Keep progressively loading legs and patterns for hinge and squat.     PLAN/RECOMMENDATIONS:   Plan for treatment: therapy treatment to continue next visit.  Planned interventions for next visit: continue with current treatment.

## 2021-10-25 ENCOUNTER — PHYSICAL THERAPY (OUTPATIENT)
Dept: PHYSICAL THERAPY | Facility: MEDICAL CENTER | Age: 66
End: 2021-10-25
Attending: PHYSICIAN ASSISTANT
Payer: MEDICARE

## 2021-10-25 DIAGNOSIS — M48.062 LUMBAR STENOSIS WITH NEUROGENIC CLAUDICATION: ICD-10-CM

## 2021-10-25 PROCEDURE — 97140 MANUAL THERAPY 1/> REGIONS: CPT | Mod: XU

## 2021-10-25 PROCEDURE — 97110 THERAPEUTIC EXERCISES: CPT

## 2021-10-25 PROCEDURE — 97012 MECHANICAL TRACTION THERAPY: CPT

## 2021-10-25 NOTE — OP THERAPY DAILY TREATMENT
Outpatient Physical Therapy  DAILY TREATMENT     [facility] Outpatient Physical Therapy  [address]  [place] [address]  Phone:  [phone]  Fax:  [phone]    Date: 10/25/2021    Patient: Concetta Tovar  YOB: 1955  MRN: [id number]     Time Calculation    Start time: 0845  Stop time: 0945 Time Calculation (min): 60 minutes         Chief Complaint: Back Problem and Post-Op Pain    Visit #: 5    SUBJECTIVE:  Concetta is decompression and laminectomy. She is sore. She picked up leaves Saturday and then received her booster on Saturday pm and spent Sunday tony down and rested post booster  .   OBJECTIVE:  Current objective measures:   DOS June 21          Therapeutic Exercises (CPT 57492):     1. Nu step , Level 5 arms 8, , Level 5, 6 min mins     2. Standing stretches , Gastroc and hs     4. Low body hip stertch groin focus, Standing, sitting , R side tough with groin and low back     6. Lower body mod yoga flow, Half kneling, t spine rotation, hip flexor balance, half split, pigeon stretch , Both side     7. Standing squat with lower body hang       Therapeutic Exercise Summary: Exercises Given from initial eval     Squat with Chair Touch - 1 x daily - 3-4 x weekly - 2 sets - 10 reps  Standing 'L' Stretch at Counter - 1 x daily - 3-4 x weekly - 1 sets - 10 reps  Standing Tandem Balance with Counter Support - 1 x daily - 3-4 x weekly - 4 sets - 10-15 sec hold  Heel Toe Raises with Counter Support - 1 x daily - 3-4 x weekly - 1 sets - 10 reps  Mini Lunge - 1 x daily - 3-4 x weekly - 2 sets - 5-7 reps    10/20 added banded toy solider     Therapeutic Treatments and Modalities:     1. Manual Therapy (CPT 20549), Prone press up with overpressure, R hip ER with pin and stretch to R SI     2. Mechanical Traction (CPT 99810), Low back with mHP , 80/60    Time-based treatments/modalities:    Physical Therapy Timed Treatment Charges  Manual therapy minutes  (CPT 07925): 15 minutes  Therapeutic exercise minutes (CPT 95767): 25 minutes      ASSESSMENT:   Response to treatment: She is trying to be active She feels sore and tightness in R SI noticeable in am tasks with bending and some stretching. Hopefully this will resolve with time and more hip strengthening going forward    PLAN/RECOMMENDATIONS:   Plan for treatment: therapy treatment to continue next visit.  Planned interventions for next visit: continue with current treatment.

## 2021-10-27 ENCOUNTER — PHYSICAL THERAPY (OUTPATIENT)
Dept: PHYSICAL THERAPY | Facility: MEDICAL CENTER | Age: 66
End: 2021-10-27
Attending: PHYSICIAN ASSISTANT
Payer: MEDICARE

## 2021-10-27 DIAGNOSIS — M48.062 LUMBAR STENOSIS WITH NEUROGENIC CLAUDICATION: ICD-10-CM

## 2021-10-27 PROCEDURE — 97014 ELECTRIC STIMULATION THERAPY: CPT

## 2021-10-27 PROCEDURE — 97140 MANUAL THERAPY 1/> REGIONS: CPT

## 2021-10-27 PROCEDURE — 97110 THERAPEUTIC EXERCISES: CPT

## 2021-10-27 NOTE — OP THERAPY DAILY TREATMENT
Outpatient Physical Therapy  DAILY TREATMENT     [facility] Outpatient Physical Therapy  [address]  [place] [address]  Phone:  [phone]  Fax:  [phone]    Date: 10/27/2021    Patient: Concetta Tovar  YOB: 1955  MRN: [id number]     Time Calculation    Start time: 0845  Stop time: 0945 Time Calculation (min): 60 minutes         Chief Complaint: Back Problem and Post-Op Pain    Visit #: 6    SUBJECTIVE:  Concetta is decompression and laminectomy. She is sore. She picked up leaves Saturday and then received her booster on Saturday pm and spent Sunday tony down and rested post booster  .   OBJECTIVE:  Current objective measures:   DOS June 21          Therapeutic Exercises (CPT 03249):     1. Upright bike , Seat 1 hole showing , Level 2 x 5 mins     2. Standing stretches , Gastroc and hs     4. Single leg balance to [place] cones, Hard to stand on R leg    5. Bungee work, backwards walking , 1 and 2 bands, trouble hinging back , Lateral walking , 1 band 5 reps each way      Therapeutic Exercise Summary: Exercises Given from initial eval     Squat with Chair Touch - 1 x daily - 3-4 x weekly - 2 sets - 10 reps  Standing 'L' Stretch at Counter - 1 x daily - 3-4 x weekly - 1 sets - 10 reps  Standing Tandem Balance with Counter Support - 1 x daily - 3-4 x weekly - 4 sets - 10-15 sec hold  Heel Toe Raises with Counter Support - 1 x daily - 3-4 x weekly - 1 sets - 10 reps  Mini Lunge - 1 x daily - 3-4 x weekly - 2 sets - 5-7 reps    10/20 added banded toy solider     Therapeutic Treatments and Modalities:     1. Manual Therapy (CPT 56701), Supine belted hip mob inferior, push/pull contract relax work     2. E Stim Unattended (CPT 51056), Low back with mHP , IFC and heat to R side low back     Time-based treatments/modalities:    Physical Therapy Timed Treatment Charges  Manual therapy minutes (CPT 53331): 15 minutes  Therapeutic exercise minutes (CPT  40404): 30 minutes      ASSESSMENT:   Response to treatment: She reports feeling better after walking. Some pain with slump sitting into low couch. She will travel this weekend and needs to take breaks from driving.     PLAN/RECOMMENDATIONS:   Plan for treatment: therapy treatment to continue next visit.  Planned interventions for next visit: continue with current treatment.

## 2021-11-02 ENCOUNTER — APPOINTMENT (OUTPATIENT)
Dept: PHYSICAL THERAPY | Facility: MEDICAL CENTER | Age: 66
End: 2021-11-02
Attending: PHYSICIAN ASSISTANT
Payer: MEDICARE

## 2021-11-04 ENCOUNTER — PHYSICAL THERAPY (OUTPATIENT)
Dept: PHYSICAL THERAPY | Facility: MEDICAL CENTER | Age: 66
End: 2021-11-04
Attending: PHYSICIAN ASSISTANT
Payer: MEDICARE

## 2021-11-04 DIAGNOSIS — M48.062 LUMBAR STENOSIS WITH NEUROGENIC CLAUDICATION: ICD-10-CM

## 2021-11-04 PROCEDURE — 97014 ELECTRIC STIMULATION THERAPY: CPT

## 2021-11-04 PROCEDURE — 97110 THERAPEUTIC EXERCISES: CPT

## 2021-11-04 NOTE — OP THERAPY DAILY TREATMENT
"  Outpatient Physical Therapy  DAILY TREATMENT     [facility] Outpatient Physical Therapy  [address]  [place] [address]  Phone:  [phone]  Fax:  [phone]    Date: 11/04/2021    Patient: Concetta Tovar  YOB: 1955  MRN: [id number]     Time Calculation    Start time: 0815  Stop time: 0915 Time Calculation (min): 60 minutes         Chief Complaint: Back Problem and Hip Problem    Visit #: 7    SUBJECTIVE:  Concetta is seen for follow up for low back surgery. Able to tolerate long drive hour but bed was different and she work sore. She was able to tolerate walking around without any set backs  .   OBJECTIVE:  Current objective measures:   DOS June 21          Therapeutic Exercises (CPT 21572):     1. Upright bike , Seat 1 hole showing , Level 2 x 5 mins     2. Standing stretches , Gastroc and hs     4. Yoga flow , Down dog to oppo toe touch, Half kneeling hip flexor push, Down dog to pigeon    8. Blue band hip flexor march , blue band x 1 and 2 , with guarding     9. Avitia carry , Double hand , 10# Feels some \"pinch\" ache in the back    11. Education regarding pelvic controll, sleep habits and movement upon first waking.       Therapeutic Exercise Summary: Exercises Given from initial eval     Squat with Chair Touch - 1 x daily - 3-4 x weekly - 2 sets - 10 reps  Standing 'L' Stretch at Counter - 1 x daily - 3-4 x weekly - 1 sets - 10 reps  Standing Tandem Balance with Counter Support - 1 x daily - 3-4 x weekly - 4 sets - 10-15 sec hold  Heel Toe Raises with Counter Support - 1 x daily - 3-4 x weekly - 1 sets - 10 reps  Mini Lunge - 1 x daily - 3-4 x weekly - 2 sets - 5-7 reps    10/20 added banded toy solider     Therapeutic Treatments and Modalities:     1. Manual Therapy (CPT 91766), Supine belted hip mob inferior, push/pull contract relax work     2. E Stim Unattended (CPT 91857), Low back with mHP , IFC and heat to R side low back "     Time-based treatments/modalities:    Physical Therapy Timed Treatment Charges  Therapeutic exercise minutes (CPT 59921): 45 minutes      ASSESSMENT:   Response to treatment: She is doing well but still feels some discomfort in the back with various exercise and loading. She has a follow up with her surgeon in a few week. Likely just tissue still healing and normal part of process.     PLAN/RECOMMENDATIONS:   Plan for treatment: therapy treatment to continue next visit.  Planned interventions for next visit: continue with current treatment.

## 2021-11-08 ENCOUNTER — PHYSICAL THERAPY (OUTPATIENT)
Dept: PHYSICAL THERAPY | Facility: MEDICAL CENTER | Age: 66
End: 2021-11-08
Attending: PHYSICIAN ASSISTANT
Payer: MEDICARE

## 2021-11-08 DIAGNOSIS — M48.062 LUMBAR STENOSIS WITH NEUROGENIC CLAUDICATION: ICD-10-CM

## 2021-11-08 PROCEDURE — 97110 THERAPEUTIC EXERCISES: CPT

## 2021-11-08 NOTE — OP THERAPY DAILY TREATMENT
Outpatient Physical Therapy  DAILY TREATMENT     [facility] Outpatient Physical Therapy  [address]  [place] [address]  Phone:  [phone]  Fax:  [phone]    Date: 11/08/2021    Patient: Concetta Tovar  YOB: 1955  MRN: [id number]     Time Calculation    Start time: 1305  Stop time: 1400 Time Calculation (min): 55 minutes         Chief Complaint: Back Problem and Post-Op Pain    Visit #: 8    SUBJECTIVE:  Concetta is seen for follow up for low back surgery. She is tired from watching the grand kids and the time change. Just body fatigue and low back fatigue, not really pain.     OBJECTIVE:  Current objective measures:   DOS June 21          Therapeutic Exercises (CPT 05059):     1. Upright bike , Seat 1 hole showing , Level 2 x 5 mins     2. Standing stretches , Gastroc and hs     5. Am stretches exercises, Supine Bridge (single and double leg), HS stretch, piriformis stretch, Hip hike       Therapeutic Exercise Summary: Exercises Given from initial eval     Squat with Chair Touch - 1 x daily - 3-4 x weekly - 2 sets - 10 reps  Standing 'L' Stretch at Counter - 1 x daily - 3-4 x weekly - 1 sets - 10 reps  Standing Tandem Balance with Counter Support - 1 x daily - 3-4 x weekly - 4 sets - 10-15 sec hold  Heel Toe Raises with Counter Support - 1 x daily - 3-4 x weekly - 1 sets - 10 reps  Mini Lunge - 1 x daily - 3-4 x weekly - 2 sets - 5-7 reps    10/20 added banded toy solider     Access Code: [email or web address]  URL: [email or web address]  Date: 11/08/2021  Prepared by: Mc Zarate    Exercises  Supine Bridge - 1-2 x daily - 5 x weekly - 1 sets - 10 reps  Supine Hamstring Stretch - 1-2 x daily - 5 x weekly - 1 sets - 10 reps  Supine Piriformis Stretch with Foot on Ground - 1-2 x daily - 5 x weekly - 2 sets - 15 sec hold  Supine Hip Hike - 1-2 x daily - 5 x weekly - 1 sets - 10 reps        Therapeutic Treatments and Modalities:     1. Neuromuscular  Re-education (CPT 09666), Balance testing , Mini best 28/28    2. E Stim Unattended (CPT 33799), Low back with mHP , IFC and heat to R side low back     Time-based treatments/modalities:    Physical Therapy Timed Treatment Charges  Therapeutic exercise minutes (CPT 17743): 45 minutes      ASSESSMENT:   Response to treatment: She does well to tolerate exercises but reports feeling a fatigue in the back. It seems likely that she is still healing and her tissues are still not tolerant to upright loading during walking ,various carry's, holds and hinging.     PLAN/RECOMMENDATIONS:   Plan for treatment: therapy treatment to continue next visit.  Planned interventions for next visit: continue with current treatment.

## 2021-11-08 NOTE — OP THERAPY PROGRESS SUMMARY
"  Outpatient Physical Therapy  PROGRESS SUMMARY NOTE      [facility] Outpatient Physical Therapy  [address]  [place] [address]  Phone:  [phone]  Fax:  [phone]    Date of Visit: 11/08/2021    Patient: Concetta Tovar  YOB: 1955  MRN: [id number]     Referring Provider: Patrick Hooker P.A.-C.  [address]  [address]  [place] [address]   Referring Diagnosis Spinal stenosis, lumbar region with neurogenic claudication [M48.062]     Visit Diagnoses     ICD-10-CM   1. Lumbar stenosis with neurogenic claudication  M48.062       Rehab Potential: good    Progress Report Period: 1011/021-11/8/21    Functional Assessment Used  Vega Mick Low Back Pain and Disability Score: 25       Objective Findings and Assessment:   Patient progression towards goals: Concetta is post op decompression and laminectomy to low back. At initial evaluation and early rehab she was still wearing and weaning of her brace and hesitant to move. She has done well to tolerate more upright tasks, walking etc. She reported some weakness feeling in the legs and some vague off balance feeling. She shows good testing towards formal balance test. She shows good task specific ability in the clinic. At time she gets some pull and pinch in the R SI with various hinge and squat tasks. She still shows some endurance related weakness and a \"fatigue\" which seems more like tissue endurance fatigue.  She needs to load her legs progressively and get confident in return to bending, walking and all functional tasks. We will continue with therapy services to help address this going forward.        Short Term Goals:   1. Develop HEP GOAL MET  2. Patient able to go from standing to half kneeling on the floor and back without use of hands GOAL MET   3. Patient able to to tolerate gym 3 x week without increase pain GOAL NOT MET  4. Patient able to hold mod plank on knees 20 secs GOAL MET  Short " term goal time span:  2-4 weeks      Long Term Goals:    1. Update and progress HEP GOAL MET  2. Shows RMQ to reduce 10 points GOAL NOT MET (5 points in reporting period)  3. Patient to report ability to walk/shop/work in the community for 2+ hours without increased back pain. GOAL NOT MET      Objective findings and assessment details: Pain general 3-4/10  Only able to make it to the gym/walking 1-2 x week  Only able to drive/sit for 30 min before discomfort.      At eval 10/11  Vega Mick Low Back Pain and Disability Score: 29.17     At prog 11/8  Vega Mick Low Back Pain and Disability Score: 25      Goals:   Short Term Goals:   1. Patient to show improved endurance (back) in order to walk 2+ hour in community for shopping/errands  2. Patient to show improved leg strength in order to lift garden supplies/dirt without increased pain  3. Patient able to sit for 30 mins without increase need to move or pain to improve tolerance for driving, sitting work tasks  Short term goal time span:  2-4 weeks      Long Term Goals:    1. Update and progress HEP  2. Shows RMQ to reduce to below 20 points.   Long term goal time span:  4-6 weeks    Plan:   Planned therapy interventions:  E Stim Unattended (CPT 16095), Manual Therapy (CPT 29555), Neuromuscular Re-education (CPT 37195) and Therapeutic Exercise (CPT 33322)  Frequency:  2x week  Duration in weeks:  4  Duration in visits:  8      Referring provider co-signature:  I have reviewed this plan of care and my co-signature certifies the need for services.     Certification Period: 11/08/2021 to 12/14/21    Physician Signature: ________________________________ Date: ______________

## 2021-11-10 ENCOUNTER — PHYSICAL THERAPY (OUTPATIENT)
Dept: PHYSICAL THERAPY | Facility: MEDICAL CENTER | Age: 66
End: 2021-11-10
Attending: PHYSICIAN ASSISTANT
Payer: MEDICARE

## 2021-11-10 DIAGNOSIS — M48.062 LUMBAR STENOSIS WITH NEUROGENIC CLAUDICATION: ICD-10-CM

## 2021-11-10 PROCEDURE — 97014 ELECTRIC STIMULATION THERAPY: CPT

## 2021-11-10 PROCEDURE — 97110 THERAPEUTIC EXERCISES: CPT

## 2021-11-10 NOTE — OP THERAPY DAILY TREATMENT
"  Outpatient Physical Therapy  DAILY TREATMENT     [facility] Outpatient Physical Therapy  [address]  [place] [address]  Phone:  [phone]  Fax:  [phone]    Date: 11/10/2021    Patient: Concetta Tovar  YOB: 1955  MRN: [id number]     Time Calculation    Start time: 0845  Stop time: 0945 Time Calculation (min): 60 minutes         Chief Complaint: Back Problem    Visit #: 9    SUBJECTIVE:  Concetta is seen for follow up for low back surgery. She had a low activity day yesterday but did a lot      OBJECTIVE:  Current objective measures:             Therapeutic Exercises (CPT 75568):     1. Upright bike , Seat 1 hole showing , Level 2 x 5 mins     2. Standing stretches , Gastroc and hs     4. Squat to target, 14\" targer 7 reps x 3 sets     5. Superset with farmer carry 200 feet (100 each hand)    7. 30# ball leg push , 25 feet 4 direction , for balance, strength and endurance     9. More standing stretch, education regarding community exercise options     Therapeutic Treatments and Modalities:     2. E Stim Unattended (CPT 20795), Low back with mHP , IFC and heat to R side low back     Time-based treatments/modalities:    Physical Therapy Timed Treatment Charges  Therapeutic exercise minutes (CPT 98044): 40 minutes      ASSESSMENT:   Response to treatment: She did well today with more strength focus she does note some soreness post exercise. She is lacking some confidence with her ability to hinge and brace  PLAN/RECOMMENDATIONS:   Plan for treatment: therapy treatment to continue next visit.  Planned interventions for next visit: continue with current treatment.       "

## 2021-11-15 ENCOUNTER — PHYSICAL THERAPY (OUTPATIENT)
Dept: PHYSICAL THERAPY | Facility: MEDICAL CENTER | Age: 66
End: 2021-11-15
Attending: PHYSICIAN ASSISTANT
Payer: MEDICARE

## 2021-11-15 DIAGNOSIS — M48.062 LUMBAR STENOSIS WITH NEUROGENIC CLAUDICATION: ICD-10-CM

## 2021-11-15 PROCEDURE — 97140 MANUAL THERAPY 1/> REGIONS: CPT

## 2021-11-15 PROCEDURE — 97014 ELECTRIC STIMULATION THERAPY: CPT

## 2021-11-15 PROCEDURE — 97110 THERAPEUTIC EXERCISES: CPT

## 2021-11-15 NOTE — OP THERAPY DAILY TREATMENT
Outpatient Physical Therapy  DAILY TREATMENT     [facility] Outpatient Physical Therapy  [address]  [place] [address]  Phone:  [phone]  Fax:  [phone]    Date: 11/15/2021    Patient: Concetta Tovar  YOB: 1955  MRN: [id number]     Time Calculation    Start time: 1300  Stop time: 1400 Time Calculation (min): 60 minutes         Chief Complaint: Back Problem and Post-Op Pain    Visit #: 10    SUBJECTIVE:  Concetta is seen for follow up for low back surgery. She had inconsistent sleep the last couple of days     OBJECTIVE:  Current objective measures:             Therapeutic Exercises (CPT 95325):     1. Upright bike , Seat 1 hole showing , Level 2 x 5 mins     2. Standing stretches , Gastroc and hs     3. True     4. 4 way sport cord     5. Banded high to low chop, 12#, 10 reps each side    6. Stnading pull down with march , 2 x 10     Therapeutic Treatments and Modalities:     1. Manual Therapy (CPT 54695), LE's, Manual pull of legs, Stretching hs, glute, groin     Time-based treatments/modalities:    Physical Therapy Timed Treatment Charges  Manual therapy minutes (CPT 40371): 15 minutes  Therapeutic exercise minutes (CPT 09516): 30 minutes      ASSESSMENT:   Response to treatment: She did very well today. One of the better days of movement and less pain.  PLAN/RECOMMENDATIONS:   Plan for treatment: therapy treatment to continue next visit.  Planned interventions for next visit: continue with current treatment.

## 2021-11-17 ENCOUNTER — PHYSICAL THERAPY (OUTPATIENT)
Dept: PHYSICAL THERAPY | Facility: MEDICAL CENTER | Age: 66
End: 2021-11-17
Attending: PHYSICIAN ASSISTANT
Payer: MEDICARE

## 2021-11-17 DIAGNOSIS — M48.062 LUMBAR STENOSIS WITH NEUROGENIC CLAUDICATION: ICD-10-CM

## 2021-11-17 PROCEDURE — 97140 MANUAL THERAPY 1/> REGIONS: CPT

## 2021-11-17 PROCEDURE — 97110 THERAPEUTIC EXERCISES: CPT

## 2021-11-17 NOTE — OP THERAPY DAILY TREATMENT
Outpatient Physical Therapy  DAILY TREATMENT     [facility] Outpatient Physical Therapy  [address]  [place] [address]  Phone:  [phone]  Fax:  [phone]    Date: 11/17/2021    Patient: Concetta Tovar  YOB: 1955  MRN: [id number]     Time Calculation    Start time: 1303  Stop time: 1345 Time Calculation (min): 42 minutes         Chief Complaint: Back Problem, Post-Op Pain, and Difficulty Walking    Visit #: 11    SUBJECTIVE:  Concetta is seen for follow up for low back surgery. She has been more consistent this week with exercise and walking. She reports no set backs in her condition at this time.     OBJECTIVE:  Current objective measures:             Therapeutic Exercises (CPT 76144):     1. Upright bike , Seat 1 hole showing , Level 2 x 5 mins for strength     2. Standing stretches , Gastroc and hs , 10 reps each leg     4. Box push 2 directions , Increasing weight , 3 sets empty/40# x 15 feet    5. Single arm farmer carry, 100 feet x 3 rounds on each hand , 10#/15#/20#     Therapeutic Treatments and Modalities:     1. Manual Therapy (CPT 22435), LE's, Manual pull of legs, Stretching hs, glute, groin     Time-based treatments/modalities:    Physical Therapy Timed Treatment Charges  Manual therapy minutes (CPT 60756): 20 minutes  Therapeutic exercise minutes (CPT 38568): 25 minutes      ASSESSMENT:   Response to treatment: She continues to show good strength but with some discomfort in the low back with some lifting of legs. She will see her surgeon to follow up. Possible skin/scar tightness or continued healing tissues not tolerating certain loads at this time.      PLAN/RECOMMENDATIONS:   Plan for treatment: therapy treatment to continue next visit.  Planned interventions for next visit: continue with current treatment.

## 2021-11-22 ENCOUNTER — PHYSICAL THERAPY (OUTPATIENT)
Dept: PHYSICAL THERAPY | Facility: MEDICAL CENTER | Age: 66
End: 2021-11-22
Attending: PHYSICIAN ASSISTANT
Payer: MEDICARE

## 2021-11-22 DIAGNOSIS — M48.062 LUMBAR STENOSIS WITH NEUROGENIC CLAUDICATION: ICD-10-CM

## 2021-11-22 PROCEDURE — 97014 ELECTRIC STIMULATION THERAPY: CPT

## 2021-11-22 PROCEDURE — 97110 THERAPEUTIC EXERCISES: CPT

## 2021-11-22 NOTE — OP THERAPY DAILY TREATMENT
"  Outpatient Physical Therapy  DAILY TREATMENT     [facility] Outpatient Physical Therapy  [address]  [place] [address]  Phone:  [phone]  Fax:  [phone]    Date: 11/22/2021    Patient: Concetta Tovar  YOB: 1955  MRN: [id number]     Time Calculation    Start time: 1245  Stop time: 1330 Time Calculation (min): 45 minutes         Chief Complaint: Back Problem, Post-Op Pain, Difficulty Walking, and Hip Problem    Visit #: 12    SUBJECTIVE:  Concetta is seen for follow up for low back surgery. She comes in early as she has to  her son from the airport. She is feeling good and is excited to hang with her family.   OBJECTIVE:  Current objective measures:             Therapeutic Exercises (CPT 36331):     1. Upright bike , Seat 1 hole showing , Level 2 x 5 mins for strength     2. Standing stretches , Gastroc and hs , 10 reps each leg     4. Famer carry, 3 x 40 feet on each side , #20     5. Side ankle squat to 6\" step , 10 reps x 20# cues for hip hinge then knees     6. KB deadlift , Step 6\" and 20# x 20 reps     Therapeutic Treatments and Modalities:     1. Manual Therapy (CPT 24328), LE's, Supine glute med peel, contract relax glute med, groin     2. E Stim Unattended (CPT 00963), IFC and heat to low back , 15 min with MHP    Time-based treatments/modalities:    Physical Therapy Timed Treatment Charges  Manual therapy minutes (CPT 13180): 10 minutes  Therapeutic exercise minutes (CPT 52715): 25 minutes      ASSESSMENT:   Response to treatment:  She still gets sore with R knee end range flexion and end range. She is getting stronger and more confident in her hinge and standing bracing. Continue with more advanced leg and back carries.      PLAN/RECOMMENDATIONS:   Plan for treatment: therapy treatment to continue next visit.  Planned interventions for next visit: continue with current treatment.       "

## 2021-11-24 ENCOUNTER — PHYSICAL THERAPY (OUTPATIENT)
Dept: PHYSICAL THERAPY | Facility: MEDICAL CENTER | Age: 66
End: 2021-11-24
Attending: PHYSICIAN ASSISTANT
Payer: MEDICARE

## 2021-11-24 DIAGNOSIS — M48.062 LUMBAR STENOSIS WITH NEUROGENIC CLAUDICATION: ICD-10-CM

## 2021-11-24 PROCEDURE — 97110 THERAPEUTIC EXERCISES: CPT

## 2021-11-24 PROCEDURE — 97140 MANUAL THERAPY 1/> REGIONS: CPT

## 2021-11-29 ENCOUNTER — PHYSICAL THERAPY (OUTPATIENT)
Dept: PHYSICAL THERAPY | Facility: MEDICAL CENTER | Age: 66
End: 2021-11-29
Attending: PHYSICIAN ASSISTANT
Payer: MEDICARE

## 2021-11-29 DIAGNOSIS — M48.062 LUMBAR STENOSIS WITH NEUROGENIC CLAUDICATION: ICD-10-CM

## 2021-11-29 PROCEDURE — 97140 MANUAL THERAPY 1/> REGIONS: CPT

## 2021-11-29 PROCEDURE — 97110 THERAPEUTIC EXERCISES: CPT

## 2021-11-29 NOTE — OP THERAPY DAILY TREATMENT
Outpatient Physical Therapy  DAILY TREATMENT     [facility] Outpatient Physical Therapy  [address]  [place] [address]  Phone:  [phone]  Fax:  [phone]    Date: 11/29/2021    Patient: Concetta Tovar  YOB: 1955  MRN: [id number]     Time Calculation    Start time: 1015  Stop time: 1115 Time Calculation (min): 60 minutes         Chief Complaint: Back Problem and Post-Op Pain    Visit #: 14    SUBJECTIVE:  Concetta is seen for follow up for low back surgery.She had her kids and grand kids over the weekend and is tired. She felt good after last session with movement and cupping as she had some tightness in the low back  OBJECTIVE:  Current objective measures:             Therapeutic Exercises (CPT 40978):     1. Upright bike , Seat 1 hole showing , Level 2 x 5 mins for strength     2. Standing stretches , Gastroc and hs , 10 reps each leg     4. Seated on floor, Shin box, good bilateral sitting abiliy, IR of leg post, x 5 reps     5. Seated core work , Seated walk out, brdige and walk back, 5 reps each     Therapeutic Treatments and Modalities:     1. Manual Therapy (CPT 63188), Prone, Cupping to scar  and clive scar, with movement noe pose, cat camel    Time-based treatments/modalities:    Physical Therapy Timed Treatment Charges  Manual therapy minutes (CPT 29069): 20 minutes  Therapeutic exercise minutes (CPT 73843): 25 minutes      ASSESSMENT:   Response to treatment: She has some tightness in the scar and in her R glute soreness from her camera port scar. She is doing quite good with leg movement and progressive strength. We are planning 1 more visit then dc to HEP.      PLAN/RECOMMENDATIONS:   Plan for treatment: therapy treatment to continue next visit.  Planned interventions for next visit: continue with current treatment.

## 2021-12-01 ENCOUNTER — APPOINTMENT (OUTPATIENT)
Dept: PHYSICAL THERAPY | Facility: MEDICAL CENTER | Age: 66
End: 2021-12-01
Payer: MEDICARE

## 2021-12-10 ENCOUNTER — PHYSICAL THERAPY (OUTPATIENT)
Dept: PHYSICAL THERAPY | Facility: MEDICAL CENTER | Age: 66
End: 2021-12-10
Attending: PHYSICIAN ASSISTANT
Payer: MEDICARE

## 2021-12-10 DIAGNOSIS — M48.062 LUMBAR STENOSIS WITH NEUROGENIC CLAUDICATION: ICD-10-CM

## 2021-12-10 PROCEDURE — 97140 MANUAL THERAPY 1/> REGIONS: CPT

## 2021-12-10 PROCEDURE — 97014 ELECTRIC STIMULATION THERAPY: CPT

## 2021-12-10 PROCEDURE — 97110 THERAPEUTIC EXERCISES: CPT

## 2021-12-10 NOTE — OP THERAPY DISCHARGE SUMMARY
Outpatient Physical Therapy  DISCHARGE SUMMARY NOTE      [facility] Outpatient Physical Therapy  [address]  [place] [address]  Phone:  [phone]  Fax:  [phone]    Date of Visit: 12/10/2021    Patient: Concetta Tovar  YOB: 1955  MRN: [id number]     Referring Provider: Patrick Hooker P.A.-C.  [address]  [address]  [place] [address]   Referring Diagnosis Spinal stenosis, lumbar region with neurogenic claudication [M48.062]         Functional Assessment Used        Your patient is being discharged from Physical Therapy with the following comments:   · Goals met  · Progress plateau    Comments:  Concetta was seen in physical therapy for evaluation and treatment of low back pain, weakness in the lower extremities follow surgical fusion 6/21/21. Patient attended a total of 15 treatment sessions over a 2 month time period. Patient initially presented with the following limitations of feeling of leg weakness or some off balance feeling     Treatments to address these limitations included balance exercises, progressive loading and strengthening of the legs, manual work to low back, modalities for pain and nervous system deregulation.       Over the course of therapy patient progressed towards/met the following functional goals    1. Patient to show improved endurance (back) in order to walk 2+ hour in community for shopping/errands GOAL MET  2. Patient to show improved leg strength in order to lift garden supplies/dirt without increased pain GOAL MET  3. Patient able to sit for 30 mins without increase need to move or pain to improve tolerance for driving, sitting work tasks GOAL MET    However, she still does complain of constant ache and some occasional pain flare ups when active. She is good about walking, doing some stretches and working on control and stability with progressive strengthening exercises.     Therapist recommends that  patient continues to stay active and follow care instructions from primary     Thank you for the opportunity to work with Concetta and participate in their care. If I may be of any further assistance please feel free to contact me.     Mc Zarate, PT, DPT    Date: 12/10/2021

## 2021-12-10 NOTE — OP THERAPY DAILY TREATMENT
Outpatient Physical Therapy  DAILY TREATMENT     [facility] Outpatient Physical Therapy  [address]  [place] [address]  Phone:  [phone]  Fax:  [phone]    Date: 12/10/2021    Patient: Concetta Tovar  YOB: 1955  MRN: [id number]     Time Calculation    Start time: 1345  Stop time: 1445 Time Calculation (min): 60 minutes         Chief Complaint: Post-Op Pain and Back Problem    Visit #: 15    SUBJECTIVE:  Concetta is seen for follow up for low back surgery rehab. She is 6 months post op and doing mostly well. She comes in today after a 2 week taper for her last visit and is sore. She did move some furniture and did some cleaning that may have been to much for her.     OBJECTIVE:  Current objective measures:             Therapeutic Exercises (CPT 73416):     1. Upright bike , Seat 1 hole showing , Level 2 x 5 mins for strength     2. Standing stretches , Gastroc and hs , 10 reps each leg     4. Slow tempo squat work , use of cage for assist x 10     5. Kneeling hip flexor stretch with lunge pulse     Therapeutic Treatments and Modalities:     1. Manual Therapy (CPT 56767), Prone, IASTM to low back and paraspinals. Cupping to scar  and clive scar, with movement noe pose, cat camel    2. E Stim Unattended (CPT 37879), IFC and heat to low back     Time-based treatments/modalities:    Physical Therapy Timed Treatment Charges  Manual therapy minutes (CPT 28586): 15 minutes  Therapeutic exercise minutes (CPT 54986): 15 minutes      ASSESSMENT:   Response to treatment: She still has persistent soreness and some occasional flare ups She reports she is better. At this point we have done a lot to progress her with her strength, balance and confidence but she still feels like she has low level constant ache and some occasional flare ups.      PLAN/RECOMMENDATIONS:   Plan for treatment: therapy treatment to continue next visit.  Planned interventions for  next visit: continue with current treatment.

## 2021-12-15 ENCOUNTER — OFFICE VISIT (OUTPATIENT)
Dept: MEDICAL GROUP | Facility: MEDICAL CENTER | Age: 66
End: 2021-12-15
Payer: MEDICARE

## 2021-12-15 VITALS
OXYGEN SATURATION: 96 % | DIASTOLIC BLOOD PRESSURE: 60 MMHG | HEART RATE: 83 BPM | SYSTOLIC BLOOD PRESSURE: 112 MMHG | TEMPERATURE: 97.9 F | WEIGHT: 131.39 LBS | HEIGHT: 64 IN | BODY MASS INDEX: 22.43 KG/M2

## 2021-12-15 DIAGNOSIS — Z13.21 ENCOUNTER FOR VITAMIN DEFICIENCY SCREENING: ICD-10-CM

## 2021-12-15 DIAGNOSIS — Z12.11 COLON CANCER SCREENING: ICD-10-CM

## 2021-12-15 DIAGNOSIS — Z13.6 ENCOUNTER FOR LIPID SCREENING FOR CARDIOVASCULAR DISEASE: ICD-10-CM

## 2021-12-15 DIAGNOSIS — Z13.220 ENCOUNTER FOR LIPID SCREENING FOR CARDIOVASCULAR DISEASE: ICD-10-CM

## 2021-12-15 DIAGNOSIS — E11.9 TYPE 2 DIABETES MELLITUS WITHOUT RETINOPATHY (HCC): ICD-10-CM

## 2021-12-15 DIAGNOSIS — Z98.1 S/P LUMBAR SPINAL FUSION: ICD-10-CM

## 2021-12-15 DIAGNOSIS — E11.69 HYPERLIPIDEMIA DUE TO TYPE 2 DIABETES MELLITUS (HCC): ICD-10-CM

## 2021-12-15 DIAGNOSIS — H61.22 IMPACTED CERUMEN OF LEFT EAR: ICD-10-CM

## 2021-12-15 DIAGNOSIS — F51.01 PRIMARY INSOMNIA: ICD-10-CM

## 2021-12-15 DIAGNOSIS — Z13.0 SCREENING FOR DEFICIENCY ANEMIA: ICD-10-CM

## 2021-12-15 DIAGNOSIS — E78.5 HYPERLIPIDEMIA DUE TO TYPE 2 DIABETES MELLITUS (HCC): ICD-10-CM

## 2021-12-15 PROBLEM — S39.012A BACK STRAIN: Status: RESOLVED | Noted: 2017-08-17 | Resolved: 2021-12-15

## 2021-12-15 PROBLEM — E78.00 PURE HYPERCHOLESTEROLEMIA: Status: RESOLVED | Noted: 2017-08-17 | Resolved: 2021-12-15

## 2021-12-15 PROBLEM — E03.9 ACQUIRED HYPOTHYROIDISM: Status: RESOLVED | Noted: 2017-08-17 | Resolved: 2021-12-15

## 2021-12-15 PROBLEM — Z98.84 S/P GASTRIC BYPASS: Status: RESOLVED | Noted: 2017-08-17 | Resolved: 2021-12-15

## 2021-12-15 PROCEDURE — 99214 OFFICE O/P EST MOD 30 MIN: CPT | Performed by: FAMILY MEDICINE

## 2021-12-15 ASSESSMENT — ENCOUNTER SYMPTOMS
MYALGIAS: 0
FEVER: 0
SORE THROAT: 0
COUGH: 0
NAUSEA: 0
VOMITING: 0
DIZZINESS: 0
NERVOUS/ANXIOUS: 0
BLURRED VISION: 0
WEIGHT LOSS: 0
BACK PAIN: 1
PALPITATIONS: 0
CHILLS: 0
WEAKNESS: 0
DOUBLE VISION: 0
TINGLING: 0
CONSTIPATION: 0
ABDOMINAL PAIN: 0
DEPRESSION: 0
BRUISES/BLEEDS EASILY: 0
SHORTNESS OF BREATH: 0
DIARRHEA: 0

## 2021-12-15 ASSESSMENT — FIBROSIS 4 INDEX: FIB4 SCORE: 1.49

## 2021-12-15 NOTE — ASSESSMENT & PLAN NOTE
Spine surgery on 06/2021 but continues to have lower lumbar pain.  She has an appointment with her spine doctor to discuss the continued pain.  Concerned that they will want to put her back on the gabapentin, states that this is causing significant amount of dizziness and like to avoid this medication.

## 2021-12-15 NOTE — ASSESSMENT & PLAN NOTE
Stopped taking her cholesterol-lowering medication as it was causing significant myalgias.  2 months after stopping her cholesterol-lowering medication she did have her lipids checked which were all within normal limits.  Made significant changes to her diet,, animal proteins and fats.  Primarily a plant-based diet.

## 2021-12-15 NOTE — PROGRESS NOTES
Che Tovar is a pleasant 66 y.o. female here to establish care.    HPI:   S/P lumbar spinal fusion  Spine surgery on 06/2021 but continues to have lower lumbar pain.  She has an appointment with her spine doctor to discuss the continued pain.  Concerned that they will want to put her back on the gabapentin, states that this is causing significant amount of dizziness and like to avoid this medication.    Primary insomnia  Taking trazodone 100 mg every evening to help with her insomnia.    Type 2 diabetes mellitus without retinopathy (HCC)  Taking Trulicity 1.5 mg once a week as well as Metformin 500 mg SR 3 times a day with meals.  Since being placed on the Trulicity has noticed a substantial improvement in her sugar levels.  Will be leaving for [place] in a few weeks and requesting to have a 3-month supply sent to her preferred pharmacy as soon as she is able to see if [facility] carries her medications.    Hyperlipidemia due to type 2 diabetes mellitus (HCC)  Stopped taking her cholesterol-lowering medication as it was causing significant myalgias.  2 months after stopping her cholesterol-lowering medication she did have her lipids checked which were all within normal limits.  Made significant changes to her diet,, animal proteins and fats.  Primarily a plant-based diet.      Health Maintenance  Diabetes topics: Due for her monofilament testing, would like to hold off as she is not dressed appropriately for this exam  Immunization: Patient is due for her third shingles vaccine.    Current medicines (including changes today)  Current Outpatient Medications   Medication Sig Dispense Refill   • Dulaglutide (TRULICITY) 1.5 MG/0.5ML Solution Pen-injector Inject 0.5 mL under the skin every 7 days. 2 mL 5   • levothyroxine (SYNTHROID) 75 MCG Tab Take 1 Tablet by mouth every morning on an empty stomach. 90 Tablet 3   • traZODone (DESYREL) 100 MG Tab Take 1 Tablet by mouth at bedtime as needed for Sleep. 90 Tablet 3    • CALCIUM PO Take 2 Tablets by mouth in the morning, at noon, and at bedtime.     • Prenatal Vit-Fe Fumarate-FA (PRENATAL PO) Take 1 tablet by mouth in the morning, at noon, and at bedtime.     • Cholecalciferol (D3 PO) Take 2 Capsules by mouth every day.     • Thiamine HCl (B-1 PO) Take 1 tablet by mouth every day.     • Ascorbic Acid (VITAMIN C) 1000 MG Tab Take 1,000 mg by mouth every day.     • Cyanocobalamin (VITAMIN B-12 PO) Take 1 tablet by mouth every day.     • ferrous sulfate 325 (65 Fe) MG tablet Take 325 mg by mouth every 7 days. On Friday     • COENZYME Q10 PO Take 1 capsule by mouth every day.     • metFORMIN ER (GLUCOPHAGE XR) 500 MG TABLET SR 24 HR Take 1 Tab by mouth 3 times a day, with meals. 270 Tab 1   • glucose blood (ONE TOUCH ULTRA TEST) strip 1 Strip by Other route 3 times a day as needed. 100 Strip 2   • Blood Glucose Monitoring Suppl Device Meter: Dispense Device of Insurance Preference. Sig. Use as directed for blood sugar monitoring. #1. NR. 1 Device 0   • Lancets Lancets order: Lancets for One Touch Ultra meter. Sig: use once daily 100 Each 3     No current facility-administered medications for this visit.       Past Medical/ Surgical History  She  has a past medical history of Allergy, Anesthesia, Anxiety, Diabetes (HCC), High cholesterol, History of gastric bypass (5/7/2012), total hysterectomy (1/15/2015), Pain, PONV (postoperative nausea and vomiting), Psychiatric problem, and Thyroid disease.  She  has a past surgical history that includes abdominal exploration; eye surgery; hernia repair; gastric bypass laparoscopic; and lumbar fusion o-arm (6/21/2021).    Social History  Social History     Tobacco Use   • Smoking status: Former Smoker     Years: 5.00   • Smokeless tobacco: Never Used   • Tobacco comment: light smoker, quit 40 years ago   Vaping Use   • Vaping Use: Never used   Substance Use Topics   • Alcohol use: Yes     Alcohol/week: 0.0 oz     Comment: 1/DAY   • Drug use: No      Social History     Social History Narrative   • Not on file        Family History  Family History   Problem Relation Age of Onset   • Arthritis Mother    • Diabetes Mother    • Hypertension Mother    • Hyperlipidemia Mother    • Stroke Mother    • Arthritis Father    • Hyperlipidemia Father    • Hypertension Father    • Diabetes Father    • Arthritis Sister    • Cancer Sister    • Hypertension Sister    • Hyperlipidemia Sister    • Alcohol/Drug Sister    • Breast Cancer Sister    • Diabetes Sister    • Hyperlipidemia Brother    • Arthritis Brother    • Hypertension Brother    • Alcohol/Drug Brother    • Diabetes Brother    • Arthritis Sister    • Cancer Sister    • Hypertension Sister    • Hyperlipidemia Sister    • Breast Cancer Sister    • Diabetes Sister    • Arthritis Sister    • Cancer Sister    • Hyperlipidemia Sister    • Breast Cancer Sister    • Diabetes Sister    • Arthritis Sister    • Cancer Sister    • Hyperlipidemia Sister    • Breast Cancer Sister    • Diabetes Sister    • Arthritis Sister    • Hyperlipidemia Sister    • Diabetes Sister    • Hyperlipidemia Sister    • Diabetes Sister    • Hyperlipidemia Sister    • Diabetes Sister    • Hyperlipidemia Brother    • Arthritis Brother    • Diabetes Brother    • Hyperlipidemia Brother    • Diabetes Brother    • Hyperlipidemia Sister    • Diabetes Sister      Family Status   Relation Name Status   • Mo     • Fa     • Sis 1 Alive   • Bro 1 Alive   • Sis 2 Alive   • Sis 3 Alive   • Sis 4 Alive   • Sis 5 Alive   • Sis 6 Alive   • Sis 7 Alive   • Bro 2 Alive   • Bro 3    • Son  Alive   • Son  Alive   • Darío  Alive   • Sis 8 (Not Specified)         Review of Systems   Constitutional: Negative for chills, fever, malaise/fatigue and weight loss.   HENT: Negative for hearing loss and sore throat.    Eyes: Negative for blurred vision and double vision.   Respiratory: Negative for cough and shortness of breath.    Cardiovascular: Negative  "for chest pain, palpitations and leg swelling.   Gastrointestinal: Negative for abdominal pain, constipation, diarrhea, nausea and vomiting.   Musculoskeletal: Positive for back pain (Chronic). Negative for myalgias.   Skin: Negative for rash.   Neurological: Negative for dizziness, tingling and weakness.   Endo/Heme/Allergies: Does not bruise/bleed easily.   Psychiatric/Behavioral: Negative for depression. The patient is not nervous/anxious.          Objective:     /60 (BP Location: Right arm, Patient Position: Sitting, BP Cuff Size: Adult)   Pulse 83   Temp 36.6 °C (97.9 °F) (Temporal)   Ht 1.626 m (5' 4\")   Wt 59.6 kg (131 lb 6.3 oz)   SpO2 96%  Body mass index is 22.55 kg/m².    Physical Exam  Constitutional:       General: She is not in acute distress.  HENT:      Head: Normocephalic and atraumatic.      Right Ear: External ear normal.      Left Ear: There is impacted cerumen.   Eyes:      General: Lids are normal.      Extraocular Movements: Extraocular movements intact.      Conjunctiva/sclera: Conjunctivae normal.      Pupils: Pupils are equal, round, and reactive to light.   Neck:      Trachea: Trachea normal.   Cardiovascular:      Rate and Rhythm: Normal rate and regular rhythm.      Heart sounds: Normal heart sounds. No murmur heard.  No friction rub. No gallop.    Pulmonary:      Effort: Pulmonary effort is normal. No accessory muscle usage.      Breath sounds: Normal breath sounds. No wheezing or rales.   Abdominal:      General: Bowel sounds are normal.      Palpations: Abdomen is soft.      Tenderness: There is no abdominal tenderness.   Musculoskeletal:      Cervical back: Normal range of motion and neck supple.      Right lower leg: No edema.      Left lower leg: No edema.   Lymphadenopathy:      Cervical: No cervical adenopathy.   Skin:     General: Skin is warm and dry.      Findings: No rash.   Neurological:      General: No focal deficit present.      Mental Status: She is alert and " oriented to person, place, and time.      GCS: GCS eye subscore is 4. GCS verbal subscore is 5. GCS motor subscore is 6.      Gait: Gait is intact.      Deep Tendon Reflexes:      Reflex Scores:       Brachioradialis reflexes are 2+ on the right side and 2+ on the left side.       Patellar reflexes are 2+ on the right side and 2+ on the left side.  Psychiatric:         Attention and Perception: Attention normal.         Mood and Affect: Affect normal.         Speech: Speech normal.        Imaging:  10/12/2021 mammogram: Negative    Labs:  Most recent labs from 08/13/2021 reviewed with patient.  Assessment and Plan:   The following treatment plan was discussed    1. S/P lumbar spinal fusion  Chronic, unstable.  I recommend the patient continue following up with her spine specialist.  I did recommend that if she does not like the side effect of the gabapentin she should mention this to the provider to see if there are other options for her.    2. Hyperlipidemia due to type 2 diabetes mellitus (HCC)  Chronic, stable.  I recommended that the patient continue to watch her diet as well as increasing physical activity to help keep her cholesterol level worse under control.  Discussed with the patient that we may try additional medications then from the medication that caused her to have myalgias.  I recommended that we hold off for now to see what her cholesterol levels are doing.    3. Type 2 diabetes mellitus without retinopathy (HCC)  Chronic, stable.  Recommend that the patient continue taking her Metformin and Trulicity as its helping to get better control of her diabetes.  We will going recheck a A1c and obtain microalbumin creatinine ratio.  - Microalbumin Creat Ratio Urine (Lab Collect); Future  - Comp Metabolic Panel; Future  - ESTIMATED GFR; Future  - HEMOGLOBIN A1C; Future    4. Primary insomnia  Chronic, stable.  Recommend that the patient continue taking the trazodone to help with her insomnia type  symptoms.    5. Colon cancer screening  Cologuard ordered for the patient.  - COLOGUARD COLON CANCER SCREENING    6. Encounter for vitamin deficiency screening  - VITAMIN D,25 HYDROXY; Future    7. Encounter for lipid screening for cardiovascular disease  - Lipid Profile; Future    8. Screening for deficiency anemia  - CBC WITH DIFFERENTIAL; Future    9. Impacted cerumen of left ear  - Ear Irrigation (MA Only); Future      Records requested.  Followup: Return in about 3 months (around 3/15/2022), or if symptoms worsen or fail to improve.    I have placed ear irrigation orders.  The MA is preforming ear irrigation orders under the direction of Dr. Plunkett    Please note that this dictation was created using voice recognition software. I have made every reasonable attempt to correct obvious errors, but I expect that there are errors of grammar and possibly content that I did not discover before finalizing the note.

## 2021-12-15 NOTE — ASSESSMENT & PLAN NOTE
Taking Trulicity 1.5 mg once a week as well as Metformin 500 mg SR 3 times a day with meals.  Since being placed on the Trulicity has noticed a substantial improvement in her sugar levels.  Will be leaving for [place] in a few weeks and requesting to have a 3-month supply sent to her preferred pharmacy as soon as she is able to see if [facility] carries her medications.

## 2022-02-23 ENCOUNTER — PATIENT MESSAGE (OUTPATIENT)
Dept: MEDICAL GROUP | Facility: MEDICAL CENTER | Age: 67
End: 2022-02-23
Payer: MEDICARE

## 2022-02-23 DIAGNOSIS — E11.9 TYPE 2 DIABETES MELLITUS WITHOUT COMPLICATION, WITHOUT LONG-TERM CURRENT USE OF INSULIN (HCC): ICD-10-CM

## 2022-02-23 RX ORDER — DULAGLUTIDE 1.5 MG/.5ML
0.5 INJECTION, SOLUTION SUBCUTANEOUS
Qty: 2 ML | Refills: 5 | Status: SHIPPED | OUTPATIENT
Start: 2022-02-23 | End: 2022-02-24 | Stop reason: SDUPTHER

## 2022-02-23 NOTE — TELEPHONE ENCOUNTER
From: Che Tovar  To: Nurse Practitioner Mahnaz Geller  Sent: 2/23/2022 10:31 AM PST  [facility] I'm completely out.

## 2022-02-24 RX ORDER — DULAGLUTIDE 1.5 MG/.5ML
0.5 INJECTION, SOLUTION SUBCUTANEOUS
Qty: 2 ML | Refills: 5 | Status: SHIPPED | OUTPATIENT
Start: 2022-02-24 | End: 2022-07-26 | Stop reason: SDUPTHER

## 2022-02-24 NOTE — PATIENT COMMUNICATION
Received request via: Patient    Was the patient seen in the last year in this department? Yes    Does the patient have an active prescription (recently filled or refills available) for medication(s) requested? No     Patient states she needs this medication by tomorrow. Please advise if you are able to fill since Mahnaz is out today.

## 2022-02-24 NOTE — TELEPHONE ENCOUNTER
From: Che Tovar  To: Nurse Practitioner Mahnaz Geller  Sent: 2022 1:50 PM PST  Subject: Trulicity    Please let me know when this prescription has been sent to [facility]. My sister passed away and I'm leaving tomorrow for the  and NEED this medication before I leave.

## 2022-03-22 ENCOUNTER — OFFICE VISIT (OUTPATIENT)
Dept: MEDICAL GROUP | Facility: MEDICAL CENTER | Age: 67
End: 2022-03-22

## 2022-03-22 ENCOUNTER — HOSPITAL ENCOUNTER (OUTPATIENT)
Dept: LAB | Facility: MEDICAL CENTER | Age: 67
End: 2022-03-22
Attending: FAMILY MEDICINE
Payer: MEDICARE

## 2022-03-22 VITALS
TEMPERATURE: 97.6 F | OXYGEN SATURATION: 98 % | WEIGHT: 130.95 LBS | BODY MASS INDEX: 25.71 KG/M2 | SYSTOLIC BLOOD PRESSURE: 114 MMHG | HEIGHT: 60 IN | HEART RATE: 68 BPM | DIASTOLIC BLOOD PRESSURE: 62 MMHG

## 2022-03-22 DIAGNOSIS — K92.1 BLOODY STOOL: ICD-10-CM

## 2022-03-22 LAB
BASOPHILS # BLD AUTO: 0.8 % (ref 0–1.8)
BASOPHILS # BLD: 0.05 K/UL (ref 0–0.12)
EOSINOPHIL # BLD AUTO: 0.04 K/UL (ref 0–0.51)
EOSINOPHIL NFR BLD: 0.6 % (ref 0–6.9)
ERYTHROCYTE [DISTWIDTH] IN BLOOD BY AUTOMATED COUNT: 45.8 FL (ref 35.9–50)
FERRITIN SERPL-MCNC: 57.6 NG/ML (ref 10–291)
HCT VFR BLD AUTO: 40.7 % (ref 37–47)
HGB BLD-MCNC: 13.5 G/DL (ref 12–16)
IMM GRANULOCYTES # BLD AUTO: 0.01 K/UL (ref 0–0.11)
IMM GRANULOCYTES NFR BLD AUTO: 0.2 % (ref 0–0.9)
LYMPHOCYTES # BLD AUTO: 2.39 K/UL (ref 1–4.8)
LYMPHOCYTES NFR BLD: 37.4 % (ref 22–41)
MCH RBC QN AUTO: 31.3 PG (ref 27–33)
MCHC RBC AUTO-ENTMCNC: 33.2 G/DL (ref 33.6–35)
MCV RBC AUTO: 94.2 FL (ref 81.4–97.8)
MONOCYTES # BLD AUTO: 0.57 K/UL (ref 0–0.85)
MONOCYTES NFR BLD AUTO: 8.9 % (ref 0–13.4)
NEUTROPHILS # BLD AUTO: 3.33 K/UL (ref 2–7.15)
NEUTROPHILS NFR BLD: 52.1 % (ref 44–72)
NRBC # BLD AUTO: 0 K/UL
NRBC BLD-RTO: 0 /100 WBC
PLATELET # BLD AUTO: 272 K/UL (ref 164–446)
PMV BLD AUTO: 11 FL (ref 9–12.9)
RBC # BLD AUTO: 4.32 M/UL (ref 4.2–5.4)
WBC # BLD AUTO: 6.4 K/UL (ref 4.8–10.8)

## 2022-03-22 PROCEDURE — 99213 OFFICE O/P EST LOW 20 MIN: CPT | Performed by: FAMILY MEDICINE

## 2022-03-22 PROCEDURE — 36415 COLL VENOUS BLD VENIPUNCTURE: CPT

## 2022-03-22 PROCEDURE — 85025 COMPLETE CBC W/AUTO DIFF WBC: CPT

## 2022-03-22 PROCEDURE — 82728 ASSAY OF FERRITIN: CPT

## 2022-03-22 ASSESSMENT — ENCOUNTER SYMPTOMS
WEIGHT LOSS: 0
COUGH: 0
PALPITATIONS: 0
CHILLS: 0
DIZZINESS: 0
SHORTNESS OF BREATH: 0
WEAKNESS: 0
ABDOMINAL PAIN: 1
MYALGIAS: 0
BLOOD IN STOOL: 1
DEPRESSION: 0
FEVER: 0
DIARRHEA: 1

## 2022-03-22 ASSESSMENT — PATIENT HEALTH QUESTIONNAIRE - PHQ9: CLINICAL INTERPRETATION OF PHQ2 SCORE: 0

## 2022-03-22 ASSESSMENT — FIBROSIS 4 INDEX: FIB4 SCORE: 1.49

## 2022-03-22 NOTE — PROGRESS NOTES
Che Tovar is a pleasant 66 y.o. female here for   Chief Complaint   Patient presents with   • Stool Color Change     Blood in stool , x 2 wks       HPI:   Problem   Bloody Stool    Started 2 weeks ago and only with diarrhea.  Not noticed any blood in her stool and she has formed stools.  Hx of gastric bypass and advised that she will have diarrhea a few times a week.  Increase in low abdominal cramping, but not always associated with diarrhea.  Yesterday the pain was better, but today the pain as increased.  Taking only tylenol for the pain, avoiding motrin or asprin.  Reports that the toilet bowel will turn red, but denies blood in solid stool.  Does have some mucus in her diarrhea.    Completed a Cologuard test 2 months ago which came back negative.          Current Medicines (including changes today)  Current Outpatient Medications   Medication Sig Dispense Refill   • Dulaglutide (TRULICITY) 1.5 MG/0.5ML Solution Pen-injector Inject 0.5 mL under the skin every 7 days. 2 mL 5   • tizanidine (ZANAFLEX) 2 MG tablet Take 1 Tablet by mouth 3 times a day as needed. 60 Tablet 1   • levothyroxine (SYNTHROID) 75 MCG Tab Take 1 Tablet by mouth every morning on an empty stomach. 90 Tablet 3   • traZODone (DESYREL) 100 MG Tab Take 1 Tablet by mouth at bedtime as needed for Sleep. 90 Tablet 3   • CALCIUM PO Take 2 Tablets by mouth in the morning, at noon, and at bedtime.     • Prenatal Vit-Fe Fumarate-FA (PRENATAL PO) Take 1 tablet by mouth in the morning, at noon, and at bedtime.     • Cholecalciferol (D3 PO) Take 2 Capsules by mouth every day.     • Thiamine HCl (B-1 PO) Take 1 tablet by mouth every day.     • Ascorbic Acid (VITAMIN C) 1000 MG Tab Take 1,000 mg by mouth every day.     • Cyanocobalamin (VITAMIN B-12 PO) Take 1 tablet by mouth every day.     • ferrous sulfate 325 (65 Fe) MG tablet Take 325 mg by mouth every 7 days. On Friday     • COENZYME Q10 PO Take 1 capsule by mouth every day.     • metFORMIN  ER (GLUCOPHAGE XR) 500 MG TABLET SR 24 HR Take 1 Tab by mouth 3 times a day, with meals. 270 Tab 1   • glucose blood (ONE TOUCH ULTRA TEST) strip 1 Strip by Other route 3 times a day as needed. 100 Strip 2   • Blood Glucose Monitoring Suppl Device Meter: Dispense Device of Insurance Preference. Sig. Use as directed for blood sugar monitoring. #1. NR. 1 Device 0   • Lancets Lancets order: Lancets for One Touch Ultra meter. Sig: use once daily 100 Each 3     No current facility-administered medications for this visit.     Past Medical/ Surgical History  She  has a past medical history of Allergy, Anesthesia, Anxiety, Diabetes (HCC), High cholesterol, History of gastric bypass (5/7/2012), total hysterectomy (1/15/2015), Pain, PONV (postoperative nausea and vomiting), Psychiatric problem, and Thyroid disease.  She  has a past surgical history that includes abdominal exploration; eye surgery; hernia repair; gastric bypass laparoscopic; and lumbar fusion o-arm (6/21/2021).    Review of Systems   Constitutional: Negative for chills, fever, malaise/fatigue and weight loss.   Respiratory: Negative for cough and shortness of breath.    Cardiovascular: Negative for chest pain and palpitations.   Gastrointestinal: Positive for abdominal pain, blood in stool and diarrhea.   Genitourinary: Negative.    Musculoskeletal: Negative for myalgias.   Skin: Negative for rash.   Neurological: Negative for dizziness and weakness.   Psychiatric/Behavioral: Negative for depression.         Objective:     /62 (BP Location: Right arm, Patient Position: Sitting, BP Cuff Size: Adult)   Pulse 68   Temp 36.4 °C (97.6 °F) (Temporal)   Ht 1.524 m (5')   Wt 59.4 kg (130 lb 15.3 oz)   SpO2 98%  Body mass index is 25.58 kg/m².    Physical Exam  Constitutional:       General: She is not in acute distress.  HENT:      Head: Normocephalic and atraumatic.   Eyes:      Conjunctiva/sclera: Conjunctivae normal.      Pupils: Pupils are equal, round,  and reactive to light.   Pulmonary:      Effort: Pulmonary effort is normal. No respiratory distress.   Abdominal:      General: There is no distension.      Tenderness: There is no abdominal tenderness.   Musculoskeletal:      Cervical back: Normal range of motion and neck supple.   Skin:     General: Skin is warm and dry.      Findings: No rash.   Neurological:      Mental Status: She is alert and oriented to person, place, and time.      Gait: Gait is intact.   Psychiatric:         Mood and Affect: Affect normal.        Imaging:  Imaging to review    Labs  No recent labs to review  Assessment and Plan:   The following treatment plan was discussed    Problem List Items Addressed This Visit     Bloody stool     Acute.  Differentials include colitis, Crohn's, irritable bowel, ulceration.  I did recommend that we complete a CBC and a ferritin to ensure that she has not had a significant amount of blood loss.  I also placed a referral to endocrinology and to occult stool test.  I did recommend to the patient that if she is starting to get more persistent bloody diarrhea that she will need to seek treatment in the emergency room.         Relevant Orders    Referral to Gastroenterology    CBC WITH DIFFERENTIAL (Completed)    FERRITIN    OCCULT BLOOD STOOL           Followup: Return if symptoms worsen or fail to improve.      Please note that this dictation was created using voice recognition software. I have made every reasonable attempt to correct obvious errors, but I expect that there are errors of grammar and possibly content that I did not discover before finalizing the note.

## 2022-03-24 ENCOUNTER — HOSPITAL ENCOUNTER (OUTPATIENT)
Facility: MEDICAL CENTER | Age: 67
End: 2022-03-24
Attending: FAMILY MEDICINE
Payer: MEDICARE

## 2022-03-24 LAB — HEMOCCULT STL QL: NEGATIVE

## 2022-03-24 PROCEDURE — 82272 OCCULT BLD FECES 1-3 TESTS: CPT

## 2022-05-18 ENCOUNTER — HOSPITAL ENCOUNTER (OUTPATIENT)
Dept: RADIOLOGY | Facility: MEDICAL CENTER | Age: 67
End: 2022-05-18
Payer: MEDICARE

## 2022-05-18 DIAGNOSIS — R19.7 DIARRHEA OF PRESUMED INFECTIOUS ORIGIN: ICD-10-CM

## 2022-05-18 DIAGNOSIS — R10.30 INGUINAL PAIN, UNSPECIFIED LATERALITY: ICD-10-CM

## 2022-05-18 DIAGNOSIS — K92.1 BLOOD IN STOOL: ICD-10-CM

## 2022-05-18 PROCEDURE — 76700 US EXAM ABDOM COMPLETE: CPT

## 2022-06-14 NOTE — TELEPHONE ENCOUNTER
From: Che Tovar  To: ONELIA Johnson  Sent: 4/25/2019 9:22 AM PDT  Subject: Prescription Question    my friend is taking benzonatate (100mg) for a cough and swears by it. can I get a perscription to see if it will help my constant cough.?   121

## 2022-07-21 ENCOUNTER — HOSPITAL ENCOUNTER (OUTPATIENT)
Dept: LAB | Facility: MEDICAL CENTER | Age: 67
End: 2022-07-21
Attending: FAMILY MEDICINE
Payer: MEDICARE

## 2022-07-21 DIAGNOSIS — Z13.0 SCREENING FOR DEFICIENCY ANEMIA: ICD-10-CM

## 2022-07-21 DIAGNOSIS — Z13.21 ENCOUNTER FOR VITAMIN DEFICIENCY SCREENING: ICD-10-CM

## 2022-07-21 DIAGNOSIS — Z13.220 ENCOUNTER FOR LIPID SCREENING FOR CARDIOVASCULAR DISEASE: ICD-10-CM

## 2022-07-21 DIAGNOSIS — E11.9 TYPE 2 DIABETES MELLITUS WITHOUT RETINOPATHY (HCC): ICD-10-CM

## 2022-07-21 DIAGNOSIS — Z13.6 ENCOUNTER FOR LIPID SCREENING FOR CARDIOVASCULAR DISEASE: ICD-10-CM

## 2022-07-21 LAB
25(OH)D3 SERPL-MCNC: 74 NG/ML (ref 30–100)
ALBUMIN SERPL BCP-MCNC: 4.3 G/DL (ref 3.2–4.9)
ALBUMIN/GLOB SERPL: 1.2 G/DL
ALP SERPL-CCNC: 75 U/L (ref 30–99)
ALT SERPL-CCNC: 17 U/L (ref 2–50)
ANION GAP SERPL CALC-SCNC: 12 MMOL/L (ref 7–16)
AST SERPL-CCNC: 18 U/L (ref 12–45)
BASOPHILS # BLD AUTO: 1 % (ref 0–1.8)
BASOPHILS # BLD: 0.04 K/UL (ref 0–0.12)
BILIRUB SERPL-MCNC: 0.8 MG/DL (ref 0.1–1.5)
BUN SERPL-MCNC: 11 MG/DL (ref 8–22)
CALCIUM SERPL-MCNC: 9.7 MG/DL (ref 8.4–10.2)
CHLORIDE SERPL-SCNC: 101 MMOL/L (ref 96–112)
CHOLEST SERPL-MCNC: 216 MG/DL (ref 100–199)
CO2 SERPL-SCNC: 24 MMOL/L (ref 20–33)
CREAT SERPL-MCNC: 0.7 MG/DL (ref 0.5–1.4)
CREAT UR-MCNC: 104.4 MG/DL
EOSINOPHIL # BLD AUTO: 0.07 K/UL (ref 0–0.51)
EOSINOPHIL NFR BLD: 1.8 % (ref 0–6.9)
ERYTHROCYTE [DISTWIDTH] IN BLOOD BY AUTOMATED COUNT: 45.8 FL (ref 35.9–50)
EST. AVERAGE GLUCOSE BLD GHB EST-MCNC: 126 MG/DL
GFR SERPLBLD CREATININE-BSD FMLA CKD-EPI: 94 ML/MIN/1.73 M 2
GLOBULIN SER CALC-MCNC: 3.6 G/DL (ref 1.9–3.5)
GLUCOSE SERPL-MCNC: 134 MG/DL (ref 65–99)
HBA1C MFR BLD: 6 % (ref 4–5.6)
HCT VFR BLD AUTO: 42 % (ref 37–47)
HDLC SERPL-MCNC: 67 MG/DL
HGB BLD-MCNC: 13.9 G/DL (ref 12–16)
IMM GRANULOCYTES # BLD AUTO: 0.01 K/UL (ref 0–0.11)
IMM GRANULOCYTES NFR BLD AUTO: 0.3 % (ref 0–0.9)
LDLC SERPL CALC-MCNC: 126 MG/DL
LYMPHOCYTES # BLD AUTO: 1.39 K/UL (ref 1–4.8)
LYMPHOCYTES NFR BLD: 35.7 % (ref 22–41)
MCH RBC QN AUTO: 31.2 PG (ref 27–33)
MCHC RBC AUTO-ENTMCNC: 33.1 G/DL (ref 33.6–35)
MCV RBC AUTO: 94.4 FL (ref 81.4–97.8)
MICROALBUMIN UR-MCNC: <1.2 MG/DL
MICROALBUMIN/CREAT UR: NORMAL MG/G (ref 0–30)
MONOCYTES # BLD AUTO: 0.41 K/UL (ref 0–0.85)
MONOCYTES NFR BLD AUTO: 10.5 % (ref 0–13.4)
NEUTROPHILS # BLD AUTO: 1.97 K/UL (ref 2–7.15)
NEUTROPHILS NFR BLD: 50.7 % (ref 44–72)
NRBC # BLD AUTO: 0 K/UL
NRBC BLD-RTO: 0 /100 WBC
PLATELET # BLD AUTO: 245 K/UL (ref 164–446)
PMV BLD AUTO: 11.3 FL (ref 9–12.9)
POTASSIUM SERPL-SCNC: 4.3 MMOL/L (ref 3.6–5.5)
PROT SERPL-MCNC: 7.9 G/DL (ref 6–8.2)
RBC # BLD AUTO: 4.45 M/UL (ref 4.2–5.4)
SODIUM SERPL-SCNC: 137 MMOL/L (ref 135–145)
TRIGL SERPL-MCNC: 113 MG/DL (ref 0–149)
WBC # BLD AUTO: 3.9 K/UL (ref 4.8–10.8)

## 2022-07-21 PROCEDURE — 85025 COMPLETE CBC W/AUTO DIFF WBC: CPT

## 2022-07-21 PROCEDURE — 36415 COLL VENOUS BLD VENIPUNCTURE: CPT | Mod: GA

## 2022-07-21 PROCEDURE — 82306 VITAMIN D 25 HYDROXY: CPT

## 2022-07-21 PROCEDURE — 80061 LIPID PANEL: CPT

## 2022-07-21 PROCEDURE — 82043 UR ALBUMIN QUANTITATIVE: CPT

## 2022-07-21 PROCEDURE — 83036 HEMOGLOBIN GLYCOSYLATED A1C: CPT | Mod: GA

## 2022-07-21 PROCEDURE — 82570 ASSAY OF URINE CREATININE: CPT

## 2022-07-21 PROCEDURE — 80053 COMPREHEN METABOLIC PANEL: CPT

## 2022-07-26 ENCOUNTER — HOSPITAL ENCOUNTER (OUTPATIENT)
Dept: RADIOLOGY | Facility: MEDICAL CENTER | Age: 67
End: 2022-07-26
Attending: FAMILY MEDICINE
Payer: MEDICARE

## 2022-07-26 ENCOUNTER — OFFICE VISIT (OUTPATIENT)
Dept: MEDICAL GROUP | Facility: MEDICAL CENTER | Age: 67
End: 2022-07-26
Payer: MEDICARE

## 2022-07-26 VITALS
OXYGEN SATURATION: 97 % | SYSTOLIC BLOOD PRESSURE: 120 MMHG | WEIGHT: 133.4 LBS | DIASTOLIC BLOOD PRESSURE: 70 MMHG | TEMPERATURE: 98 F | HEART RATE: 84 BPM | BODY MASS INDEX: 26.19 KG/M2 | HEIGHT: 60 IN

## 2022-07-26 DIAGNOSIS — L98.9 SKIN LESION: ICD-10-CM

## 2022-07-26 DIAGNOSIS — E78.5 HYPERLIPIDEMIA DUE TO TYPE 2 DIABETES MELLITUS (HCC): ICD-10-CM

## 2022-07-26 DIAGNOSIS — M25.522 LEFT ELBOW PAIN: ICD-10-CM

## 2022-07-26 DIAGNOSIS — D72.819 LEUKOPENIA, UNSPECIFIED TYPE: ICD-10-CM

## 2022-07-26 DIAGNOSIS — E11.69 HYPERLIPIDEMIA DUE TO TYPE 2 DIABETES MELLITUS (HCC): ICD-10-CM

## 2022-07-26 DIAGNOSIS — E11.9 TYPE 2 DIABETES MELLITUS WITHOUT RETINOPATHY (HCC): ICD-10-CM

## 2022-07-26 PROCEDURE — 99214 OFFICE O/P EST MOD 30 MIN: CPT | Performed by: FAMILY MEDICINE

## 2022-07-26 PROCEDURE — 73080 X-RAY EXAM OF ELBOW: CPT | Mod: LT

## 2022-07-26 RX ORDER — ROSUVASTATIN CALCIUM 5 MG/1
2.5 TABLET, COATED ORAL
Qty: 8 TABLET | Refills: 1 | Status: SHIPPED | OUTPATIENT
Start: 2022-07-26 | End: 2022-08-15 | Stop reason: SDUPTHER

## 2022-07-26 RX ORDER — DULAGLUTIDE 1.5 MG/.5ML
0.5 INJECTION, SOLUTION SUBCUTANEOUS
Qty: 2 ML | Refills: 5 | Status: SHIPPED | OUTPATIENT
Start: 2022-07-26 | End: 2022-10-16 | Stop reason: SDUPTHER

## 2022-07-26 ASSESSMENT — ENCOUNTER SYMPTOMS
WEIGHT LOSS: 0
DEPRESSION: 0
DIZZINESS: 0
PALPITATIONS: 0
SHORTNESS OF BREATH: 0
CHILLS: 0
FEVER: 0
MYALGIAS: 0
ABDOMINAL PAIN: 0
WEAKNESS: 0
COUGH: 0

## 2022-07-26 ASSESSMENT — FIBROSIS 4 INDEX: FIB4 SCORE: 1.21

## 2022-07-26 NOTE — ASSESSMENT & PLAN NOTE
Chronic, unstable.  Advised to initiate rosuvastatin 2.5 mg every other day initially.  Educated on potential side effects.  Recommended to reduce the amount of animal fats and proteins while increasing compliant cholesterols.

## 2022-07-26 NOTE — ASSESSMENT & PLAN NOTE
Chronic, unstable.  Okay to use gabapentin from home 300 mg to help with the discomfort.  X-ray ordered.  May need possible referral to neurology due to description of nerve pain.

## 2022-07-26 NOTE — PROGRESS NOTES
Che Tovar is a pleasant 67 y.o. female here for   Chief Complaint   Patient presents with   • Elbow Pain     Left elbow, swollen, x 1 year    • Lab Results      HPI:   Problem   Skin Lesion    Location:  Right foot plantar aspect of fourth toe    Asymmetry: Symmetric    Border: Slight irregular border    Color: Consistent, dark brown    Diameter: 4 mm    Evolution/Enlargement: Enlarging    Time present:  Noticed at 3 months ago  Painful lesion:  Denies  Itching lesion:  Denies  Anything make it better or worse: Denies     Left Elbow Pain    Developed left elbow pain and swelling x1 year ago.  Worsening over the last 2 months, but pain has been pretty consistent.  Did mention this to previous provider and it was thought to be increase in fatty deposits.  Describes the pain as burning sensation or as if she struck her elbow on something.  Denies any injuries or repetition.       Leukopenia    Labs on 07/21 showed WBC 3.9 with absolute neutrophil at 1.97.  Denies any recent illnesses.     Oz (Generalized Anxiety Disorder)    IMO load March 2020    Formatting of this note might be different from the original.  Formatting of this note might be different from the original.  IMO load March 2020    Last Assessment & Plan:   Formatting of this note might be different from the original.  Doing well on Lexapro     Type 2 Diabetes Mellitus Without Retinopathy (Hcc)    Currently taking Trulicity 1.5 mg every 7 days, metformin 500 mg 3 times daily.  Labs on 07/21 show A1c at 6.0, fasting blood sugar 134.  Reports an improvement in her sugars since starting the Trulicity.     Hx of Total Hysterectomy   Hyperlipidemia Due to Type 2 Diabetes Mellitus (Hcc)     Latest Reference Range & Units 07/21/22 08:20   Cholesterol,Tot 100 - 199 mg/dL 216 (H)   Triglycerides 0 - 149 mg/dL 113   HDL >=40 mg/dL 67   LDL <100 mg/dL 126 (H)   (H): Data is abnormally high    Did try lovastatin in the past but did not tolerate this  medication due to muscle cramping.  Positive history for diabetes, okay to try an alternative statin therapy.            Current Medicines (including changes today)  Current Outpatient Medications   Medication Sig Dispense Refill   • rosuvastatin (CRESTOR) 5 MG Tab Take 0.5 Tablets by mouth every 48 hours for 30 days. Take in the evening time 8 Tablet 1   • Dulaglutide (TRULICITY) 1.5 MG/0.5ML Solution Pen-injector Inject 0.5 mL under the skin every 7 days. 2 mL 5   • diclofenac sodium (VOLTAREN) 1 % Gel Apply 2 g topically 4 times a day as needed (apply to affected area as needed). 150 g 4   • tizanidine (ZANAFLEX) 2 MG tablet Take 1 Tablet by mouth 3 times a day as needed. 60 Tablet 1   • levothyroxine (SYNTHROID) 75 MCG Tab Take 1 Tablet by mouth every morning on an empty stomach. 90 Tablet 3   • traZODone (DESYREL) 100 MG Tab Take 1 Tablet by mouth at bedtime as needed for Sleep. 90 Tablet 3   • CALCIUM PO Take 2 Tablets by mouth in the morning, at noon, and at bedtime.     • Prenatal Vit-Fe Fumarate-FA (PRENATAL PO) Take 1 tablet by mouth in the morning, at noon, and at bedtime.     • Cholecalciferol (D3 PO) Take 2 Capsules by mouth every day.     • Thiamine HCl (B-1 PO) Take 1 tablet by mouth every day.     • Ascorbic Acid (VITAMIN C) 1000 MG Tab Take 1,000 mg by mouth every day.     • Cyanocobalamin (VITAMIN B-12 PO) Take 1 tablet by mouth every day.     • ferrous sulfate 325 (65 Fe) MG tablet Take 325 mg by mouth every 7 days. On Friday     • COENZYME Q10 PO Take 1 capsule by mouth every day.     • metFORMIN ER (GLUCOPHAGE XR) 500 MG TABLET SR 24 HR Take 1 Tab by mouth 3 times a day, with meals. 270 Tab 1   • glucose blood (ONE TOUCH ULTRA TEST) strip 1 Strip by Other route 3 times a day as needed. 100 Strip 2   • Blood Glucose Monitoring Suppl Device Meter: Dispense Device of Insurance Preference. Sig. Use as directed for blood sugar monitoring. #1. NR. 1 Device 0   • Lancets Lancets order: Lancets for One  Touch Ultra meter. Sig: use once daily 100 Each 3     No current facility-administered medications for this visit.     Past Medical/ Surgical History  She  has a past medical history of Allergy, Anesthesia, Anxiety, Diabetes (HCC), High cholesterol, History of gastric bypass (5/7/2012), total hysterectomy (1/15/2015), Pain, PONV (postoperative nausea and vomiting), Psychiatric problem, and Thyroid disease.  She  has a past surgical history that includes abdominal exploration; eye surgery; hernia repair; gastric bypass laparoscopic; and lumbar fusion o-arm (6/21/2021).    Review of Systems   Constitutional: Negative for chills, fever, malaise/fatigue and weight loss.   Respiratory: Negative for cough and shortness of breath.    Cardiovascular: Negative for chest pain and palpitations.   Gastrointestinal: Negative for abdominal pain.   Genitourinary: Negative.    Musculoskeletal: Positive for joint pain (Left elbow pain and swelling). Negative for myalgias.   Skin: Negative for rash.        New skin lesion to the bottom of right foot and right forearm   Neurological: Negative for dizziness and weakness.   Psychiatric/Behavioral: Negative for depression.         Objective:     /70 (BP Location: Left arm, Patient Position: Sitting, BP Cuff Size: Adult)   Pulse 84   Temp 36.7 °C (98 °F) (Temporal)   Ht 1.524 m (5')   Wt 60.5 kg (133 lb 6.4 oz)   SpO2 97%  Body mass index is 26.05 kg/m².    Physical Exam  Constitutional:       General: She is not in acute distress.  HENT:      Head: Normocephalic and atraumatic.   Eyes:      Conjunctiva/sclera: Conjunctivae normal.      Pupils: Pupils are equal, round, and reactive to light.   Pulmonary:      Effort: Pulmonary effort is normal. No respiratory distress.   Abdominal:      General: There is no distension.   Musculoskeletal:      Cervical back: Normal range of motion and neck supple.        Feet:    Skin:     General: Skin is warm and dry.      Findings: No rash.       Comments: 5 mm macular skin lesion oval-shaped, brown to bright forearm.   Neurological:      Mental Status: She is alert and oriented to person, place, and time.      Gait: Gait is intact.   Psychiatric:         Mood and Affect: Affect normal.        Monofilament testing with a 10 gram force: sensation intact: intact bilaterally  Visual Inspection: Feet without maceration, ulcers, fissures.  Pedal pulses: intact bilaterally      Imaging:  No recent imaging to review    Labs  Recent labs from 07/21/2022 reviewed with the patient.    Assessment and Plan:   The following treatment plan was discussed    Problem List Items Addressed This Visit     Type 2 diabetes mellitus without retinopathy (HCC)     Chronic, stable.  Continue Trulicity and metformin.  Recheck A1c in 3 months.           Relevant Medications    Dulaglutide (TRULICITY) 1.5 MG/0.5ML Solution Pen-injector    Other Relevant Orders    Diabetic Monofilament LE Exam (Completed)    HEMOGLOBIN A1C    Hyperlipidemia due to type 2 diabetes mellitus (HCC)     Chronic, unstable.  Advised to initiate rosuvastatin 2.5 mg every other day initially.  Educated on potential side effects.  Recommended to reduce the amount of animal fats and proteins while increasing compliant cholesterols.           Relevant Medications    rosuvastatin (CRESTOR) 5 MG Tab    Dulaglutide (TRULICITY) 1.5 MG/0.5ML Solution Pen-injector    Skin lesion     Subacute.  Referral to dermatology placed, urgent due to some concerning signs such as irregular border and enlargement..  Recommended possible biopsy.  If unable to get the dermatology office in a short period of time will recommend           Relevant Orders    Referral to Dermatology    Left elbow pain     Chronic, unstable.  Okay to use gabapentin from home 300 mg to help with the discomfort.  X-ray ordered.  May need possible referral to neurology due to description of nerve pain.           Relevant Orders    DX-ELBOW-COMPLETE 3+ LEFT     Leukopenia     Acute.  Repeat CBC in 3 months           Relevant Orders    CBC WITH DIFFERENTIAL           Followup: Return in about 3 months (around 10/26/2022), or if symptoms worsen or fail to improve, for follow-up.      Please note that this dictation was created using voice recognition software. I have made every reasonable attempt to correct obvious errors, but I expect that there are errors of grammar and possibly content that I did not discover before finalizing the note.

## 2022-07-26 NOTE — ASSESSMENT & PLAN NOTE
Subacute.  Referral to dermatology placed, urgent due to some concerning signs such as irregular border and enlargement..  Recommended possible biopsy.  If unable to get the dermatology office in a short period of time will recommend

## 2022-07-27 DIAGNOSIS — M25.522 LEFT ELBOW PAIN: ICD-10-CM

## 2022-07-27 NOTE — PROGRESS NOTES
Negative imaging, NCS ordered due to continuous nerve pain to the medial aspect of her left elbow.

## 2022-07-28 ENCOUNTER — PATIENT MESSAGE (OUTPATIENT)
Dept: MEDICAL GROUP | Facility: MEDICAL CENTER | Age: 67
End: 2022-07-28
Payer: MEDICARE

## 2022-07-28 DIAGNOSIS — E11.9 TYPE 2 DIABETES MELLITUS WITHOUT COMPLICATION, WITHOUT LONG-TERM CURRENT USE OF INSULIN (HCC): ICD-10-CM

## 2022-07-28 RX ORDER — METFORMIN HYDROCHLORIDE 500 MG/1
500 TABLET, EXTENDED RELEASE ORAL
Qty: 270 TABLET | Refills: 0 | Status: SHIPPED | OUTPATIENT
Start: 2022-07-28 | End: 2022-10-16 | Stop reason: SDUPTHER

## 2022-07-28 NOTE — PATIENT COMMUNICATION
Phone Number Called: [phone]    Call outcome: Left detailed message for patient. Informed to call back with any additional questions.    Message: Called patient to clarify which medication she is inquiring about. Informed patient to call back.

## 2022-08-15 ENCOUNTER — PATIENT MESSAGE (OUTPATIENT)
Dept: MEDICAL GROUP | Facility: MEDICAL CENTER | Age: 67
End: 2022-08-15
Payer: MEDICARE

## 2022-08-15 DIAGNOSIS — E78.5 HYPERLIPIDEMIA DUE TO TYPE 2 DIABETES MELLITUS (HCC): ICD-10-CM

## 2022-08-15 DIAGNOSIS — E11.69 HYPERLIPIDEMIA DUE TO TYPE 2 DIABETES MELLITUS (HCC): ICD-10-CM

## 2022-08-15 RX ORDER — TRAZODONE HYDROCHLORIDE 100 MG/1
100 TABLET ORAL NIGHTLY PRN
Qty: 90 TABLET | Refills: 3 | Status: SHIPPED | OUTPATIENT
Start: 2022-08-15 | End: 2022-10-16 | Stop reason: SDUPTHER

## 2022-08-15 RX ORDER — ROSUVASTATIN CALCIUM 5 MG/1
2.5 TABLET, COATED ORAL
Qty: 8 TABLET | Refills: 1 | Status: SHIPPED | OUTPATIENT
Start: 2022-08-15 | End: 2022-08-19 | Stop reason: SDUPTHER

## 2022-08-20 ENCOUNTER — OFFICE VISIT (OUTPATIENT)
Dept: URGENT CARE | Facility: CLINIC | Age: 67
End: 2022-08-20
Payer: MEDICARE

## 2022-08-20 VITALS
DIASTOLIC BLOOD PRESSURE: 74 MMHG | TEMPERATURE: 97.6 F | BODY MASS INDEX: 26.31 KG/M2 | RESPIRATION RATE: 14 BRPM | WEIGHT: 134 LBS | SYSTOLIC BLOOD PRESSURE: 110 MMHG | HEART RATE: 82 BPM | OXYGEN SATURATION: 98 % | HEIGHT: 60 IN

## 2022-08-20 DIAGNOSIS — J34.89 SINUS PRESSURE: ICD-10-CM

## 2022-08-20 DIAGNOSIS — H69.91 EUSTACHIAN TUBE DYSFUNCTION, RIGHT: ICD-10-CM

## 2022-08-20 PROCEDURE — 99213 OFFICE O/P EST LOW 20 MIN: CPT | Performed by: PHYSICIAN ASSISTANT

## 2022-08-20 RX ORDER — FLUTICASONE PROPIONATE 50 MCG
1 SPRAY, SUSPENSION (ML) NASAL DAILY
Qty: 16 G | Refills: 0 | Status: SHIPPED | OUTPATIENT
Start: 2022-08-20

## 2022-08-20 RX ORDER — GABAPENTIN 100 MG/1
CAPSULE ORAL
COMMUNITY
Start: 2022-08-17 | End: 2022-09-02

## 2022-08-20 ASSESSMENT — ENCOUNTER SYMPTOMS
COUGH: 0
SINUS PRESSURE: 1

## 2022-08-20 ASSESSMENT — FIBROSIS 4 INDEX: FIB4 SCORE: 1.21

## 2022-08-20 NOTE — PROGRESS NOTES
Subjective:   Che Tovar is a 67 y.o. female who presents today with   Chief Complaint   Patient presents with    Sinus Problem     X 3 days, sinus pain head pain when moving it side to side Negative home covid test today.         Sinus Problem  This is a new problem. Episode onset: 3 days. The problem is unchanged. There has been no fever. Associated symptoms include congestion, ear pain and sinus pressure. Pertinent negatives include no coughing. Treatments tried: sudafed, antihistamine. The treatment provided no relief.   Patient states she just had COVID a month ago and did a home COVID test this morning that came back negative.    PMH:  has a past medical history of Allergy, Anesthesia, Anxiety, Diabetes (HCC), High cholesterol, History of gastric bypass (5/7/2012), total hysterectomy (1/15/2015), Pain, PONV (postoperative nausea and vomiting), Psychiatric problem, and Thyroid disease.  MEDS:   Current Outpatient Medications:     fluticasone (FLONASE) 50 MCG/ACT nasal spray, Administer 1 Spray into affected nostril(S) every day., Disp: 16 g, Rfl: 0    rosuvastatin (CRESTOR) 5 MG Tab, Take 0.5 Tablets by mouth every 48 hours for 30 days. Take in the evening time, Disp: 30 Tablet, Rfl: 1    gabapentin (NEURONTIN) 300 MG Cap, Take 1 Capsule by mouth 3 times a day as needed (pain)., Disp: 90 Capsule, Rfl: 5    traZODone (DESYREL) 100 MG Tab, Take 1 Tablet by mouth at bedtime as needed for Sleep., Disp: 90 Tablet, Rfl: 3    metFORMIN ER (GLUCOPHAGE XR) 500 MG TABLET SR 24 HR, Take 1 Tablet by mouth 3 times a day with meals for 90 days., Disp: 270 Tablet, Rfl: 0    Dulaglutide (TRULICITY) 1.5 MG/0.5ML Solution Pen-injector, Inject 0.5 mL under the skin every 7 days., Disp: 2 mL, Rfl: 5    diclofenac sodium (VOLTAREN) 1 % Gel, Apply 2 g topically 4 times a day as needed (apply to affected area as needed)., Disp: 150 g, Rfl: 4    tizanidine (ZANAFLEX) 2 MG tablet, Take 1 Tablet by mouth 3 times a day as  needed., Disp: 60 Tablet, Rfl: 1    levothyroxine (SYNTHROID) 75 MCG Tab, Take 1 Tablet by mouth every morning on an empty stomach., Disp: 90 Tablet, Rfl: 3    CALCIUM PO, Take 2 Tablets by mouth in the morning, at noon, and at bedtime., Disp: , Rfl:     Prenatal Vit-Fe Fumarate-FA (PRENATAL PO), Take 1 tablet by mouth in the morning, at noon, and at bedtime., Disp: , Rfl:     Cholecalciferol (D3 PO), Take 2 Capsules by mouth every day., Disp: , Rfl:     Thiamine HCl (B-1 PO), Take 1 tablet by mouth every day., Disp: , Rfl:     Ascorbic Acid (VITAMIN C) 1000 MG Tab, Take 1,000 mg by mouth every day., Disp: , Rfl:     Cyanocobalamin (VITAMIN B-12 PO), Take 1 tablet by mouth every day., Disp: , Rfl:     ferrous sulfate 325 (65 Fe) MG tablet, Take 325 mg by mouth every 7 days. On Friday, Disp: , Rfl:     COENZYME Q10 PO, Take 1 capsule by mouth every day., Disp: , Rfl:     glucose blood (ONE TOUCH ULTRA TEST) strip, 1 Strip by Other route 3 times a day as needed., Disp: 100 Strip, Rfl: 2    Blood Glucose Monitoring Suppl Device, Meter: Dispense Device of Insurance Preference. Sig. Use as directed for blood sugar monitoring. #1. NR., Disp: 1 Device, Rfl: 0    Lancets, Lancets order: Lancets for One Touch Ultra meter. Sig: use once daily, Disp: 100 Each, Rfl: 3    gabapentin (NEURONTIN) 100 MG Cap, , Disp: , Rfl:   ALLERGIES:   Allergies   Allergen Reactions    Nsaids Unspecified     Other reaction(s): GI Bleeding  Contraindicated after Pierre en Y Gastric Bypass or sleeve gastrectomy  due to  risk ulceration in pouch or sleeve.   Other reaction(s): GI Bleeding  Contraindicated after Pierre en Y Gastric Bypass or sleeve gastrectomy  due to  risk ulceration in pouch or sleeve.   Contraindicated after Pierre en Y Gastric Bypass or sleeve gastrectomy  due to  risk ulceration in pouch or sleeve.     Hydrocodone-Acetaminophen      HIVES W/ LORTAB ONLY  HIVES W/ LORTAB ONLY  HIVES W/ LORTAB ONLY    Lovastatin Unspecified     Other  reaction(s): Muscle Ache  Other reaction(s): Muscle Ache, Unspecified  Other reaction(s): Muscle Ache    Polytrim [Polymyxin B-Trimethoprim]      Eye irritation and itching    Lisinopril Rash and Cough    Penicillins Rash     .  .  HIVES     SURGHX:   Past Surgical History:   Procedure Laterality Date    LUMBAR FUSION O-ARM  6/21/2021    Procedure: FUSION, SPINE, LUMBAR, WITH O-ARM IMAGING GUIDANCE - L5-S1.;  Surgeon: Germaine Bullard M.D.;  Location: SURGERY [facility];  Service: Neurosurgery    ABDOMINAL EXPLORATION      EYE SURGERY      GASTRIC BYPASS LAPAROSCOPIC      HERNIA REPAIR       SOCHX:  reports that she has quit smoking. Her smoking use included cigarettes. She has never used smokeless tobacco. She reports current alcohol use. She reports that she does not use drugs.  FH: Reviewed with patient, not pertinent to this visit.       Review of Systems   HENT:  Positive for congestion, ear pain and sinus pressure.    Respiratory:  Negative for cough.       Objective:   /74   Pulse 82   Temp 36.4 °C (97.6 °F) (Temporal)   Resp 14   Ht 1.524 m (5')   Wt 60.8 kg (134 lb)   SpO2 98%   BMI 26.17 kg/m²   Physical Exam  Vitals and nursing note reviewed.   Constitutional:       General: She is not in acute distress.     Appearance: Normal appearance. She is well-developed. She is not ill-appearing or toxic-appearing.   HENT:      Head: Normocephalic and atraumatic.      Right Ear: Hearing and ear canal normal. A middle ear effusion is present. Tympanic membrane is not erythematous or bulging.      Left Ear: Hearing, tympanic membrane and ear canal normal. Tympanic membrane is not erythematous or bulging.      Nose: Congestion present. No rhinorrhea.      Mouth/Throat:      Pharynx: No oropharyngeal exudate or posterior oropharyngeal erythema.   Eyes:      Pupils: Pupils are equal, round, and reactive to light.   Cardiovascular:      Rate and Rhythm: Normal rate and regular rhythm.      Heart sounds:  Normal heart sounds.   Pulmonary:      Effort: Pulmonary effort is normal.      Breath sounds: Normal breath sounds.   Musculoskeletal:      Comments: Normal movement in all 4 extremities   Skin:     General: Skin is warm and dry.   Neurological:      Mental Status: She is alert.      Coordination: Coordination normal.   Psychiatric:         Mood and Affect: Mood normal.       Assessment/Plan:   Assessment    1. Sinus pressure  - fluticasone (FLONASE) 50 MCG/ACT nasal spray; Administer 1 Spray into affected nostril(S) every day.  Dispense: 16 g; Refill: 0    2. Eustachian tube dysfunction, right  - fluticasone (FLONASE) 50 MCG/ACT nasal spray; Administer 1 Spray into affected nostril(S) every day.  Dispense: 16 g; Refill: 0    Other orders  - gabapentin (NEURONTIN) 100 MG Cap;  (Patient not taking: Reported on 8/20/2022)  Symptoms and presentation are consistent with some sinus pressure and also eustachian tube dysfunction of the right ear.  No indication for antibiotics at this time.  No signs of bacterial infection.  Offered PCR COVID test today for patient declines given that she just had COVID a month ago and her home test this morning was negative.    Differential diagnosis, natural history, supportive care, and indications for immediate follow-up discussed.   Patient given instructions and understanding of medications and treatment.    If not improving in 3-5 days, F/U with PCP or return to  if symptoms worsen.    Patient agreeable to plan.    Please note that this dictation was created using voice recognition software. I have made every reasonable attempt to correct obvious errors, but I expect that there are errors of grammar and possibly content that I did not discover before finalizing the note.    Leif Lopez PA-C

## 2022-08-30 ENCOUNTER — NON-PROVIDER VISIT (OUTPATIENT)
Dept: NEUROLOGY | Facility: MEDICAL CENTER | Age: 67
End: 2022-08-30
Attending: SPECIALIST
Payer: MEDICARE

## 2022-08-30 DIAGNOSIS — M25.522 LEFT ELBOW PAIN: ICD-10-CM

## 2022-08-30 DIAGNOSIS — M79.2 NERVE PAIN: ICD-10-CM

## 2022-08-30 DIAGNOSIS — R20.0 NUMBNESS: ICD-10-CM

## 2022-08-30 DIAGNOSIS — E11.9 TYPE 2 DIABETES MELLITUS WITHOUT RETINOPATHY (HCC): ICD-10-CM

## 2022-08-30 PROCEDURE — 95908 NRV CNDJ TST 3-4 STUDIES: CPT | Performed by: SPECIALIST

## 2022-08-30 PROCEDURE — 95886 MUSC TEST DONE W/N TEST COMP: CPT | Performed by: SPECIALIST

## 2022-08-30 PROCEDURE — 95886 MUSC TEST DONE W/N TEST COMP: CPT | Mod: 26 | Performed by: SPECIALIST

## 2022-08-30 PROCEDURE — 95908 NRV CNDJ TST 3-4 STUDIES: CPT | Mod: 26 | Performed by: SPECIALIST

## 2022-08-30 NOTE — PROCEDURES
NERVE CONDUCTION STUDIES AND ELECTROMYOGRAPHY REPORT        08/30/22      Referring provider: ONELIA Patel      SUMMARY OF PATIENT'S CLINICAL HISTORY,PHYSICAL EXAM, AND RATIONALE FOR TESTING:    Ms. Che Tovar 67 y.o. presenting with history of pain and swelling in the left elbow and numbness in the left hand.    Past Medical History is significant for :   Past Medical History:   Diagnosis Date    Allergy     Anesthesia     Anxiety     Diabetes (HCC)     High cholesterol     History of gastric bypass 5/7/2012    Formatting of this note might be different from the original. Images from the original note were not included.  [facility], 5/2/12 DOS 180lbs Anatomy of the Pierre Y Gastric Bypass:  Patient has an antecolic/antegastric Gastrojejunostomy (GJ) anastomosis  Pierre limb - 100cm    Hx of total hysterectomy 1/15/2015    Pain     HIP AND BACK    PONV (postoperative nausea and vomiting)     Psychiatric problem     DEPRESSION AND ANXIETY    Thyroid disease        A brief general examination was remarkable for tenderness over the left medial epicondyle but not over the left ulnar nerve.    The electrodiagnostic studies were performed to evaluate for possible ulnar neuropathy.      ELECTRODIAGNOSTIC EXAMINATION:  Nerve conduction studies (NCS) and electromyography (EMG) are utilized to evaluate direct or indirect damage to the peripheral nervous system. NCS are performed to measure the nerve(s) response(s) to electrostimulation across a given nerve segment. EMG evaluates the passive and active electrical activity of the muscle(s) in question.  Muscles are innervated by specific peripheral nerves and roots. Often times, several nerves the muscle to be examined in order to determine the presence or absence of the disease process. Furthermore, nerves and muscles may need to be tested in a [id number] comparison, as well as in additional extremities, as this may be crucial in characterizing the  extent of the disease process, which may be diffuse or isolated and of varying degree of severity. The extent of the neurodiagnostic exam is justified as it may help arrive to a proper diagnosis, which ultimately may contribute to better management of the patient. Therefore, the nerves to muscles examined during the study were medically necessary.    Unless otherwise noted, temperature of the extremity(s) study was monitored before and during the examination and remained between 32 and 36 degrees C for the upper extremities, and between 30 and 36 degrees C for the lower extremities.      NERVE CONDUCTION STUDIES:  Sensory nerves:   -Left median sensory nerve was examined.The response was within normal limits.  -Left ulnar sensory nerve was examined. The response was within normal limits.    Motor nerves:   -Left median motor nerve was examined. Recording electrodes placed at the Abductor Pollicis Brevis muscles. The response was within normal limits.  -Left ulnar motor nerve was examined. Recording electrodes placed at the Abductor Digiti Minimi muscles. The response was abnormal.  Onset latency was within normal limits, amplitude was decreased to 5.2 mV and conduction velocity was within normal limits without slowing across the elbow.      ELECTROMYOGRAPHY:  The study was performed the concentric needle electrode. Fibrillation and fasciculation activity is graded by convention from none (0) to continuous (4+).  Needle electrode examination was performed in the following muscles: Left deltoid, biceps, triceps, first dorsal interosseous, abductor pollicis brevis.  No acute denervation changes were noted, there was no increased insertional activity fibrillation potentials or positive sharp waves.  Mild chronic neuropathic changes with decreased recruitment of increased amplitude potentials was noted in the first dorsal interosseous muscle only.        Nerve Conduction Studies     Stim Site NR Onset (ms) Norm Onset  (ms) O-P Amp (µV) Norm O-P Amp Site1 Site2 Delta-P (ms) Dist (cm) Jono (m/s) Norm Jono (m/s)   Left Median Anti Sensory (2nd Digit)   Wrist    2.4 <3.8 30.2 >10 Wrist 2nd Digit 3.2 14.0 *44 >50   Left Ulnar Anti Sensory (5th Digit)   Wrist    1.8 <3.2 29.5 >5 Wrist 5th Digit 2.7 14.0 52 >50        Stim Site NR Onset (ms) Norm Onset (ms) O-P Amp (mV) Norm O-P Amp Site1 Site2 Delta-0 (ms) Dist (cm) Jono (m/s) Norm Jono (m/s)   Left Median Motor (Abd Poll Brev)   Wrist    3.3 <4 8.9 >5 Elbow Wrist 3.8 24.0 63 >50   Elbow    7.1  8.1          Left Ulnar Motor (Abd Dig Min)   Wrist    2.3 <3.1 *5.2 >7 B Elbow Wrist 3.2 18.0 56 >50   B Elbow    5.5  5.2  A Elbow B Elbow 1.3 10.0 77    A Elbow    6.8  5.0                        Electromyography     Side Muscle Nerve Root Ins Act Fibs Psw Amp Dur Poly Recrt Int Pat Comment   Left Deltoid Axillary C5-6 Nml Nml Nml Nml Nml 0 Nml Nml    Left Biceps Musculocut C5-6 Nml Nml Nml Nml Nml 0 Nml Nml    Left Triceps Radial C6-7-8 Nml Nml Nml Nml Nml 0 Nml Nml    Left 1stDorInt Ulnar C8-T1 Nml Nml Nml *Incr Nml 0 *Reduced *75%    Left Abd Poll Brev Median C8-T1 Nml Nml Nml Nml Nml 0 Nml Nml            DIAGNOSTIC INTERPRETATION:   Extensive electrodiagnostic studies were performed to the left upper extremity.  The results are as follows:    1.  Left ulnar neuropathy with a decreased amplitude response without slowing of conduction across the elbow and with mild chronic neuropathic changes in left ulnar nerve supplied hand muscles.    2.  Normal left median nerve motor and sensory conduction studies.    3.  No evidence of radiculopathy in selected muscles studied left upper extremity.    CLINICAL DISCUSSION:   The patient reports she initially noted pain in her medial left elbow and hand numbness a year ago when she was recovering from a lumbar spine surgery.  She may well have developed an ulnar neuropathy due to compression of the nerve at that time but no longer has slowing across the  elbow.  She does still have a residual low amplitude response and mild chronic neuropathic changes in ulnar nerve supplied hand muscles.  She has tenderness over the medial epicondyles but not over the ulnar groove.      CHRISTINE Sawyer M.D. (No note.)

## 2022-09-02 ENCOUNTER — OFFICE VISIT (OUTPATIENT)
Dept: MEDICAL GROUP | Facility: MEDICAL CENTER | Age: 67
End: 2022-09-02
Payer: MEDICARE

## 2022-09-02 VITALS
DIASTOLIC BLOOD PRESSURE: 62 MMHG | BODY MASS INDEX: 26.35 KG/M2 | WEIGHT: 134.2 LBS | SYSTOLIC BLOOD PRESSURE: 112 MMHG | TEMPERATURE: 97.3 F | OXYGEN SATURATION: 98 % | HEART RATE: 69 BPM | HEIGHT: 60 IN

## 2022-09-02 DIAGNOSIS — Z23 IMMUNIZATION DUE: ICD-10-CM

## 2022-09-02 DIAGNOSIS — R20.2 TINGLING: ICD-10-CM

## 2022-09-02 DIAGNOSIS — E11.69 HYPERLIPIDEMIA DUE TO TYPE 2 DIABETES MELLITUS (HCC): ICD-10-CM

## 2022-09-02 DIAGNOSIS — E78.5 HYPERLIPIDEMIA DUE TO TYPE 2 DIABETES MELLITUS (HCC): ICD-10-CM

## 2022-09-02 DIAGNOSIS — M25.522 LEFT ELBOW PAIN: ICD-10-CM

## 2022-09-02 PROCEDURE — 90677 PCV20 VACCINE IM: CPT | Performed by: FAMILY MEDICINE

## 2022-09-02 PROCEDURE — G0009 ADMIN PNEUMOCOCCAL VACCINE: HCPCS | Performed by: FAMILY MEDICINE

## 2022-09-02 PROCEDURE — 99213 OFFICE O/P EST LOW 20 MIN: CPT | Mod: 25 | Performed by: FAMILY MEDICINE

## 2022-09-02 RX ORDER — ALPRAZOLAM 0.25 MG/1
0.25 TABLET ORAL ONCE
Qty: 1 TABLET | Refills: 0 | Status: SHIPPED | OUTPATIENT
Start: 2022-09-02 | End: 2022-09-02

## 2022-09-02 RX ORDER — ROSUVASTATIN CALCIUM 5 MG/1
5 TABLET, COATED ORAL EVERY EVENING
Qty: 90 TABLET | Refills: 0 | Status: SHIPPED | OUTPATIENT
Start: 2022-09-02 | End: 2022-11-30

## 2022-09-02 ASSESSMENT — ENCOUNTER SYMPTOMS
WEIGHT LOSS: 0
COUGH: 0
CHILLS: 0
SHORTNESS OF BREATH: 0
MYALGIAS: 0
FEVER: 0
ABDOMINAL PAIN: 0
DEPRESSION: 0
WEAKNESS: 0
DIZZINESS: 0
PALPITATIONS: 0
TINGLING: 1

## 2022-09-02 ASSESSMENT — FIBROSIS 4 INDEX: FIB4 SCORE: 1.21

## 2022-09-02 NOTE — PROGRESS NOTES
Che Tovar is a pleasant 67 y.o. female here for   Chief Complaint   Patient presents with    Lab Results     EMG    Numbness     Right hand fingers. Tingly       HPI:    Problem   Tingling    Right hand all finger tips x 1 month.  Only in the tips of her fingers.  Denies any neck pain or shoulder discomfort.     Left Elbow Pain    Developed left elbow pain and swelling x1 year ago.  Worsening over the last 2 months, but pain has been pretty consistent.  Did mention this to previous provider and it was thought to be increase in fatty deposits.  Describes the pain as burning sensation or as if she struck her elbow on something.  Denies any injuries or repetition.    Completed EMG which showed that the discomfort was not associated with poor conduction.  Gabapentin is helping with the discomfort, noticed some decrease in swelling to the left medial aspect of her elbow.     Hyperlipidemia Due to Type 2 Diabetes Mellitus (Hcc)    Taking rosuvastatin 5 mg every evening, needed new prescription for this medication.  Denies any myalgias or difficulty with this medication.            Current Medicines (including changes today)  Current Outpatient Medications   Medication Sig Dispense Refill    rosuvastatin (CRESTOR) 5 MG Tab Take 1 Tablet by mouth every evening for 90 days. Take in the evening time 90 Tablet 0    ALPRAZolam (XANAX) 0.25 MG Tab Take 1 Tablet by mouth one time for 1 dose. To be taken 15 minutes prior to her MRI 1 Tablet 0    fluticasone (FLONASE) 50 MCG/ACT nasal spray Administer 1 Spray into affected nostril(S) every day. 16 g 0    gabapentin (NEURONTIN) 300 MG Cap Take 1 Capsule by mouth 3 times a day as needed (pain). 90 Capsule 5    traZODone (DESYREL) 100 MG Tab Take 1 Tablet by mouth at bedtime as needed for Sleep. 90 Tablet 3    metFORMIN ER (GLUCOPHAGE XR) 500 MG TABLET SR 24 HR Take 1 Tablet by mouth 3 times a day with meals for 90 days. 270 Tablet 0    Dulaglutide (TRULICITY) 1.5 MG/0.5ML  Solution Pen-injector Inject 0.5 mL under the skin every 7 days. 2 mL 5    diclofenac sodium (VOLTAREN) 1 % Gel Apply 2 g topically 4 times a day as needed (apply to affected area as needed). 150 g 4    tizanidine (ZANAFLEX) 2 MG tablet Take 1 Tablet by mouth 3 times a day as needed. 60 Tablet 1    levothyroxine (SYNTHROID) 75 MCG Tab Take 1 Tablet by mouth every morning on an empty stomach. 90 Tablet 3    CALCIUM PO Take 2 Tablets by mouth in the morning, at noon, and at bedtime.      Prenatal Vit-Fe Fumarate-FA (PRENATAL PO) Take 1 tablet by mouth in the morning, at noon, and at bedtime.      Cholecalciferol (D3 PO) Take 2 Capsules by mouth every day.      Thiamine HCl (B-1 PO) Take 1 tablet by mouth every day.      Ascorbic Acid (VITAMIN C) 1000 MG Tab Take 1,000 mg by mouth every day.      Cyanocobalamin (VITAMIN B-12 PO) Take 1 tablet by mouth every day.      ferrous sulfate 325 (65 Fe) MG tablet Take 325 mg by mouth every 7 days. On Friday      COENZYME Q10 PO Take 1 capsule by mouth every day.      glucose blood (ONE TOUCH ULTRA TEST) strip 1 Strip by Other route 3 times a day as needed. 100 Strip 2    Blood Glucose Monitoring Suppl Device Meter: Dispense Device of Insurance Preference. Sig. Use as directed for blood sugar monitoring. #1. NR. 1 Device 0    Lancets Lancets order: Lancets for One Touch Ultra meter. Sig: use once daily 100 Each 3     No current facility-administered medications for this visit.     Past Medical/ Surgical History  She  has a past medical history of Allergy, Anesthesia, Anxiety, Diabetes (HCC), High cholesterol, History of gastric bypass (5/7/2012), total hysterectomy (1/15/2015), Pain, PONV (postoperative nausea and vomiting), Psychiatric problem, and Thyroid disease.  She  has a past surgical history that includes abdominal exploration; eye surgery; hernia repair; gastric bypass laparoscopic; and lumbar fusion o-arm (6/21/2021).    Review of Systems   Constitutional:  Negative  for chills, fever, malaise/fatigue and weight loss.   Respiratory:  Negative for cough and shortness of breath.    Cardiovascular:  Negative for chest pain and palpitations.   Gastrointestinal:  Negative for abdominal pain.   Genitourinary: Negative.    Musculoskeletal:  Positive for joint pain (Left elbow pain). Negative for myalgias.   Skin:  Negative for rash.   Neurological:  Positive for tingling (Right hand fingertips). Negative for dizziness and weakness.   Psychiatric/Behavioral:  Negative for depression.        Objective:     /62 (BP Location: Left arm, Patient Position: Sitting, BP Cuff Size: Adult)   Pulse 69   Temp 36.3 °C (97.3 °F) (Temporal)   Ht 1.524 m (5')   Wt 60.9 kg (134 lb 3.2 oz)   SpO2 98%  Body mass index is 26.21 kg/m².    Physical Exam  Constitutional:       General: She is not in acute distress.  HENT:      Head: Normocephalic and atraumatic.   Eyes:      Conjunctiva/sclera: Conjunctivae normal.      Pupils: Pupils are equal, round, and reactive to light.   Pulmonary:      Effort: Pulmonary effort is normal. No respiratory distress.   Abdominal:      General: There is no distension.   Musculoskeletal:      Cervical back: Normal range of motion and neck supple.   Skin:     General: Skin is warm and dry.      Findings: No rash.   Neurological:      Mental Status: She is alert and oriented to person, place, and time.      Gait: Gait is intact.   Psychiatric:         Mood and Affect: Affect normal.        [id number] EMG:  Extensive electrodiagnostic studies were performed to the left upper extremity.  The results are as follows:    1.  Left ulnar neuropathy with a decreased amplitude response without slowing of conduction across the elbow and with mild chronic neuropathic changes in left ulnar nerve supplied hand muscles.    2.  Normal left median nerve motor and sensory conduction studies.    3.  No evidence of radiculopathy in selected muscles studied left upper  extremity.    Labs  No recent labs to review  Assessment and Plan:   The following treatment plan was discussed    Problem List Items Addressed This Visit       Hyperlipidemia due to type 2 diabetes mellitus (HCC)     Chronic, stable.  Continue with statin 5 mg every evening.  Repeat lipid profile in October.         Relevant Medications    rosuvastatin (CRESTOR) 5 MG Tab    Other Relevant Orders    Lipid Profile    Left elbow pain     Chronic, stable.  Continue gabapentin  Consider MRI of left elbow.  Referral to orthopedics placed for the patient.         Relevant Medications    ALPRAZolam (XANAX) 0.25 MG Tab    Other Relevant Orders    Referral to Orthopedics    MR-ELBOW-W/O LEFT    Tingling     Acute.  Suspect cervical radiculopathy, recommend ibuprofen 600 mg to help decrease inflammation.  Continue to watch for progression.          Other Visit Diagnoses       Immunization due        Relevant Orders    Pneumococcal Conjugate Vaccine 20-Valent (19 yrs+) (Completed)             Followup: Return in about 2 months (around 11/2/2022), or if symptoms worsen or fail to improve.    I have placed vaccination orders.  The MA is preforming vaccination orders under the direction of Dr. Davidson    Please note that this dictation was created using voice recognition software. I have made every reasonable attempt to correct obvious errors, but I expect that there are errors of grammar and possibly content that I did not discover before finalizing the note.

## 2022-09-02 NOTE — ASSESSMENT & PLAN NOTE
Chronic, stable.  Continue gabapentin  Consider MRI of left elbow.  Referral to orthopedics placed for the patient.

## 2022-09-02 NOTE — ASSESSMENT & PLAN NOTE
Acute.  Suspect cervical radiculopathy, recommend ibuprofen 600 mg to help decrease inflammation.  Continue to watch for progression.

## 2022-09-09 NOTE — PATIENT COMMUNICATION
Was the patient seen in the last year in this department? Yes    Does patient have an active prescription for medications requested? No     Received Request Via: PATIENT    Fall Risk

## 2022-09-13 ENCOUNTER — APPOINTMENT (OUTPATIENT)
Dept: RADIOLOGY | Facility: MEDICAL CENTER | Age: 67
End: 2022-09-13
Attending: FAMILY MEDICINE
Payer: MEDICARE

## 2022-09-15 ENCOUNTER — APPOINTMENT (OUTPATIENT)
Dept: RADIOLOGY | Facility: MEDICAL CENTER | Age: 67
End: 2022-09-15
Attending: FAMILY MEDICINE
Payer: MEDICARE

## 2022-09-15 DIAGNOSIS — M25.522 LEFT ELBOW PAIN: ICD-10-CM

## 2022-09-15 PROCEDURE — 73221 MRI JOINT UPR EXTREM W/O DYE: CPT | Mod: LT

## 2022-10-16 DIAGNOSIS — E11.9 TYPE 2 DIABETES MELLITUS WITHOUT RETINOPATHY (HCC): ICD-10-CM

## 2022-10-16 DIAGNOSIS — E11.9 TYPE 2 DIABETES MELLITUS WITHOUT COMPLICATION, WITHOUT LONG-TERM CURRENT USE OF INSULIN (HCC): ICD-10-CM

## 2022-10-16 RX ORDER — DULAGLUTIDE 1.5 MG/.5ML
0.5 INJECTION, SOLUTION SUBCUTANEOUS
Qty: 2 ML | Refills: 5 | Status: SHIPPED | OUTPATIENT
Start: 2022-10-16 | End: 2023-03-06 | Stop reason: SDUPTHER

## 2022-10-16 RX ORDER — TRAZODONE HYDROCHLORIDE 100 MG/1
100 TABLET ORAL NIGHTLY PRN
Qty: 90 TABLET | Refills: 3 | Status: SHIPPED | OUTPATIENT
Start: 2022-10-16 | End: 2022-12-15 | Stop reason: SDUPTHER

## 2022-10-16 RX ORDER — METFORMIN HYDROCHLORIDE 500 MG/1
500 TABLET, EXTENDED RELEASE ORAL
Qty: 270 TABLET | Refills: 0 | Status: SHIPPED | OUTPATIENT
Start: 2022-10-16 | End: 2023-01-19

## 2022-10-27 ENCOUNTER — APPOINTMENT (OUTPATIENT)
Dept: MEDICAL GROUP | Facility: MEDICAL CENTER | Age: 67
End: 2022-10-27
Payer: MEDICARE

## 2022-10-31 ENCOUNTER — HOSPITAL ENCOUNTER (OUTPATIENT)
Dept: LAB | Facility: MEDICAL CENTER | Age: 67
End: 2022-10-31
Attending: FAMILY MEDICINE
Payer: MEDICARE

## 2022-10-31 ENCOUNTER — APPOINTMENT (OUTPATIENT)
Dept: MEDICAL GROUP | Facility: MEDICAL CENTER | Age: 67
End: 2022-10-31
Payer: MEDICARE

## 2022-10-31 DIAGNOSIS — E11.9 TYPE 2 DIABETES MELLITUS WITHOUT RETINOPATHY (HCC): ICD-10-CM

## 2022-10-31 DIAGNOSIS — D72.819 LEUKOPENIA, UNSPECIFIED TYPE: ICD-10-CM

## 2022-10-31 DIAGNOSIS — E11.69 HYPERLIPIDEMIA DUE TO TYPE 2 DIABETES MELLITUS (HCC): ICD-10-CM

## 2022-10-31 DIAGNOSIS — E78.5 HYPERLIPIDEMIA DUE TO TYPE 2 DIABETES MELLITUS (HCC): ICD-10-CM

## 2022-10-31 LAB
BASOPHILS # BLD AUTO: 1.2 % (ref 0–1.8)
BASOPHILS # BLD: 0.05 K/UL (ref 0–0.12)
CHOLEST SERPL-MCNC: 171 MG/DL (ref 100–199)
EOSINOPHIL # BLD AUTO: 0.1 K/UL (ref 0–0.51)
EOSINOPHIL NFR BLD: 2.4 % (ref 0–6.9)
ERYTHROCYTE [DISTWIDTH] IN BLOOD BY AUTOMATED COUNT: 43.5 FL (ref 35.9–50)
EST. AVERAGE GLUCOSE BLD GHB EST-MCNC: 123 MG/DL
FASTING STATUS PATIENT QL REPORTED: NORMAL
HBA1C MFR BLD: 5.9 % (ref 4–5.6)
HCT VFR BLD AUTO: 39.7 % (ref 37–47)
HDLC SERPL-MCNC: 78 MG/DL
HGB BLD-MCNC: 13.1 G/DL (ref 12–16)
IMM GRANULOCYTES # BLD AUTO: 0 K/UL (ref 0–0.11)
IMM GRANULOCYTES NFR BLD AUTO: 0 % (ref 0–0.9)
LDLC SERPL CALC-MCNC: 81 MG/DL
LYMPHOCYTES # BLD AUTO: 1.44 K/UL (ref 1–4.8)
LYMPHOCYTES NFR BLD: 34.8 % (ref 22–41)
MCH RBC QN AUTO: 30.9 PG (ref 27–33)
MCHC RBC AUTO-ENTMCNC: 33 G/DL (ref 33.6–35)
MCV RBC AUTO: 93.6 FL (ref 81.4–97.8)
MONOCYTES # BLD AUTO: 0.36 K/UL (ref 0–0.85)
MONOCYTES NFR BLD AUTO: 8.7 % (ref 0–13.4)
NEUTROPHILS # BLD AUTO: 2.19 K/UL (ref 2–7.15)
NEUTROPHILS NFR BLD: 52.9 % (ref 44–72)
NRBC # BLD AUTO: 0 K/UL
NRBC BLD-RTO: 0 /100 WBC
PLATELET # BLD AUTO: 217 K/UL (ref 164–446)
PMV BLD AUTO: 11.3 FL (ref 9–12.9)
RBC # BLD AUTO: 4.24 M/UL (ref 4.2–5.4)
TRIGL SERPL-MCNC: 59 MG/DL (ref 0–149)
WBC # BLD AUTO: 4.1 K/UL (ref 4.8–10.8)

## 2022-10-31 PROCEDURE — 80061 LIPID PANEL: CPT

## 2022-10-31 PROCEDURE — 36415 COLL VENOUS BLD VENIPUNCTURE: CPT

## 2022-10-31 PROCEDURE — 83036 HEMOGLOBIN GLYCOSYLATED A1C: CPT | Mod: GA

## 2022-10-31 PROCEDURE — 85025 COMPLETE CBC W/AUTO DIFF WBC: CPT

## 2022-11-01 ENCOUNTER — OFFICE VISIT (OUTPATIENT)
Dept: MEDICAL GROUP | Facility: MEDICAL CENTER | Age: 67
End: 2022-11-01
Payer: MEDICARE

## 2022-11-01 VITALS
HEART RATE: 80 BPM | HEIGHT: 61 IN | TEMPERATURE: 97.3 F | DIASTOLIC BLOOD PRESSURE: 74 MMHG | SYSTOLIC BLOOD PRESSURE: 117 MMHG | OXYGEN SATURATION: 96 % | BODY MASS INDEX: 26.51 KG/M2 | WEIGHT: 140.4 LBS

## 2022-11-01 DIAGNOSIS — F51.01 PRIMARY INSOMNIA: ICD-10-CM

## 2022-11-01 DIAGNOSIS — E78.5 HYPERLIPIDEMIA DUE TO TYPE 2 DIABETES MELLITUS (HCC): ICD-10-CM

## 2022-11-01 DIAGNOSIS — E11.9 TYPE 2 DIABETES MELLITUS WITHOUT RETINOPATHY (HCC): ICD-10-CM

## 2022-11-01 DIAGNOSIS — Z23 IMMUNIZATION DUE: ICD-10-CM

## 2022-11-01 DIAGNOSIS — E03.9 ACQUIRED HYPOTHYROIDISM: ICD-10-CM

## 2022-11-01 DIAGNOSIS — E11.69 HYPERLIPIDEMIA DUE TO TYPE 2 DIABETES MELLITUS (HCC): ICD-10-CM

## 2022-11-01 DIAGNOSIS — M25.511 ACUTE PAIN OF RIGHT SHOULDER: ICD-10-CM

## 2022-11-01 PROBLEM — R20.2 TINGLING: Status: RESOLVED | Noted: 2022-09-02 | Resolved: 2022-11-01

## 2022-11-01 PROBLEM — M25.551 RIGHT HIP PAIN: Status: RESOLVED | Noted: 2018-05-25 | Resolved: 2022-11-01

## 2022-11-01 PROBLEM — Z98.890 H/O SIGMOIDOSCOPY: Status: RESOLVED | Noted: 2017-08-17 | Resolved: 2022-11-01

## 2022-11-01 PROBLEM — Z87.39 HISTORY OF GOUT: Status: RESOLVED | Noted: 2017-08-17 | Resolved: 2022-11-01

## 2022-11-01 PROBLEM — K92.1 BLOODY STOOL: Status: RESOLVED | Noted: 2022-03-22 | Resolved: 2022-11-01

## 2022-11-01 PROBLEM — M25.552 LEFT HIP PAIN: Status: RESOLVED | Noted: 2018-09-18 | Resolved: 2022-11-01

## 2022-11-01 PROCEDURE — 90662 IIV NO PRSV INCREASED AG IM: CPT | Performed by: FAMILY MEDICINE

## 2022-11-01 PROCEDURE — G0008 ADMIN INFLUENZA VIRUS VAC: HCPCS | Performed by: FAMILY MEDICINE

## 2022-11-01 PROCEDURE — 99214 OFFICE O/P EST MOD 30 MIN: CPT | Mod: 25 | Performed by: FAMILY MEDICINE

## 2022-11-01 RX ORDER — ALPRAZOLAM 0.25 MG/1
TABLET ORAL
COMMUNITY
Start: 2022-09-02 | End: 2022-11-01

## 2022-11-01 ASSESSMENT — ENCOUNTER SYMPTOMS
BLURRED VISION: 0
VOMITING: 0
CHILLS: 0
NAUSEA: 0
SORE THROAT: 0
CONSTIPATION: 0
PALPITATIONS: 0
WEAKNESS: 0
COUGH: 0
DIARRHEA: 0
SHORTNESS OF BREATH: 0
TINGLING: 0
DIZZINESS: 0
BRUISES/BLEEDS EASILY: 0
ABDOMINAL PAIN: 0
WEIGHT LOSS: 0
MYALGIAS: 0
DOUBLE VISION: 0
DEPRESSION: 0
NERVOUS/ANXIOUS: 0
FEVER: 0

## 2022-11-01 ASSESSMENT — FIBROSIS 4 INDEX: FIB4 SCORE: 1.37

## 2022-11-01 NOTE — ASSESSMENT & PLAN NOTE
Acute.  Suspect muscular discomfort.  Recommended continue to use Celebrex to decrease inflammation.  Initial stretches and exercises.  Briefly discussed with there is no improvement in pain we may consider getting imaging and referral to physical therapy.

## 2022-11-01 NOTE — PROGRESS NOTES
Che Tovar is a pleasant 67 y.o. female here for   Chief Complaint   Patient presents with    Lab Results    Back Pain     Right shoulder blade, x 2-3 days , taking IBP       HPI:   Problem   Shoulder Pain, Right    Onset: sudden, woke up and pain present.  Location: Posterior shoulder along shoulder blade  Duration: x 2 days  Character: Sharp  Associated symptoms: None  Relieving factors: Ibuprofen  Exacerbating factors: Moving the arm worsens the pain  Severity: 5/10    Was carrying a heavy backpack, denies any trauma.       Primary Insomnia    Insomnia stable on trazodone 100 mg every evening  Does not require refill at this time     Type 2 Diabetes Mellitus Without Retinopathy (Hcc)    Currently taking Trulicity 1.5 mg every 7 days, metformin 500 mg 3 times daily.     Latest Reference Range & Units 8/13/21 07:31 7/21/22 08:20 10/31/22 08:45   Glycohemoglobin 4.0 - 5.6 % 6.3 (H) 6.0 (H) 5.9 (H)   (H): Data is abnormally high     Hyperlipidemia Due to Type 2 Diabetes Mellitus (Hcc)    Taking rosuvastatin 5 mg every evening.  Denies any myalgias or difficulty with this medication.     Latest Reference Range & Units 10/31/22 08:45   Cholesterol,Tot 100 - 199 mg/dL 171   Triglycerides 0 - 149 mg/dL 59   HDL >=40 mg/dL 78   LDL <100 mg/dL 81      Hypothyroidism    Continues to take levothyroxine 75 mcg daily.  Has not had her TSH level checked in some time.     Tingling (Resolved)    Right hand all finger tips x 1 month.  Only in the tips of her fingers.  Denies any neck pain or shoulder discomfort.     Bloody Stool (Resolved)   Left Hip Pain (Resolved)   Right Hip Pain (Resolved)   History of Gout (Resolved)   H/O Sigmoidoscopy (Resolved)   Chronic Cough (Resolved)   Hx of Total Hysterectomy (Resolved)   Htn (Hypertension) (Resolved)          Current Medicines (including changes today)  Current Outpatient Medications   Medication Sig Dispense Refill    Dulaglutide (TRULICITY) 1.5 MG/0.5ML Solution  Pen-injector Inject 0.5 mL under the skin every 7 days. 2 mL 5    metFORMIN ER (GLUCOPHAGE XR) 500 MG TABLET SR 24 HR Take 1 Tablet by mouth 3 times a day with meals for 90 days. 270 Tablet 0    traZODone (DESYREL) 100 MG Tab Take 1 Tablet by mouth at bedtime as needed for Sleep. 90 Tablet 3    celecoxib (CELEBREX) 200 MG Cap Take 1 Capsule by mouth 2 times a day. 60 Capsule 1    rosuvastatin (CRESTOR) 5 MG Tab Take 1 Tablet by mouth every evening for 90 days. Take in the evening time 90 Tablet 0    fluticasone (FLONASE) 50 MCG/ACT nasal spray Administer 1 Spray into affected nostril(S) every day. 16 g 0    gabapentin (NEURONTIN) 300 MG Cap Take 1 Capsule by mouth 3 times a day as needed (pain). 90 Capsule 5    diclofenac sodium (VOLTAREN) 1 % Gel Apply 2 g topically 4 times a day as needed (apply to affected area as needed). 150 g 4    tizanidine (ZANAFLEX) 2 MG tablet Take 1 Tablet by mouth 3 times a day as needed. 60 Tablet 1    levothyroxine (SYNTHROID) 75 MCG Tab Take 1 Tablet by mouth every morning on an empty stomach. 90 Tablet 3    CALCIUM PO Take 2 Tablets by mouth in the morning, at noon, and at bedtime.      Prenatal Vit-Fe Fumarate-FA (PRENATAL PO) Take 1 tablet by mouth in the morning, at noon, and at bedtime.      Cholecalciferol (D3 PO) Take 2 Capsules by mouth every day.      Thiamine HCl (B-1 PO) Take 1 tablet by mouth every day.      Ascorbic Acid (VITAMIN C) 1000 MG Tab Take 1,000 mg by mouth every day.      Cyanocobalamin (VITAMIN B-12 PO) Take 1 tablet by mouth every day.      ferrous sulfate 325 (65 Fe) MG tablet Take 325 mg by mouth every 7 days. On Friday      COENZYME Q10 PO Take 1 capsule by mouth every day.      glucose blood (ONE TOUCH ULTRA TEST) strip 1 Strip by Other route 3 times a day as needed. 100 Strip 2    Blood Glucose Monitoring Suppl Device Meter: Dispense Device of Insurance Preference. Sig. Use as directed for blood sugar monitoring. #1. NR. 1 Device 0    Lancets Lancets  "order: Lancets for One Touch Ultra meter. Sig: use once daily 100 Each 3     No current facility-administered medications for this visit.     Past Medical/ Surgical History  She  has a past medical history of Allergy, Anesthesia, Anxiety, Diabetes (HCC), High cholesterol, History of gastric bypass (5/7/2012), History of gout (8/17/2017), total hysterectomy (1/15/2015), total hysterectomy (1/15/2015), Pain, PONV (postoperative nausea and vomiting), Psychiatric problem, and Thyroid disease.  She  has a past surgical history that includes abdominal exploration; eye surgery; hernia repair; gastric bypass laparoscopic; and lumbar fusion o-arm (6/21/2021).    Review of Systems   Constitutional:  Negative for chills, fever, malaise/fatigue and weight loss.   HENT:  Negative for hearing loss and sore throat.    Eyes:  Negative for blurred vision and double vision.   Respiratory:  Negative for cough and shortness of breath.    Cardiovascular:  Negative for chest pain, palpitations and leg swelling.   Gastrointestinal:  Negative for abdominal pain, constipation, diarrhea, nausea and vomiting.   Musculoskeletal:  Negative for myalgias.        Posterior shoulder blade pain   Skin:  Negative for rash.   Neurological:  Negative for dizziness, tingling and weakness.   Endo/Heme/Allergies:  Does not bruise/bleed easily.   Psychiatric/Behavioral:  Negative for depression. The patient is not nervous/anxious.       Objective:     /74 (BP Location: Left arm, Patient Position: Sitting, BP Cuff Size: Adult)   Pulse 80   Temp 36.3 °C (97.3 °F) (Temporal)   Ht 1.56 m (5' 1.42\")   Wt 63.7 kg (140 lb 6.4 oz)   SpO2 96%  Body mass index is 26.17 kg/m².    Physical Exam  Constitutional:       General: She is not in acute distress.  HENT:      Head: Normocephalic and atraumatic.      Right Ear: Tympanic membrane and external ear normal.      Left Ear: Tympanic membrane and external ear normal.   Eyes:      General: Lids are normal. "      Extraocular Movements: Extraocular movements intact.      Conjunctiva/sclera: Conjunctivae normal.      Pupils: Pupils are equal, round, and reactive to light.   Neck:      Trachea: Trachea normal.   Cardiovascular:      Rate and Rhythm: Normal rate and regular rhythm.      Heart sounds: Normal heart sounds. No murmur heard.    No friction rub. No gallop.   Pulmonary:      Effort: Pulmonary effort is normal. No accessory muscle usage.      Breath sounds: Normal breath sounds. No wheezing or rales.   Abdominal:      General: Bowel sounds are normal.      Palpations: Abdomen is soft.      Tenderness: There is no abdominal tenderness.   Musculoskeletal:      Right shoulder: Tenderness (Along the medial subscapularis) present. No crepitus. Normal strength.      Cervical back: Normal range of motion and neck supple.      Right lower leg: No edema.      Left lower leg: No edema.   Lymphadenopathy:      Cervical: No cervical adenopathy.   Skin:     General: Skin is warm and dry.      Findings: No rash.   Neurological:      General: No focal deficit present.      Mental Status: She is alert and oriented to person, place, and time. Mental status is at baseline.      GCS: GCS eye subscore is 4. GCS verbal subscore is 5. GCS motor subscore is 6.      Gait: Gait is intact.      Deep Tendon Reflexes:      Reflex Scores:       Brachioradialis reflexes are 2+ on the right side and 2+ on the left side.       Patellar reflexes are 2+ on the right side and 2+ on the left side.  Psychiatric:         Attention and Perception: Attention normal.         Mood and Affect: Mood and affect normal.         Speech: Speech normal.        Imaging:  No imaging    Labs  Recent labs from 10/31/2022 reviewed with the patient.    Assessment and Plan:   The following treatment plan was discussed     Problem List Items Addressed This Visit       Primary insomnia     Chronic, stable.  Continue trazodone 100 mg every evening.         Type 2 diabetes  mellitus without retinopathy (HCC)     Chronic, stable.  Continue taking Trulicity metformin.  No refills of these medications are needed at this time.         Relevant Orders    HEMOGLOBIN A1C    Comp Metabolic Panel    Hypothyroidism     Chronic, stable.  Continue levothyroxine 75 mcg daily.  Check TSH level         Relevant Orders    TSH WITH REFLEX TO FT4    Hyperlipidemia due to type 2 diabetes mellitus (HCC)     Chronic, Stable.  Continue rosuvastatin 5 mg every evening.  Check cholesterol panel annually         Shoulder pain, right     Acute.  Suspect muscular discomfort.  Recommended continue to use Celebrex to decrease inflammation.  Initial stretches and exercises.  Briefly discussed with there is no improvement in pain we may consider getting imaging and referral to physical therapy.          Other Visit Diagnoses       Immunization due        Relevant Orders    INFLUENZA VACCINE, HIGH DOSE (65+ ONLY) (Completed)             Followup: Return in about 6 months (around 5/1/2023), or if symptoms worsen or fail to improve, for Labs.    I have placed vaccination orders.  The MA is preforming vaccination orders under the direction of Dr. Davidson    Please note that this dictation was created using voice recognition software. I have made every reasonable attempt to correct obvious errors, but I expect that there are errors of grammar and possibly content that I did not discover before finalizing the note.

## 2022-11-01 NOTE — ASSESSMENT & PLAN NOTE
Chronic, stable.  Continue taking Trulicity metformin.  No refills of these medications are needed at this time.

## 2022-11-01 NOTE — LETTER
[facility]  MAYRA PatelP.RJameyN.  [address]  [place] [address]  Fax: [phone]   Authorization for Release/Disclosure of   Protected Health Information   Name: ALEXI TOVAR : 1955 SSN: [id number]   Address: [address] [place] [address]  [place] [address] Phone:    [phone] (home)    I authorize the entity listed below to release/disclose the PHI below to:   [facility]/MAYRA PatelP.R.PANDA and ONELIA Patel   Provider or Entity Name:     Address   City, State, Zip   Phone:      Fax:     Reason for request: continuity of care   Information to be released:    [  ] LAST COLONOSCOPY,  including any PATH REPORT and follow-up  [  ] LAST FIT/COLOGUARD RESULT [  ] LAST DEXA  [  ] LAST MAMMOGRAM  [  ] LAST PAP  [  ] LAST LABS [X  ] RETINA EXAM REPORT  [  ] IMMUNIZATION RECORDS  [  ] Release all info      [  ] Check here and initial the line next to each item to release ALL health information INCLUDING  _____ Care and treatment for drug and / or alcohol abuse  _____ HIV testing, infection status, or AIDS  _____ Genetic Testing    DATES OF SERVICE OR TIME PERIOD TO BE DISCLOSED: _____________  I understand and acknowledge that:  * This Authorization may be revoked at any time by you in writing, except if your health information has already been used or disclosed.  * Your health information that will be used or disclosed as a result of you signing this authorization could be re-disclosed by the recipient. If this occurs, your re-disclosed health information may no longer be protected by State or Federal laws.  * You may refuse to sign this Authorization. Your refusal will not affect your ability to obtain treatment.  * This Authorization becomes effective upon signing and will  on (date) __________.      If no date is indicated, this Authorization will  one (1) year from the signature date.    Name: Alexi Tovar    Signature:   Date:      11/1/2022       PLEASE FAX REQUESTED RECORDS BACK TO: [phone]

## 2022-11-11 ENCOUNTER — PATIENT MESSAGE (OUTPATIENT)
Dept: HEALTH INFORMATION MANAGEMENT | Facility: OTHER | Age: 67
End: 2022-11-11

## 2022-11-28 ENCOUNTER — RX ONLY (OUTPATIENT)
Age: 67
Setting detail: RX ONLY
End: 2022-11-28

## 2022-11-28 ENCOUNTER — APPOINTMENT (RX ONLY)
Dept: URBAN - METROPOLITAN AREA CLINIC 35 | Facility: CLINIC | Age: 67
Setting detail: DERMATOLOGY
End: 2022-11-28

## 2022-11-28 DIAGNOSIS — L82.1 OTHER SEBORRHEIC KERATOSIS: ICD-10-CM

## 2022-11-28 DIAGNOSIS — D485 NEOPLASM OF UNCERTAIN BEHAVIOR OF SKIN: ICD-10-CM | Status: UNCHANGED

## 2022-11-28 DIAGNOSIS — D22 MELANOCYTIC NEVI: ICD-10-CM | Status: UNCHANGED

## 2022-11-28 PROBLEM — D48.5 NEOPLASM OF UNCERTAIN BEHAVIOR OF SKIN: Status: ACTIVE | Noted: 2022-11-28

## 2022-11-28 PROBLEM — D22.71 MELANOCYTIC NEVI OF RIGHT LOWER LIMB, INCLUDING HIP: Status: ACTIVE | Noted: 2022-11-28

## 2022-11-28 PROCEDURE — ? ADDITIONAL NOTES

## 2022-11-28 PROCEDURE — 99202 OFFICE O/P NEW SF 15 MIN: CPT

## 2022-11-28 PROCEDURE — ? COUNSELING

## 2022-11-28 PROCEDURE — ? DEFER

## 2022-11-28 PROCEDURE — ? OBSERVATION AND MEASURE

## 2022-11-28 ASSESSMENT — LOCATION DETAILED DESCRIPTION DERM
LOCATION DETAILED: RIGHT DISTAL DORSAL FOREARM
LOCATION DETAILED: LEFT LATERAL SCLERA
LOCATION DETAILED: LEFT PLANTAR FOREFOOT OVERLYING 2ND METATARSAL
LOCATION DETAILED: RIGHT LATERAL PLANTAR 3RD TOE

## 2022-11-28 ASSESSMENT — LOCATION ZONE DERM
LOCATION ZONE: TOE
LOCATION ZONE: FEET
LOCATION ZONE: CONJUNCTIVA
LOCATION ZONE: ARM

## 2022-11-28 ASSESSMENT — LOCATION SIMPLE DESCRIPTION DERM
LOCATION SIMPLE: LEFT PLANTAR SURFACE
LOCATION SIMPLE: RIGHT FOREARM
LOCATION SIMPLE: PLANTAR SURFACE OF RIGHT 3RD TOE
LOCATION SIMPLE: LEFT EYE

## 2022-11-28 NOTE — PROCEDURE: DEFER
X Size Of Lesion In Cm (Optional): 0.3
Detail Level: Simple
Introduction Text (Please End With A Colon): The following procedure was deferred:
Size Of Lesion In Cm (Optional): 0

## 2022-11-28 NOTE — PROCEDURE: ADDITIONAL NOTES
Additional Notes: If SK on eye continues becomes irritated or grows, advised patient to alert us and we will send referral for removal.
Detail Level: Simple
Render Risk Assessment In Note?: no
Additional Notes: Patient states she has had this nevus for 10 years.
Additional Notes: Patient scheduled for punch biopsy in January 2023. Patient is going out of town to help with daughter’s surgery.  This has been present for ~ 3 years and slowly enlarging.

## 2022-12-01 ENCOUNTER — APPOINTMENT (RX ONLY)
Dept: URBAN - METROPOLITAN AREA CLINIC 35 | Facility: CLINIC | Age: 67
Setting detail: DERMATOLOGY
End: 2022-12-01

## 2022-12-01 DIAGNOSIS — D485 NEOPLASM OF UNCERTAIN BEHAVIOR OF SKIN: ICD-10-CM

## 2022-12-01 PROBLEM — D48.5 NEOPLASM OF UNCERTAIN BEHAVIOR OF SKIN: Status: ACTIVE | Noted: 2022-12-01

## 2022-12-01 PROCEDURE — 11104 PUNCH BX SKIN SINGLE LESION: CPT

## 2022-12-01 PROCEDURE — ? BIOPSY BY PUNCH METHOD

## 2022-12-01 ASSESSMENT — LOCATION ZONE DERM: LOCATION ZONE: FEET

## 2022-12-01 ASSESSMENT — LOCATION DETAILED DESCRIPTION DERM: LOCATION DETAILED: LEFT PLANTAR FOREFOOT OVERLYING 2ND METATARSAL

## 2022-12-01 ASSESSMENT — LOCATION SIMPLE DESCRIPTION DERM: LOCATION SIMPLE: LEFT PLANTAR SURFACE

## 2022-12-01 NOTE — PROCEDURE: BIOPSY BY PUNCH METHOD
Detail Level: Detailed
Was A Bandage Applied: Yes
Punch Size In Mm: 4
Biopsy Type: H and E
Anesthesia Type: 0.5% lidocaine without epinephrine
Anesthesia Volume In Cc: 0.6
Additional Anesthesia Volume In Cc (Will Not Render If 0): 0
Hemostasis: None
Epidermal Sutures: 4-0 Nylon
Wound Care: Petrolatum
Dressing: bandage
Suture Removal: 14 days
Patient Will Remove Sutures At Home?: No
Size Of Lesion In Cm (Optional): 0.3
Lab: [id number]
Lab Facility: 
Consent: Written consent was obtained and risks were reviewed including but not limited to scarring, infection, bleeding, scabbing, incomplete removal, nerve damage and allergy to anesthesia.
Post-Care Instructions: I reviewed with the patient in detail post-care instructions. Patient is to keep the biopsy site dry overnight, and then change the bandage and apply petrolatum once daily until sutures are removed.  Use a clean q-tip to apply the petrolatum and avoid touching the biopsy site with your hands.  Avoid immersing in water such as bathtub or swimming pools. Any concerns about a wound infection come into the office for a walk in visit Monday through Friday 8:30 am to 12 pm or 1 pm to 4:30 pm Signs of infection include increasing pain, redness, and drainage from biopsy site.  If we are not in the office, please call through the answering service.
Home Suture Removal Text: Patient will remove their sutures at home.  If they have any questions or difficulties they will call the office.
Notification Instructions: Patient will be notified of biopsy results. However, patient instructed to call the office if not contacted within 2 weeks.
Billing Type: Third-Party Bill
Information: Selecting Yes will display possible errors in your note based on the variables you have selected. This validation is only offered as a suggestion for you. PLEASE NOTE THAT THE VALIDATION TEXT WILL BE REMOVED WHEN YOU FINALIZE YOUR NOTE. IF YOU WANT TO FAX A PRELIMINARY NOTE YOU WILL NEED TO TOGGLE THIS TO 'NO' IF YOU DO NOT WANT IT IN YOUR FAXED NOTE.

## 2022-12-13 ENCOUNTER — APPOINTMENT (RX ONLY)
Dept: URBAN - METROPOLITAN AREA CLINIC 35 | Facility: CLINIC | Age: 67
Setting detail: DERMATOLOGY
End: 2022-12-13

## 2022-12-13 DIAGNOSIS — Z48.02 ENCOUNTER FOR REMOVAL OF SUTURES: ICD-10-CM

## 2022-12-13 PROCEDURE — ? SUTURE REMOVAL (GLOBAL PERIOD)

## 2022-12-13 ASSESSMENT — LOCATION SIMPLE DESCRIPTION DERM: LOCATION SIMPLE: LEFT PLANTAR SURFACE

## 2022-12-13 ASSESSMENT — LOCATION DETAILED DESCRIPTION DERM: LOCATION DETAILED: LEFT PLANTAR FOREFOOT OVERLYING 2ND METATARSAL

## 2022-12-13 ASSESSMENT — LOCATION ZONE DERM: LOCATION ZONE: FEET

## 2022-12-13 NOTE — PROCEDURE: SUTURE REMOVAL (GLOBAL PERIOD)
Detail Level: Detailed
Add 25669 Cpt? (Important Note: In 2017 The Use Of 97041 Is Being Tracked By Cms To Determine Future Global Period Reimbursement For Global Periods): no

## 2022-12-15 ENCOUNTER — PATIENT MESSAGE (OUTPATIENT)
Dept: MEDICAL GROUP | Facility: MEDICAL CENTER | Age: 67
End: 2022-12-15
Payer: MEDICARE

## 2022-12-15 DIAGNOSIS — E03.9 ACQUIRED HYPOTHYROIDISM: ICD-10-CM

## 2022-12-15 RX ORDER — LEVOTHYROXINE SODIUM 0.07 MG/1
75 TABLET ORAL
Qty: 90 TABLET | Refills: 3 | Status: SHIPPED | OUTPATIENT
Start: 2022-12-15 | End: 2023-12-04

## 2022-12-15 RX ORDER — TRAZODONE HYDROCHLORIDE 100 MG/1
100 TABLET ORAL NIGHTLY PRN
Qty: 90 TABLET | Refills: 3 | Status: SHIPPED | OUTPATIENT
Start: 2022-12-15 | End: 2024-03-07

## 2022-12-15 NOTE — PATIENT COMMUNICATION
Received request via: Patient    Was the patient seen in the last year in this department? Yes    Does the patient have an active prescription (recently filled or refills available) for medication(s) requested? No    Does the patient have [facility] Plus and need 100 day supply (blood pressure, diabetes and cholesterol meds only)? Patient does not have SCP

## 2023-03-04 SDOH — HEALTH STABILITY: PHYSICAL HEALTH
ON AVERAGE, HOW MANY DAYS PER WEEK DO YOU ENGAGE IN MODERATE TO STRENUOUS EXERCISE (LIKE A BRISK WALK)?: PATIENT DECLINED

## 2023-03-04 SDOH — HEALTH STABILITY: PHYSICAL HEALTH: ON AVERAGE, HOW MANY MINUTES DO YOU ENGAGE IN EXERCISE AT THIS LEVEL?: 30 MIN

## 2023-03-04 SDOH — ECONOMIC STABILITY: FOOD INSECURITY: WITHIN THE PAST 12 MONTHS, YOU WORRIED THAT YOUR FOOD WOULD RUN OUT BEFORE YOU GOT MONEY TO BUY MORE.: NEVER TRUE

## 2023-03-04 SDOH — ECONOMIC STABILITY: FOOD INSECURITY: WITHIN THE PAST 12 MONTHS, THE FOOD YOU BOUGHT JUST DIDN'T LAST AND YOU DIDN'T HAVE MONEY TO GET MORE.: NEVER TRUE

## 2023-03-04 SDOH — ECONOMIC STABILITY: INCOME INSECURITY: IN THE LAST 12 MONTHS, WAS THERE A TIME WHEN YOU WERE NOT ABLE TO PAY THE MORTGAGE OR RENT ON TIME?: NO

## 2023-03-04 SDOH — ECONOMIC STABILITY: HOUSING INSECURITY: IN THE LAST 12 MONTHS, HOW MANY PLACES HAVE YOU LIVED?: 1

## 2023-03-04 SDOH — ECONOMIC STABILITY: INCOME INSECURITY: HOW HARD IS IT FOR YOU TO PAY FOR THE VERY BASICS LIKE FOOD, HOUSING, MEDICAL CARE, AND HEATING?: NOT HARD AT ALL

## 2023-03-04 ASSESSMENT — SOCIAL DETERMINANTS OF HEALTH (SDOH)
HOW OFTEN DO YOU GET TOGETHER WITH FRIENDS OR RELATIVES?: ONCE A WEEK
HOW OFTEN DO YOU GET TOGETHER WITH FRIENDS OR RELATIVES?: ONCE A WEEK
HOW OFTEN DO YOU HAVE SIX OR MORE DRINKS ON ONE OCCASION: NEVER
WITHIN THE PAST 12 MONTHS, YOU WORRIED THAT YOUR FOOD WOULD RUN OUT BEFORE YOU GOT THE MONEY TO BUY MORE: NEVER TRUE
HOW OFTEN DO YOU ATTEND CHURCH OR RELIGIOUS SERVICES?: MORE THAN 4 TIMES PER YEAR
DO YOU BELONG TO ANY CLUBS OR ORGANIZATIONS SUCH AS CHURCH GROUPS UNIONS, FRATERNAL OR ATHLETIC GROUPS, OR SCHOOL GROUPS?: YES
HOW OFTEN DO YOU ATTEND CHURCH OR RELIGIOUS SERVICES?: MORE THAN 4 TIMES PER YEAR
HOW HARD IS IT FOR YOU TO PAY FOR THE VERY BASICS LIKE FOOD, HOUSING, MEDICAL CARE, AND HEATING?: NOT HARD AT ALL
HOW OFTEN DO YOU ATTENT MEETINGS OF THE CLUB OR ORGANIZATION YOU BELONG TO?: 1 TO 4 TIMES PER YEAR
IN A TYPICAL WEEK, HOW MANY TIMES DO YOU TALK ON THE PHONE WITH FAMILY, FRIENDS, OR NEIGHBORS?: MORE THAN THREE TIMES A WEEK
HOW MANY DRINKS CONTAINING ALCOHOL DO YOU HAVE ON A TYPICAL DAY WHEN YOU ARE DRINKING: 1 OR 2
IN A TYPICAL WEEK, HOW MANY TIMES DO YOU TALK ON THE PHONE WITH FAMILY, FRIENDS, OR NEIGHBORS?: MORE THAN THREE TIMES A WEEK
DO YOU BELONG TO ANY CLUBS OR ORGANIZATIONS SUCH AS CHURCH GROUPS UNIONS, FRATERNAL OR ATHLETIC GROUPS, OR SCHOOL GROUPS?: YES
HOW OFTEN DO YOU ATTENT MEETINGS OF THE CLUB OR ORGANIZATION YOU BELONG TO?: 1 TO 4 TIMES PER YEAR

## 2023-03-06 DIAGNOSIS — E11.9 TYPE 2 DIABETES MELLITUS WITHOUT RETINOPATHY (HCC): ICD-10-CM

## 2023-03-06 RX ORDER — DULAGLUTIDE 1.5 MG/.5ML
0.5 INJECTION, SOLUTION SUBCUTANEOUS
Qty: 2 ML | Refills: 5 | Status: SHIPPED | OUTPATIENT
Start: 2023-03-06 | End: 2023-03-07 | Stop reason: SDUPTHER

## 2023-03-07 ENCOUNTER — OFFICE VISIT (OUTPATIENT)
Dept: MEDICAL GROUP | Facility: MEDICAL CENTER | Age: 68
End: 2023-03-07
Payer: MEDICARE

## 2023-03-07 ENCOUNTER — HOSPITAL ENCOUNTER (OUTPATIENT)
Dept: LAB | Facility: MEDICAL CENTER | Age: 68
End: 2023-03-07
Attending: FAMILY MEDICINE
Payer: MEDICARE

## 2023-03-07 VITALS
HEART RATE: 72 BPM | DIASTOLIC BLOOD PRESSURE: 78 MMHG | HEIGHT: 61 IN | OXYGEN SATURATION: 98 % | SYSTOLIC BLOOD PRESSURE: 124 MMHG | WEIGHT: 139 LBS | TEMPERATURE: 97.5 F | BODY MASS INDEX: 26.24 KG/M2

## 2023-03-07 DIAGNOSIS — D72.819 LEUKOPENIA, UNSPECIFIED TYPE: ICD-10-CM

## 2023-03-07 DIAGNOSIS — E03.9 HYPOTHYROIDISM, UNSPECIFIED TYPE: ICD-10-CM

## 2023-03-07 DIAGNOSIS — E11.9 TYPE 2 DIABETES MELLITUS WITHOUT RETINOPATHY (HCC): ICD-10-CM

## 2023-03-07 DIAGNOSIS — Z13.820 ENCOUNTER FOR OSTEOPOROSIS SCREENING IN ASYMPTOMATIC POSTMENOPAUSAL PATIENT: ICD-10-CM

## 2023-03-07 DIAGNOSIS — Z78.0 ENCOUNTER FOR OSTEOPOROSIS SCREENING IN ASYMPTOMATIC POSTMENOPAUSAL PATIENT: ICD-10-CM

## 2023-03-07 DIAGNOSIS — Z00.00 ENCOUNTER FOR ANNUAL WELLNESS EXAM IN MEDICARE PATIENT: Primary | ICD-10-CM

## 2023-03-07 PROCEDURE — G0438 PPPS, INITIAL VISIT: HCPCS | Performed by: FAMILY MEDICINE

## 2023-03-07 RX ORDER — DULAGLUTIDE 1.5 MG/.5ML
0.5 INJECTION, SOLUTION SUBCUTANEOUS
Qty: 8 ML | Refills: 3 | Status: SHIPPED | OUTPATIENT
Start: 2023-03-07 | End: 2024-03-04

## 2023-03-07 ASSESSMENT — ENCOUNTER SYMPTOMS: GENERAL WELL-BEING: EXCELLENT

## 2023-03-07 ASSESSMENT — PATIENT HEALTH QUESTIONNAIRE - PHQ9: CLINICAL INTERPRETATION OF PHQ2 SCORE: 0

## 2023-03-07 ASSESSMENT — ACTIVITIES OF DAILY LIVING (ADL): BATHING_REQUIRES_ASSISTANCE: 0

## 2023-03-07 ASSESSMENT — FIBROSIS 4 INDEX: FIB4 SCORE: 1.37

## 2023-03-07 NOTE — LETTER
[facility]  GAUTAM [email or web address].  [address]  [place] [address]  Fax: [phone]   Authorization for Release/Disclosure of   Protected Health Information   Name: ALEXI TOVAR : 1955 SSN: [id number]   Address: [address] [place] [address]  [place] [address] Phone:    [phone] (home)    I authorize the entity listed below to release/disclose the PHI below to:   [facility]/MAYRA PatelP.RJAMES and ONELIA Patel   Provider or Entity Name:  [facility]   Address   City, State, [facility]   Phone:      Fax:     Reason for request: continuity of care   Information to be released:    [  ] LAST COLONOSCOPY,  including any PATH REPORT and follow-up  [  ] LAST FIT/COLOGUARD RESULT [  ] LAST DEXA  [  ] LAST MAMMOGRAM  [  ] LAST PAP  [  ] LAST LABS [X] RETINA EXAM REPORT  [  ] IMMUNIZATION RECORDS  [  ] Release all info      [  ] Check here and initial the line next to each item to release ALL health information INCLUDING  _____ Care and treatment for drug and / or alcohol abuse  _____ HIV testing, infection status, or AIDS  _____ Genetic Testing    DATES OF SERVICE OR TIME PERIOD TO BE DISCLOSED: _____________  I understand and acknowledge that:  * This Authorization may be revoked at any time by you in writing, except if your health information has already been used or disclosed.  * Your health information that will be used or disclosed as a result of you signing this authorization could be re-disclosed by the recipient. If this occurs, your re-disclosed health information may no longer be protected by State or Federal laws.  * You may refuse to sign this Authorization. Your refusal will not affect your ability to obtain treatment.  * This Authorization becomes effective upon signing and will  on (date) __________.      If no date is indicated, this Authorization will  one (1) year from the signature date.    Name: Alexi Tovar  Continuity of Care   Date:   3/7/2023     PLEASE FAX REQUESTED RECORDS BACK TO: [phone]

## 2023-03-07 NOTE — ASSESSMENT & PLAN NOTE
Services suggested: No services needed at this time  Health Care Screening: Age-appropriate preventive services recommended by USPTF and ACIP covered by Medicare were discussed today. Services ordered if indicated and agreed upon by the patient.  Referrals offered: Community-based lifestyle interventions to reduce health risks and promote self-management and wellness, fall prevention, nutrition, physical activity, tobacco-use cessation, weight loss, and mental health services as per orders if indicated.    Discussion today about general wellness and lifestyle habits:    · Prevent falls and reduce trip hazards; Cautioned about securing or removing rugs.  · Have a working fire alarm and carbon monoxide detector;   · Engage in regular physical activity and social activities

## 2023-03-07 NOTE — PROGRESS NOTES
Subjective:     CC:   Chief Complaint   Patient presents with    Annual Exam     Pt chose not to complete HRA Screening.        HPI:   Che Tovar is a 67 y.o. female who presents for annual exam    Patient has GYN provider: No   Last Pap Smear: Hysterectomy  H/O Abnormal Pap: No  Last Mammogram: Due  Last Bone Density Test: Due  Last Colorectal Cancer Screenin2022, due in 10 years  Cholesterol Screening: 10/2022 LDL 81, HDL 78   Diabetes Screening: 10/2022,A1C 5.9  Hep C screenin2020 negative    Exercise: Working part time, walk a lot 5 days a week  Diet: little bites frequently      No LMP recorded. Patient has had a hysterectomy.  She has not utilized hormone replacement therapy.  Denies any menopausal symptoms.  No significant bloating/fluid retention, pelvic pain, or dyspareunia. No abnormal vaginal discharge.   No breast tenderness, mass, nipple discharge or changes in size or contour.    Advanced directive: Does, but not in the chart   Substance Abuse: no concerns  Seat belts, bike helmet, gun safety discussed.  Sun protection used.  Routine Dental: Daily brushing and dentist 2 times a year    Immunizations  Influenza: 2022   HPV series: Aged out   Tetanus: 2021    Shingles: Up to date   COVID: Due for booster  Pneumococcal : Up to date    Other immunizations: None     Depression Screening  Little interest or pleasure in doing things?  0 - not at all  Feeling down, depressed , or hopeless? 0 - not at all  Patient Health Questionnaire Score: 0     If depressive symptoms identified deferred to follow up visit unless specifically addressed in assessment and plan.    Interpretation of PHQ-9 Total Score   Score Severity   1-4 No Depression   5-9 Mild Depression   10-14 Moderate Depression   15-19 Moderately Severe Depression   20-27 Severe Depression    Screening for Cognitive Impairment ( Patient didn't want to complete HRA screening)  Three Minute Recall (daughter, heaven, mountain)   /3 Patient refused to completed full HRA assessment  Bhupinder clock face with all 12 numbers and set the hands to show 10 past 11.       Cognitive concerns identified deferred for follow up unless specifically addressed in assessment and plan.    Fall Risk Assessment  Has the patient had two or more falls in the last year or any fall with injury in the last year?  No    Safety Assessment  Throw rugs on floor.  Yes  Handrails on all stairs.  No  Good lighting in all hallways.  Yes  Difficulty hearing.  Yes  Patient counseled about all safety risks that were identified.    Functional Assessment ADLs  Are there any barriers preventing you from cooking for yourself or meeting nutritional needs?  No.    Are there any barriers preventing you from driving safely or obtaining transportation?  No.    Are there any barriers preventing you from using a telephone or calling for help?  No.    Are there any barriers preventing you from shopping?  No.    Are there any barriers preventing you from taking care of your own finances?  No.    Are there any barriers preventing you from managing your medications?  No.    Are there any barriers preventing you from showering, bathing or dressing yourself?  No.    Are you currently engaging in any exercise or physical activity?  Yes.     What is your perception of your health?  Excellent    Advance Care Planning  Do you have an Advance Directive, Living Will, Durable Power of , or POLST? Yes  Advance Directive Living Will     is not on file - instructed patient to bring in a copy to scan into their chart      Health Maintenance Summary            Overdue - COVID-19 Vaccine (5 - Booster for Pfizer series) Overdue since 9/8/2022 07/14/2022  Imm Admin: PFIZER DOTSON CAP SARS-COV-2 VACCINATION (12+)    10/23/2021  Imm Admin: PFIZER PURPLE CAP SARS-COV-2 VACCINATION (12+)    04/21/2021  Imm Admin: PFIZER PURPLE CAP SARS-COV-2 VACCINATION (12+)    03/24/2021  Imm Admin: PFIZER PURPLE CAP  SARS-COV-2 VACCINATION (12+)              Postponed - RETINAL SCREENING (Yearly) Postponed until 4/7/2023      10/06/2021  Done    09/28/2017  REFERRAL FOR RETINAL SCREENING EXAM    06/27/2016 06/09/2015 03/11/2013      Only the first 5 history entries have been loaded, but more history exists.              Ordered - A1C SCREENING (Every 6 Months) Ordered on 3/7/2023      10/31/2022  HEMOGLOBIN A1C    07/21/2022  HEMOGLOBIN A1C    08/13/2021  HEMOGLOBIN A1C    02/01/2021  HEMOGLOBIN A1C    06/10/2020  HEMOGLOBIN A1C    Only the first 5 history entries have been loaded, but more history exists.              URINE ACR / MICROALBUMIN (Yearly) Next due on 7/21/2023 07/21/2022  Microalbumin Creat Ratio Urine (Lab Collect)    03/25/2019  MICROALBUMIN CREAT RATIO URINE    08/23/2017  MICROALBUMIN CREAT RATIO URINE              Ordered - SERUM CREATININE (Yearly) Ordered on 3/7/2023      07/21/2022  Comp Metabolic Panel    08/13/2021  Comp Metabolic Panel    06/23/2021  Basic Metabolic Panel (BMP)    06/22/2021  Basic Metabolic Panel (BMP)    06/07/2021  Basic Metabolic Panel    Only the first 5 history entries have been loaded, but more history exists.              DIABETES MONOFILAMENT / LE EXAM (Yearly) Next due on 7/26/2023 07/26/2022  Diabetic Monofilament LE Exam    07/26/2022  Smart[employer]: WORKFLOW - DIABETES - DIABETIC FOOT EXAM PERFORMED              Ordered - BONE DENSITY (Every 5 Years) Ordered on 3/7/2023      09/24/2018  DS-BONE DENSITY STUDY (DEXA)              Ordered - MAMMOGRAM (Every 2 Years) Ordered on 9/22/2022      10/12/2021  MA-SCREENING MAMMO BILAT W/TOMOSYNTHESIS W/CAD    10/07/2020  MA-SCREENING MAMMO BILAT W/TOMOSYNTHESIS W/CAD    09/24/2018  MA-SCREEN MAMMO W/CAD-BILAT    09/23/2017  MA-SCREEN MAMMO W/CAD-BILAT    07/06/2016  YE-APAMPGLYM-LDHLBGJZD    Only the first 5 history entries have been loaded, but more history exists.              Ordered - FASTING LIPID PROFILE (Yearly)  Ordered on 3/7/2023      10/31/2022  Lipid Profile    07/21/2022  Lipid Profile    08/13/2021  Lipid Profile    02/22/2020  Lipid Profile    07/16/2019  Lipid Profile    Only the first 5 history entries have been loaded, but more history exists.              Annual Wellness Visit (Every 366 Days) Next due on 3/7/2024      03/07/2023  Subsequent Annual Wellness Visit - Includes PPPS ()    04/17/2019  Visit Dx: Medicare annual wellness visit, initial              IMM DTaP/Tdap/Td Vaccine (3 - Td or Tdap) Next due on 3/3/2031      03/03/2021  Imm Admin: Tdap Vaccine    07/09/2010  Imm Admin: Tdap Vaccine              COLORECTAL CANCER SCREENING (COLONOSCOPY - Every 10 Years) Tentatively due on 9/9/2032 09/09/2022  REFERRAL TO GI FOR COLONOSCOPY    01/07/2022  COLOGUARD COLON CANCER SCREENING    01/07/2022  COLOGUARD COLON CANCER SCREENING    10/10/2018  COLOGUARD (FIT DNA)    08/26/2017  OCCULT BLOOD FECES IMMUNOASSAY              HEPATITIS C SCREENING  Completed      02/22/2020  Hep C Virus Antibody              IMM HEP B VACCINE (Series Information) Completed      03/03/2021  Imm Admin: Hepatitis B Vaccine (Adol/Adult)    08/22/2019  Imm Admin: Hepatitis B Vaccine (Adol/Adult)    09/14/2018  Imm Admin: Hepatitis B Vaccine (Adol/Adult)              IMM ZOSTER VACCINES (Series Information) Completed      01/28/2022  Imm Admin: Zoster Vaccine Recombinant (RZV) (SHINGRIX)    03/03/2021  Imm Admin: Zoster Vaccine Recombinant (RZV) (SHINGRIX)    11/11/2014  Imm Admin: Zoster Vaccine Live (ZVL) (Zostavax) - HISTORICAL DATA              IMM PNEUMOCOCCAL VACCINE: 65+ Years (Series Information) Completed      09/02/2022  Imm Admin: Pneumococcal Conjugate Vaccine (PCV20)    08/22/2019  Imm Admin: Pneumococcal Conjugate Vaccine (Prevnar/PCV-13)    11/15/2006  Imm Admin: Pneumococcal polysaccharide vaccine (PPSV-23)              IMM INFLUENZA (Series Information) Completed      11/01/2022  Imm Admin: Influenza Vaccine  Adult HD    10/07/2021  Imm Admin: Influenza Vaccine Adult HD    10/01/2020  Imm Admin: Influenza Vaccine Adult HD    10/03/2019  Imm Admin: Influenza Vaccine Adult HD    2018  Imm Admin: Influenza Vaccine Quad Inj (Pf)    Only the first 5 history entries have been loaded, but more history exists.              IMM MENINGOCOCCAL ACWY VACCINE (Series Information) Aged Out      No completion history exists for this topic.                    Patient Care Team:  ONELIA Patel as PCP - General (Family Medicine)  Mc Zarate, PT, DPT as Physical Therapist (Physical Therapy)  Mc Zarate, PT, DPT as Physical Therapist (Physical Therapy)      Problem   Encounter for Annual Wellness Exam in Medicare Patient        OB History    Para Term  AB Living   3 3 3 0 0 0   SAB IAB Ectopic Molar Multiple Live Births   0 0 0 0 0 0      She  reports being sexually active and has had partner(s) who are male.    She  has a past medical history of Allergy, Anesthesia, Anxiety, Diabetes (HCC), High cholesterol, History of gastric bypass (2012), History of gout (2017), total hysterectomy (1/15/2015), total hysterectomy (1/15/2015), Pain, PONV (postoperative nausea and vomiting), Psychiatric problem, and Thyroid disease.  She  has a past surgical history that includes abdominal exploration; eye surgery; hernia repair; gastric bypass laparoscopic; and lumbar fusion o-arm (2021).    Family History   Problem Relation Age of Onset    Arthritis Mother     Diabetes Mother     Hypertension Mother     Hyperlipidemia Mother     Stroke Mother     Arthritis Father     Hyperlipidemia Father     Hypertension Father     Diabetes Father     Arthritis Sister     Cancer Sister     Hypertension Sister     Hyperlipidemia Sister     Alcohol/Drug Sister     Breast Cancer Sister     Diabetes Sister     Hyperlipidemia Brother     Arthritis Brother     Hypertension Brother     Alcohol/Drug Brother     Diabetes  Brother     Arthritis Sister     Cancer Sister     Hypertension Sister     Hyperlipidemia Sister     Breast Cancer Sister     Diabetes Sister     Arthritis Sister     Cancer Sister     Hyperlipidemia Sister     Breast Cancer Sister     Diabetes Sister     Arthritis Sister     Cancer Sister     Hyperlipidemia Sister     Breast Cancer Sister     Diabetes Sister     Arthritis Sister     Hyperlipidemia Sister     Diabetes Sister     Hyperlipidemia Sister     Diabetes Sister     Hyperlipidemia Sister     Diabetes Sister     Hyperlipidemia Brother     Arthritis Brother     Diabetes Brother     Hyperlipidemia Brother     Diabetes Brother     Hyperlipidemia Sister     Diabetes Sister      Social History     Tobacco Use    Smoking status: Former     Years: 5.00     Types: Cigarettes    Smokeless tobacco: Never    Tobacco comments:     light smoker, quit 40 years ago   Vaping Use    Vaping Use: Never used   Substance Use Topics    Alcohol use: Yes     Comment: rare    Drug use: No       Patient Active Problem List    Diagnosis Date Noted    Encounter for annual wellness exam in Medicare patient 03/07/2023    Shoulder pain, right 11/01/2022    Skin lesion 07/26/2022    Left elbow pain 07/26/2022    Leukopenia 07/26/2022    Vaginal lesion 08/11/2021    S/P lumbar spinal fusion 08/11/2021    ELIZABETH (generalized anxiety disorder) 07/16/2019    Chronic rhinitis 01/09/2018    Family history of breast cancer 08/17/2017    Primary insomnia 08/17/2017    Type 2 diabetes mellitus without retinopathy (HCC) 06/10/2015    Hyperlipidemia due to type 2 diabetes mellitus (HCC) 10/17/2014    Hypothyroidism 02/09/2007     Current Outpatient Medications   Medication Sig Dispense Refill    Dulaglutide (TRULICITY) 1.5 MG/0.5ML Solution Pen-injector Inject 0.5 mL under the skin every 7 days. 8 mL 3    metFORMIN ER (GLUCOPHAGE XR) 500 MG TABLET SR 24 HR TAKE ONE TABLET BY MOUTH THREE TIMES A DAY WITH MEALS FOR 90 DAYS 270 Tablet 3    levothyroxine  (SYNTHROID) 75 MCG Tab Take 1 Tablet by mouth every morning on an empty stomach. 90 Tablet 3    traZODone (DESYREL) 100 MG Tab Take 1 Tablet by mouth at bedtime as needed for Sleep. 90 Tablet 3    rosuvastatin (CRESTOR) 5 MG Tab TAKE ONE TABLET BY MOUTH EVERY EVENING 90 Tablet 3    celecoxib (CELEBREX) 200 MG Cap TAKE ONE CAPSULE BY MOUTH TWICE A DAY 60 Capsule 1    fluticasone (FLONASE) 50 MCG/ACT nasal spray Administer 1 Spray into affected nostril(S) every day. 16 g 0    gabapentin (NEURONTIN) 300 MG Cap Take 1 Capsule by mouth 3 times a day as needed (pain). 90 Capsule 5    diclofenac sodium (VOLTAREN) 1 % Gel Apply 2 g topically 4 times a day as needed (apply to affected area as needed). 150 g 4    tizanidine (ZANAFLEX) 2 MG tablet Take 1 Tablet by mouth 3 times a day as needed. 60 Tablet 1    CALCIUM PO Take 2 Tablets by mouth in the morning, at noon, and at bedtime.      Prenatal Vit-Fe Fumarate-FA (PRENATAL PO) Take 1 tablet by mouth in the morning, at noon, and at bedtime.      Cholecalciferol (D3 PO) Take 2 Capsules by mouth every day.      Thiamine HCl (B-1 PO) Take 1 tablet by mouth every day.      Ascorbic Acid (VITAMIN C) 1000 MG Tab Take 1,000 mg by mouth every day.      Cyanocobalamin (VITAMIN B-12 PO) Take 1 tablet by mouth every day.      ferrous sulfate 325 (65 Fe) MG tablet Take 325 mg by mouth every 7 days. On Friday      COENZYME Q10 PO Take 1 capsule by mouth every day.      glucose blood (ONE TOUCH ULTRA TEST) strip 1 Strip by Other route 3 times a day as needed. 100 Strip 2    Blood Glucose Monitoring Suppl Device Meter: Dispense Device of Insurance Preference. Sig. Use as directed for blood sugar monitoring. #1. NR. 1 Device 0    Lancets Lancets order: Lancets for One Touch Ultra meter. Sig: use once daily 100 Each 3     No current facility-administered medications for this visit.     Allergies   Allergen Reactions    Nsaids Unspecified     Other reaction(s): GI Bleeding  Contraindicated  "after Pierre en Y Gastric Bypass or sleeve gastrectomy  due to  risk ulceration in pouch or sleeve.   Other reaction(s): GI Bleeding  Contraindicated after Pierre en Y Gastric Bypass or sleeve gastrectomy  due to  risk ulceration in pouch or sleeve.   Contraindicated after [place] en Y Gastric Bypass or sleeve gastrectomy  due to  risk ulceration in pouch or sleeve.     Hydrocodone-Acetaminophen      HIVES W/ LORTAB ONLY  HIVES W/ LORTAB ONLY  HIVES W/ LORTAB ONLY  HIVES W/ LORTAB ONLY  HIVES W/ LORTAB ONLY    HIVES W/ LORTAB ONLY  HIVES W/ LORTAB ONLY  HIVES W/ LORTAB ONLY    Lovastatin Unspecified     Other reaction(s): Muscle Ache  Other reaction(s): Muscle Ache, Unspecified  Other reaction(s): Muscle Ache    Polytrim [Polymyxin B-Trimethoprim]      Eye irritation and itching    Lisinopril Rash and Cough    Penicillins Rash     .  .  HIVES       Review of Systems   Constitutional: Negative for fever, chills and malaise/fatigue.   HENT: Negative for congestion.    Eyes: Negative for pain.   Respiratory: Negative for cough and shortness of breath.    Cardiovascular: Negative for chest pain and leg swelling.   Gastrointestinal: Negative for nausea, vomiting, abdominal pain and diarrhea.   Genitourinary: Negative for dysuria and hematuria.   Skin: Negative for rash.   Neurological: Negative for dizziness, focal weakness and headaches.   Endo/Heme/Allergies: Does not bruise/bleed easily.   Psychiatric/Behavioral: Negative for depression.  The patient is not nervous/anxious.      Objective:   /78 (BP Location: Left arm, Patient Position: Sitting, BP Cuff Size: Small adult)   Pulse 72   Temp 36.4 °C (97.5 °F) (Temporal)   Ht 1.56 m (5' 1.42\")   Wt 63 kg (139 lb)   SpO2 98%   BMI 25.91 kg/m²     Wt Readings from Last 4 Encounters:   03/07/23 63 kg (139 lb)   11/01/22 63.7 kg (140 lb 6.4 oz)   09/23/22 60.8 kg (134 lb)   09/02/22 60.9 kg (134 lb 3.2 oz)       Physical Exam  Constitutional:       General: She is not " in acute distress.  HENT:      Head: Normocephalic and atraumatic.      Right Ear: Tympanic membrane and external ear normal.      Left Ear: Tympanic membrane and external ear normal.   Eyes:      General: Lids are normal.      Extraocular Movements: Extraocular movements intact.      Conjunctiva/sclera: Conjunctivae normal.      Pupils: Pupils are equal, round, and reactive to light.   Neck:      Trachea: Trachea normal.   Cardiovascular:      Rate and Rhythm: Normal rate and regular rhythm.      Heart sounds: Normal heart sounds. No murmur heard.    No friction rub. No gallop.   Pulmonary:      Effort: Pulmonary effort is normal. No accessory muscle usage.      Breath sounds: Normal breath sounds. No wheezing or rales.   Abdominal:      General: Bowel sounds are normal.      Palpations: Abdomen is soft.      Tenderness: There is no abdominal tenderness.   Musculoskeletal:      Cervical back: Normal range of motion and neck supple.      Right lower leg: No edema.      Left lower leg: No edema.   Lymphadenopathy:      Cervical: No cervical adenopathy.   Skin:     General: Skin is warm and dry.      Findings: No rash.   Neurological:      General: No focal deficit present.      Mental Status: She is alert and oriented to person, place, and time. Mental status is at baseline.      GCS: GCS eye subscore is 4. GCS verbal subscore is 5. GCS motor subscore is 6.      Motor: No weakness.      Gait: Gait is intact.   Psychiatric:         Attention and Perception: Attention normal.         Mood and Affect: Mood and affect normal.         Speech: Speech normal.       Imaging:  No imaging    Labs:  No updated labs    Assessment and Plan:     [facility]    Diagnosis to address: E11.69, E78.5 - Hyperlipidemia due to type 2 diabetes mellitus (HCC)  Assessment and plan: Chronic, stable. Continue with current defined treatment plan: Rosuvastatin 5 mg every evening.  Annual lipid profiles. Follow-up at least annually.  Diagnosis:  E11.9 - Type 2 diabetes mellitus without retinopathy (HCC)  Assessment and plan: Chronic, stable. Continue with current defined treatment plan: Trulicity  1.5 mg once every 7 days, metformin 500 mg daily, continue to watch your carbohydrate intake.  Monitor sugars at home. Follow-up at least annually.  Last edited 03/07/23 14:46 PST by ONELIA Patel        Problem List Items Addressed This Visit       Type 2 diabetes mellitus without retinopathy (HCC)    Relevant Medications    Dulaglutide (TRULICITY) 1.5 MG/0.5ML Solution Pen-injector    Other Relevant Orders    Comp Metabolic Panel    ESTIMATED GFR    HEMOGLOBIN A1C    Hypothyroidism    Relevant Orders    TSH WITH REFLEX TO FT4    Hyperlipidemia due to type 2 diabetes mellitus (HCC)    Relevant Medications    Dulaglutide (TRULICITY) 1.5 MG/0.5ML Solution Pen-injector    Other Relevant Orders    Lipid Profile    Leukopenia    Relevant Orders    CBC WITH DIFFERENTIAL    Encounter for annual wellness exam in Medicare patient - Primary     Services suggested: No services needed at this time  Health Care Screening: Age-appropriate preventive services recommended by USPTF and ACIP covered by Medicare were discussed today. Services ordered if indicated and agreed upon by the patient.  Referrals offered: Community-based lifestyle interventions to reduce health risks and promote self-management and wellness, fall prevention, nutrition, physical activity, tobacco-use cessation, weight loss, and mental health services as per orders if indicated.    Discussion today about general wellness and lifestyle habits:    Prevent falls and reduce trip hazards; Cautioned about securing or removing rugs.  Have a working fire alarm and carbon monoxide detector;   Engage in regular physical activity and social activities          Relevant Orders    Subsequent Annual Wellness Visit - Includes PPPS () (Completed)     Other Visit Diagnoses       Encounter for osteoporosis  screening in asymptomatic postmenopausal patient        Relevant Orders    DS-BONE DENSITY STUDY (DEXA)             Follow-up: Return in about 6 months (around 9/7/2023), or if symptoms worsen or fail to improve, for Follow-up for labs.     Please note that this dictation was created using voice recognition software. I have made every reasonable attempt to correct obvious errors, but I expect that there are errors of grammar and possibly content that I did not discover before finalizing the note.

## 2023-03-08 ENCOUNTER — HOSPITAL ENCOUNTER (OUTPATIENT)
Dept: LAB | Facility: MEDICAL CENTER | Age: 68
End: 2023-03-08
Attending: FAMILY MEDICINE
Payer: MEDICARE

## 2023-03-08 DIAGNOSIS — D72.819 LEUKOPENIA, UNSPECIFIED TYPE: ICD-10-CM

## 2023-03-08 DIAGNOSIS — E03.9 HYPOTHYROIDISM, UNSPECIFIED TYPE: ICD-10-CM

## 2023-03-08 DIAGNOSIS — E11.9 TYPE 2 DIABETES MELLITUS WITHOUT RETINOPATHY (HCC): ICD-10-CM

## 2023-03-08 LAB
ALBUMIN SERPL BCP-MCNC: 4 G/DL (ref 3.2–4.9)
ALBUMIN/GLOB SERPL: 1.1 G/DL
ALP SERPL-CCNC: 82 U/L (ref 30–99)
ALT SERPL-CCNC: 38 U/L (ref 2–50)
ANION GAP SERPL CALC-SCNC: 11 MMOL/L (ref 7–16)
AST SERPL-CCNC: 31 U/L (ref 12–45)
BASOPHILS # BLD AUTO: 0.8 % (ref 0–1.8)
BASOPHILS # BLD: 0.04 K/UL (ref 0–0.12)
BILIRUB SERPL-MCNC: 0.9 MG/DL (ref 0.1–1.5)
BUN SERPL-MCNC: 13 MG/DL (ref 8–22)
CALCIUM ALBUM COR SERPL-MCNC: 9.3 MG/DL (ref 8.5–10.5)
CALCIUM SERPL-MCNC: 9.3 MG/DL (ref 8.4–10.2)
CHLORIDE SERPL-SCNC: 101 MMOL/L (ref 96–112)
CHOLEST SERPL-MCNC: 199 MG/DL (ref 100–199)
CO2 SERPL-SCNC: 23 MMOL/L (ref 20–33)
CREAT SERPL-MCNC: 0.7 MG/DL (ref 0.5–1.4)
EOSINOPHIL # BLD AUTO: 0.07 K/UL (ref 0–0.51)
EOSINOPHIL NFR BLD: 1.5 % (ref 0–6.9)
ERYTHROCYTE [DISTWIDTH] IN BLOOD BY AUTOMATED COUNT: 44.3 FL (ref 35.9–50)
EST. AVERAGE GLUCOSE BLD GHB EST-MCNC: 140 MG/DL
FASTING STATUS PATIENT QL REPORTED: NORMAL
GFR SERPLBLD CREATININE-BSD FMLA CKD-EPI: 94 ML/MIN/1.73 M 2
GLOBULIN SER CALC-MCNC: 3.6 G/DL (ref 1.9–3.5)
GLUCOSE SERPL-MCNC: 126 MG/DL (ref 65–99)
HBA1C MFR BLD: 6.5 % (ref 4–5.6)
HCT VFR BLD AUTO: 40 % (ref 37–47)
HDLC SERPL-MCNC: 80 MG/DL
HGB BLD-MCNC: 13.4 G/DL (ref 12–16)
IMM GRANULOCYTES # BLD AUTO: 0.01 K/UL (ref 0–0.11)
IMM GRANULOCYTES NFR BLD AUTO: 0.2 % (ref 0–0.9)
LDLC SERPL CALC-MCNC: 101 MG/DL
LYMPHOCYTES # BLD AUTO: 1.51 K/UL (ref 1–4.8)
LYMPHOCYTES NFR BLD: 32.1 % (ref 22–41)
MCH RBC QN AUTO: 30.9 PG (ref 27–33)
MCHC RBC AUTO-ENTMCNC: 33.5 G/DL (ref 33.6–35)
MCV RBC AUTO: 92.2 FL (ref 81.4–97.8)
MONOCYTES # BLD AUTO: 0.44 K/UL (ref 0–0.85)
MONOCYTES NFR BLD AUTO: 9.3 % (ref 0–13.4)
NEUTROPHILS # BLD AUTO: 2.64 K/UL (ref 2–7.15)
NEUTROPHILS NFR BLD: 56.1 % (ref 44–72)
NRBC # BLD AUTO: 0 K/UL
NRBC BLD-RTO: 0 /100 WBC
PLATELET # BLD AUTO: 237 K/UL (ref 164–446)
PMV BLD AUTO: 11.2 FL (ref 9–12.9)
POTASSIUM SERPL-SCNC: 4.3 MMOL/L (ref 3.6–5.5)
PROT SERPL-MCNC: 7.6 G/DL (ref 6–8.2)
RBC # BLD AUTO: 4.34 M/UL (ref 4.2–5.4)
SODIUM SERPL-SCNC: 135 MMOL/L (ref 135–145)
TRIGL SERPL-MCNC: 89 MG/DL (ref 0–149)
TSH SERPL DL<=0.005 MIU/L-ACNC: 4.84 UIU/ML (ref 0.38–5.33)
WBC # BLD AUTO: 4.7 K/UL (ref 4.8–10.8)

## 2023-03-08 PROCEDURE — 36415 COLL VENOUS BLD VENIPUNCTURE: CPT

## 2023-03-08 PROCEDURE — 84443 ASSAY THYROID STIM HORMONE: CPT

## 2023-03-08 PROCEDURE — 80053 COMPREHEN METABOLIC PANEL: CPT

## 2023-03-08 PROCEDURE — 83036 HEMOGLOBIN GLYCOSYLATED A1C: CPT | Mod: GA

## 2023-03-08 PROCEDURE — 80061 LIPID PANEL: CPT

## 2023-03-08 PROCEDURE — 85025 COMPLETE CBC W/AUTO DIFF WBC: CPT

## 2023-05-22 ENCOUNTER — HOSPITAL ENCOUNTER (OUTPATIENT)
Dept: RADIOLOGY | Facility: MEDICAL CENTER | Age: 68
End: 2023-05-22
Attending: FAMILY MEDICINE
Payer: MEDICARE

## 2023-05-22 DIAGNOSIS — Z12.31 VISIT FOR SCREENING MAMMOGRAM: ICD-10-CM

## 2023-05-22 DIAGNOSIS — Z13.820 ENCOUNTER FOR OSTEOPOROSIS SCREENING IN ASYMPTOMATIC POSTMENOPAUSAL PATIENT: ICD-10-CM

## 2023-05-22 DIAGNOSIS — Z78.0 ENCOUNTER FOR OSTEOPOROSIS SCREENING IN ASYMPTOMATIC POSTMENOPAUSAL PATIENT: ICD-10-CM

## 2023-05-22 PROCEDURE — 77063 BREAST TOMOSYNTHESIS BI: CPT

## 2023-05-22 PROCEDURE — 77080 DXA BONE DENSITY AXIAL: CPT

## 2023-06-01 ENCOUNTER — OFFICE VISIT (OUTPATIENT)
Dept: MEDICAL GROUP | Facility: MEDICAL CENTER | Age: 68
End: 2023-06-01
Payer: MEDICARE

## 2023-06-01 VITALS
SYSTOLIC BLOOD PRESSURE: 136 MMHG | OXYGEN SATURATION: 98 % | BODY MASS INDEX: 22.77 KG/M2 | HEIGHT: 65 IN | RESPIRATION RATE: 16 BRPM | WEIGHT: 136.69 LBS | DIASTOLIC BLOOD PRESSURE: 80 MMHG | TEMPERATURE: 97.5 F | HEART RATE: 86 BPM

## 2023-06-01 DIAGNOSIS — L98.9 SKIN LESION: ICD-10-CM

## 2023-06-01 PROCEDURE — 99213 OFFICE O/P EST LOW 20 MIN: CPT | Performed by: FAMILY MEDICINE

## 2023-06-01 PROCEDURE — 3079F DIAST BP 80-89 MM HG: CPT | Performed by: FAMILY MEDICINE

## 2023-06-01 PROCEDURE — 3075F SYST BP GE 130 - 139MM HG: CPT | Performed by: FAMILY MEDICINE

## 2023-06-01 RX ORDER — CEPHALEXIN 500 MG/1
500 CAPSULE ORAL 4 TIMES DAILY
Qty: 20 CAPSULE | Refills: 0 | Status: SHIPPED | OUTPATIENT
Start: 2023-06-01 | End: 2023-06-06

## 2023-06-01 ASSESSMENT — FIBROSIS 4 INDEX: FIB4 SCORE: 1.46

## 2023-06-01 NOTE — PROGRESS NOTES
Che Tovar is a pleasant 68 y.o. female here for   Chief Complaint   Patient presents with    Lump     buttocks      HPI:   Problem   Skin Lesion    X 1 week, tender lump to right buttocks/groin.  Tender to touch, firm and hot.   Denies any drainage, unsure if it is red as she cannot visualize.            Current Medicines (including changes today)  Current Outpatient Medications   Medication Sig Dispense Refill    cephALEXin (KEFLEX) 500 MG Cap Take 1 Capsule by mouth 4 times a day for 5 days. 20 Capsule 0    celecoxib (CELEBREX) 200 MG Cap Take 1 Capsule by mouth 2 times a day. 60 Capsule 5    Dulaglutide (TRULICITY) 1.5 MG/0.5ML Solution Pen-injector Inject 0.5 mL under the skin every 7 days. 8 mL 3    metFORMIN ER (GLUCOPHAGE XR) 500 MG TABLET SR 24 HR TAKE ONE TABLET BY MOUTH THREE TIMES A DAY WITH MEALS FOR 90 DAYS 270 Tablet 3    levothyroxine (SYNTHROID) 75 MCG Tab Take 1 Tablet by mouth every morning on an empty stomach. 90 Tablet 3    traZODone (DESYREL) 100 MG Tab Take 1 Tablet by mouth at bedtime as needed for Sleep. 90 Tablet 3    rosuvastatin (CRESTOR) 5 MG Tab TAKE ONE TABLET BY MOUTH EVERY EVENING 90 Tablet 3    fluticasone (FLONASE) 50 MCG/ACT nasal spray Administer 1 Spray into affected nostril(S) every day. 16 g 0    gabapentin (NEURONTIN) 300 MG Cap Take 1 Capsule by mouth 3 times a day as needed (pain). 90 Capsule 5    diclofenac sodium (VOLTAREN) 1 % Gel Apply 2 g topically 4 times a day as needed (apply to affected area as needed). 150 g 4    tizanidine (ZANAFLEX) 2 MG tablet Take 1 Tablet by mouth 3 times a day as needed. 60 Tablet 1    CALCIUM PO Take 2 Tablets by mouth in the morning, at noon, and at bedtime.      Prenatal Vit-Fe Fumarate-FA (PRENATAL PO) Take 1 tablet by mouth in the morning, at noon, and at bedtime.      Cholecalciferol (D3 PO) Take 2 Capsules by mouth every day.      Thiamine HCl (B-1 PO) Take 1 tablet by mouth every day.      Ascorbic Acid (VITAMIN C) 1000 MG  "Tab Take 1,000 mg by mouth every day.      Cyanocobalamin (VITAMIN B-12 PO) Take 1 tablet by mouth every day.      ferrous sulfate 325 (65 Fe) MG tablet Take 325 mg by mouth every 7 days. On Friday      COENZYME Q10 PO Take 1 capsule by mouth every day.      glucose blood (ONE TOUCH ULTRA TEST) strip 1 Strip by Other route 3 times a day as needed. 100 Strip 2    Blood Glucose Monitoring Suppl Device Meter: Dispense Device of Insurance Preference. Sig. Use as directed for blood sugar monitoring. #1. NR. 1 Device 0    Lancets Lancets order: Lancets for One Touch Ultra meter. Sig: use once daily 100 Each 3     No current facility-administered medications for this visit.     Past Medical/ Surgical History  She  has a past medical history of Allergy, Anesthesia, Anxiety, Diabetes (HCC), High cholesterol, History of gastric bypass (5/7/2012), History of gout (8/17/2017), total hysterectomy (1/15/2015), total hysterectomy (1/15/2015), Pain, PONV (postoperative nausea and vomiting), Psychiatric problem, and Thyroid disease.  She  has a past surgical history that includes abdominal exploration; eye surgery; hernia repair; gastric bypass laparoscopic; and lumbar fusion o-arm (6/21/2021).     Objective:     /80 (BP Location: Right arm, Patient Position: Sitting, BP Cuff Size: Adult)   Pulse 86   Temp 36.4 °C (97.5 °F) (Temporal)   Resp 16   Ht 1.651 m (5' 5\")   Wt 62 kg (136 lb 11 oz)   SpO2 98%  Body mass index is 22.75 kg/m².    Physical Exam  Constitutional:       General: She is not in acute distress.  HENT:      Head: Normocephalic and atraumatic.   Eyes:      Conjunctiva/sclera: Conjunctivae normal.      Pupils: Pupils are equal, round, and reactive to light.   Pulmonary:      Effort: Pulmonary effort is normal. No respiratory distress.   Abdominal:      General: There is no distension.   Musculoskeletal:      Cervical back: Normal range of motion and neck supple.   Skin:     General: Skin is warm and dry.    "   Findings: Erythema and lesion present. No rash.          Neurological:      Mental Status: She is alert and oriented to person, place, and time.      Gait: Gait is intact.   Psychiatric:         Mood and Affect: Affect normal.          Imaging:  No new imaging    Labs  No updated labs    Assessment and Plan:   The following treatment plan was discussed     Problem List Items Addressed This Visit       Skin lesion     Acute.  Physical exam shows erythema, induration, tenderness, heat to buttocks proximal to the groin.  Consistent with folliculitis turning into cellulitis.  Recommended warm compresses, sitz bath's to try to drop fluid.  Did offer the patient I&D, patient declined.  We will initiate Keflex 500 mg 4 times a day x5 days.  Discussed that this medication is close relation to penicillins she is currently allergic to.  If she does develop a rash with this medication she is to notify me and take Benadryl.           Relevant Medications    cephALEXin (KEFLEX) 500 MG Cap        Followup: Return if symptoms worsen or fail to improve.      Please note that this dictation was created using voice recognition software. I have made every reasonable attempt to correct obvious errors, but I expect that there are errors of grammar and possibly content that I did not discover before finalizing the note.

## 2023-07-15 NOTE — TELEPHONE ENCOUNTER
From: Che Tovar  To: ONELIA Johnson  Sent: 1/28/2021 12:42 PM PST  Subject: Non-Urgent Medical Question    not really. can I have a different refferal?      ----- Message -----   From:ONELIA Johnson   Sent:1/28/2021 12:21 PM PST   To:Che Tovar   Subject:RE: Non-Urgent Medical Question    Yes, I can do a referral for you. It looks like you have seen Dr. Dang in the past. Would you like to see him again?      ----- Message -----   From:Che Tovar   Sent:1/28/2021 11:59 AM PST   To:ONELIA Johnson   Subject:Non-Urgent Medical Question    do I need a referral for a diabetic eye exam?  
pmd

## 2023-08-22 ENCOUNTER — HOSPITAL ENCOUNTER (OUTPATIENT)
Dept: LAB | Facility: MEDICAL CENTER | Age: 68
End: 2023-08-22
Attending: FAMILY MEDICINE
Payer: MEDICARE

## 2023-08-22 DIAGNOSIS — E03.9 ACQUIRED HYPOTHYROIDISM: ICD-10-CM

## 2023-08-22 DIAGNOSIS — E11.9 TYPE 2 DIABETES MELLITUS WITHOUT RETINOPATHY (HCC): ICD-10-CM

## 2023-08-22 LAB
ALBUMIN SERPL BCP-MCNC: 4.5 G/DL (ref 3.2–4.9)
ALBUMIN/GLOB SERPL: 1.3 G/DL
ALP SERPL-CCNC: 82 U/L (ref 30–99)
ALT SERPL-CCNC: 20 U/L (ref 2–50)
ANION GAP SERPL CALC-SCNC: 12 MMOL/L (ref 7–16)
AST SERPL-CCNC: 21 U/L (ref 12–45)
BILIRUB SERPL-MCNC: 1 MG/DL (ref 0.1–1.5)
BUN SERPL-MCNC: 10 MG/DL (ref 8–22)
CALCIUM ALBUM COR SERPL-MCNC: 9.4 MG/DL (ref 8.5–10.5)
CALCIUM SERPL-MCNC: 9.8 MG/DL (ref 8.4–10.2)
CHLORIDE SERPL-SCNC: 101 MMOL/L (ref 96–112)
CO2 SERPL-SCNC: 25 MMOL/L (ref 20–33)
CREAT SERPL-MCNC: 0.7 MG/DL (ref 0.5–1.4)
EST. AVERAGE GLUCOSE BLD GHB EST-MCNC: 131 MG/DL
FASTING STATUS PATIENT QL REPORTED: NORMAL
GFR SERPLBLD CREATININE-BSD FMLA CKD-EPI: 93 ML/MIN/1.73 M 2
GLOBULIN SER CALC-MCNC: 3.6 G/DL (ref 1.9–3.5)
GLUCOSE SERPL-MCNC: 119 MG/DL (ref 65–99)
HBA1C MFR BLD: 6.2 % (ref 4–5.6)
POTASSIUM SERPL-SCNC: 4.5 MMOL/L (ref 3.6–5.5)
PROT SERPL-MCNC: 8.1 G/DL (ref 6–8.2)
SODIUM SERPL-SCNC: 138 MMOL/L (ref 135–145)
TSH SERPL DL<=0.005 MIU/L-ACNC: 3.47 UIU/ML (ref 0.38–5.33)

## 2023-08-22 PROCEDURE — 80053 COMPREHEN METABOLIC PANEL: CPT

## 2023-08-22 PROCEDURE — 36415 COLL VENOUS BLD VENIPUNCTURE: CPT

## 2023-08-22 PROCEDURE — 83036 HEMOGLOBIN GLYCOSYLATED A1C: CPT | Mod: GA

## 2023-08-22 PROCEDURE — 84443 ASSAY THYROID STIM HORMONE: CPT

## 2023-09-07 ENCOUNTER — APPOINTMENT (OUTPATIENT)
Dept: MEDICAL GROUP | Facility: MEDICAL CENTER | Age: 68
End: 2023-09-07
Payer: MEDICARE

## 2023-11-07 RX ORDER — GABAPENTIN 300 MG/1
CAPSULE ORAL
Qty: 90 CAPSULE | Refills: 5 | Status: SHIPPED | OUTPATIENT
Start: 2023-11-07

## 2023-11-22 DIAGNOSIS — M77.12 LATERAL EPICONDYLITIS OF LEFT ELBOW: ICD-10-CM

## 2023-11-22 DIAGNOSIS — M75.22 BICEPS TENDINITIS OF LEFT UPPER EXTREMITY: ICD-10-CM

## 2023-11-27 RX ORDER — CELECOXIB 200 MG/1
200 CAPSULE ORAL 2 TIMES DAILY
Qty: 180 CAPSULE | Refills: 3 | Status: SHIPPED | OUTPATIENT
Start: 2023-11-27

## 2023-12-02 DIAGNOSIS — E03.9 ACQUIRED HYPOTHYROIDISM: ICD-10-CM

## 2023-12-04 RX ORDER — LEVOTHYROXINE SODIUM 0.07 MG/1
75 TABLET ORAL
Qty: 90 TABLET | Refills: 3 | Status: SHIPPED | OUTPATIENT
Start: 2023-12-04

## 2023-12-13 DIAGNOSIS — E78.5 HYPERLIPIDEMIA DUE TO TYPE 2 DIABETES MELLITUS (HCC): ICD-10-CM

## 2023-12-13 DIAGNOSIS — E11.69 HYPERLIPIDEMIA DUE TO TYPE 2 DIABETES MELLITUS (HCC): ICD-10-CM

## 2023-12-13 NOTE — TELEPHONE ENCOUNTER
Received request via: Pharmacy    Was the patient seen in the last year in this department? Yes    Does the patient have an active prescription (recently filled or refills available) for medication(s) requested? yes    Does the patient have halfway Plus and need 100 day supply (blood pressure, diabetes and cholesterol meds only)? Patient does not have SCP  Requested Prescriptions     Pending Prescriptions Disp Refills    rosuvastatin (CRESTOR) 5 MG Tab [Pharmacy Med Name: ROSUVASTATIN CALCIUM 5 MG TAB] 90 Tablet 3     Sig: TAKE ONE TABLET BY MOUTH EVERY EVENING

## 2023-12-14 RX ORDER — ROSUVASTATIN CALCIUM 5 MG/1
TABLET, COATED ORAL
Qty: 90 TABLET | Refills: 3 | Status: SHIPPED | OUTPATIENT
Start: 2023-12-14

## 2024-01-29 ENCOUNTER — TELEPHONE (OUTPATIENT)
Dept: HEALTH INFORMATION MANAGEMENT | Facility: OTHER | Age: 69
End: 2024-01-29
Payer: MEDICARE

## 2024-01-29 DIAGNOSIS — Z01.00 ENCOUNTER FOR VISION SCREENING: ICD-10-CM

## 2024-01-29 NOTE — TELEPHONE ENCOUNTER
1. Caller Name: Che Tovar                          Call Back Number: [phone]        How would the patient prefer to be contacted with a response: MyChart message    2. SPECIFIC Action To Be Taken: Referral pending, please sign.    3. Diagnosis/Clinical Reason for Request: Retinopathy Screening    4. Specialty & Provider Name/Lab/Imaging Location: [facility]     5. Is appointment scheduled for requested order/referral: no    Patient was informed they will receive a return phone call from the office ONLY if there are any questions before processing their request. Advised to call back if they haven't received a call from the referral department in 5 days.

## 2024-02-08 ENCOUNTER — APPOINTMENT (OUTPATIENT)
Dept: RADIOLOGY | Facility: MEDICAL CENTER | Age: 69
End: 2024-02-08
Attending: NEUROLOGICAL SURGERY
Payer: MEDICARE

## 2024-02-08 DIAGNOSIS — M54.16 LUMBAR RADICULOPATHY: ICD-10-CM

## 2024-02-08 PROCEDURE — 72131 CT LUMBAR SPINE W/O DYE: CPT

## 2024-03-04 ENCOUNTER — PATIENT MESSAGE (OUTPATIENT)
Dept: MEDICAL GROUP | Facility: MEDICAL CENTER | Age: 69
End: 2024-03-04
Payer: MEDICARE

## 2024-03-04 DIAGNOSIS — E11.9 TYPE 2 DIABETES MELLITUS WITHOUT RETINOPATHY (HCC): ICD-10-CM

## 2024-03-04 RX ORDER — DULAGLUTIDE 0.75 MG/.5ML
0.5 INJECTION, SOLUTION SUBCUTANEOUS
Qty: 2 ML | Refills: 0 | Status: SHIPPED | OUTPATIENT
Start: 2024-03-04

## 2024-03-04 SDOH — ECONOMIC STABILITY: INCOME INSECURITY: IN THE LAST 12 MONTHS, WAS THERE A TIME WHEN YOU WERE NOT ABLE TO PAY THE MORTGAGE OR RENT ON TIME?: NO

## 2024-03-04 SDOH — HEALTH STABILITY: PHYSICAL HEALTH: ON AVERAGE, HOW MANY MINUTES DO YOU ENGAGE IN EXERCISE AT THIS LEVEL?: 30 MIN

## 2024-03-04 SDOH — ECONOMIC STABILITY: FOOD INSECURITY: WITHIN THE PAST 12 MONTHS, YOU WORRIED THAT YOUR FOOD WOULD RUN OUT BEFORE YOU GOT MONEY TO BUY MORE.: NEVER TRUE

## 2024-03-04 SDOH — ECONOMIC STABILITY: INCOME INSECURITY: HOW HARD IS IT FOR YOU TO PAY FOR THE VERY BASICS LIKE FOOD, HOUSING, MEDICAL CARE, AND HEATING?: NOT HARD AT ALL

## 2024-03-04 SDOH — HEALTH STABILITY: PHYSICAL HEALTH: ON AVERAGE, HOW MANY DAYS PER WEEK DO YOU ENGAGE IN MODERATE TO STRENUOUS EXERCISE (LIKE A BRISK WALK)?: 2 DAYS

## 2024-03-04 SDOH — ECONOMIC STABILITY: FOOD INSECURITY: WITHIN THE PAST 12 MONTHS, THE FOOD YOU BOUGHT JUST DIDN'T LAST AND YOU DIDN'T HAVE MONEY TO GET MORE.: NEVER TRUE

## 2024-03-04 SDOH — ECONOMIC STABILITY: HOUSING INSECURITY

## 2024-03-04 SDOH — HEALTH STABILITY: MENTAL HEALTH

## 2024-03-04 ASSESSMENT — SOCIAL DETERMINANTS OF HEALTH (SDOH)
HOW OFTEN DO YOU GET TOGETHER WITH FRIENDS OR RELATIVES?: ONCE A WEEK
HOW MANY DRINKS CONTAINING ALCOHOL DO YOU HAVE ON A TYPICAL DAY WHEN YOU ARE DRINKING: 1 OR 2
HOW OFTEN DO YOU ATTENT MEETINGS OF THE CLUB OR ORGANIZATION YOU BELONG TO?: MORE THAN 4 TIMES PER YEAR
HOW HARD IS IT FOR YOU TO PAY FOR THE VERY BASICS LIKE FOOD, HOUSING, MEDICAL CARE, AND HEATING?: NOT HARD AT ALL
DO YOU BELONG TO ANY CLUBS OR ORGANIZATIONS SUCH AS CHURCH GROUPS UNIONS, FRATERNAL OR ATHLETIC GROUPS, OR SCHOOL GROUPS?: YES
IN A TYPICAL WEEK, HOW MANY TIMES DO YOU TALK ON THE PHONE WITH FAMILY, FRIENDS, OR NEIGHBORS?: THREE TIMES A WEEK
IN A TYPICAL WEEK, HOW MANY TIMES DO YOU TALK ON THE PHONE WITH FAMILY, FRIENDS, OR NEIGHBORS?: THREE TIMES A WEEK
HOW OFTEN DO YOU GET TOGETHER WITH FRIENDS OR RELATIVES?: ONCE A WEEK
HOW OFTEN DO YOU ATTENT MEETINGS OF THE CLUB OR ORGANIZATION YOU BELONG TO?: MORE THAN 4 TIMES PER YEAR
WITHIN THE PAST 12 MONTHS, YOU WORRIED THAT YOUR FOOD WOULD RUN OUT BEFORE YOU GOT THE MONEY TO BUY MORE: NEVER TRUE
HOW OFTEN DO YOU ATTEND CHURCH OR RELIGIOUS SERVICES?: MORE THAN 4 TIMES PER YEAR
DO YOU BELONG TO ANY CLUBS OR ORGANIZATIONS SUCH AS CHURCH GROUPS UNIONS, FRATERNAL OR ATHLETIC GROUPS, OR SCHOOL GROUPS?: YES
HOW OFTEN DO YOU ATTEND CHURCH OR RELIGIOUS SERVICES?: MORE THAN 4 TIMES PER YEAR
HOW OFTEN DO YOU HAVE A DRINK CONTAINING ALCOHOL: 4 OR MORE TIMES A WEEK
HOW OFTEN DO YOU HAVE SIX OR MORE DRINKS ON ONE OCCASION: NEVER

## 2024-03-04 ASSESSMENT — LIFESTYLE VARIABLES
HOW OFTEN DO YOU HAVE A DRINK CONTAINING ALCOHOL: 4 OR MORE TIMES A WEEK
HOW MANY STANDARD DRINKS CONTAINING ALCOHOL DO YOU HAVE ON A TYPICAL DAY: 1 OR 2
AUDIT-C TOTAL SCORE: 4
SKIP TO QUESTIONS 9-10: 1
HOW OFTEN DO YOU HAVE SIX OR MORE DRINKS ON ONE OCCASION: NEVER

## 2024-03-07 ENCOUNTER — APPOINTMENT (OUTPATIENT)
Dept: MEDICAL GROUP | Facility: MEDICAL CENTER | Age: 69
End: 2024-03-07
Payer: MEDICARE

## 2024-03-07 ENCOUNTER — PATIENT MESSAGE (OUTPATIENT)
Dept: MEDICAL GROUP | Facility: MEDICAL CENTER | Age: 69
End: 2024-03-07

## 2024-03-07 DIAGNOSIS — Z98.1 S/P LUMBAR SPINAL FUSION: ICD-10-CM

## 2024-03-07 RX ORDER — TRAZODONE HYDROCHLORIDE 100 MG/1
100 TABLET ORAL
Qty: 90 TABLET | Refills: 3 | Status: SHIPPED | OUTPATIENT
Start: 2024-03-07

## 2024-03-10 PROCEDURE — RXMED WILLOW AMBULATORY MEDICATION CHARGE: Performed by: FAMILY MEDICINE

## 2024-03-21 ENCOUNTER — PHARMACY VISIT (OUTPATIENT)
Dept: PHARMACY | Facility: MEDICAL CENTER | Age: 69
End: 2024-03-21
Payer: COMMERCIAL

## 2024-04-05 DIAGNOSIS — E11.9 TYPE 2 DIABETES MELLITUS WITHOUT RETINOPATHY (HCC): ICD-10-CM

## 2024-04-05 PROCEDURE — RXMED WILLOW AMBULATORY MEDICATION CHARGE: Performed by: STUDENT IN AN ORGANIZED HEALTH CARE EDUCATION/TRAINING PROGRAM

## 2024-04-05 RX ORDER — DULAGLUTIDE 0.75 MG/.5ML
0.5 INJECTION, SOLUTION SUBCUTANEOUS
Qty: 2 ML | Refills: 0 | Status: SHIPPED | OUTPATIENT
Start: 2024-04-05 | End: 2024-04-25

## 2024-04-05 NOTE — TELEPHONE ENCOUNTER
Received request via: Pharmacy    Was the patient seen in the last year in this department? Yes    Does the patient have an active prescription (recently filled or refills available) for medication(s) requested? No      Does the patient have nursing home Plus and need 100 day supply (blood pressure, diabetes and cholesterol meds only)? Patient does not have SCP

## 2024-04-11 ENCOUNTER — PHARMACY VISIT (OUTPATIENT)
Dept: PHARMACY | Facility: MEDICAL CENTER | Age: 69
End: 2024-04-11
Payer: COMMERCIAL

## 2024-04-23 ENCOUNTER — HOSPITAL ENCOUNTER (OUTPATIENT)
Dept: LAB | Facility: MEDICAL CENTER | Age: 69
End: 2024-04-23
Attending: NURSE PRACTITIONER
Payer: MEDICARE

## 2024-04-23 LAB
25(OH)D3 SERPL-MCNC: 50 NG/ML (ref 30–100)
ALBUMIN SERPL BCP-MCNC: 3.8 G/DL (ref 3.2–4.9)
ALBUMIN/GLOB SERPL: 1.1 G/DL
ALP SERPL-CCNC: 66 U/L (ref 30–99)
ALT SERPL-CCNC: 17 U/L (ref 2–50)
ANION GAP SERPL CALC-SCNC: 10 MMOL/L (ref 7–16)
AST SERPL-CCNC: 22 U/L (ref 12–45)
BILIRUB SERPL-MCNC: 0.7 MG/DL (ref 0.1–1.5)
BUN SERPL-MCNC: 11 MG/DL (ref 8–22)
CALCIUM ALBUM COR SERPL-MCNC: 9.3 MG/DL (ref 8.5–10.5)
CALCIUM SERPL-MCNC: 9.1 MG/DL (ref 8.4–10.2)
CHLORIDE SERPL-SCNC: 102 MMOL/L (ref 96–112)
CO2 SERPL-SCNC: 23 MMOL/L (ref 20–33)
CREAT SERPL-MCNC: 0.63 MG/DL (ref 0.5–1.4)
FASTING STATUS PATIENT QL REPORTED: NORMAL
GFR SERPLBLD CREATININE-BSD FMLA CKD-EPI: 95 ML/MIN/1.73 M 2
GLOBULIN SER CALC-MCNC: 3.6 G/DL (ref 1.9–3.5)
GLUCOSE SERPL-MCNC: 116 MG/DL (ref 65–99)
MAGNESIUM SERPL-MCNC: 1.7 MG/DL (ref 1.5–2.5)
PHOSPHATE SERPL-MCNC: 3.1 MG/DL (ref 2.5–4.5)
POTASSIUM SERPL-SCNC: 3.9 MMOL/L (ref 3.6–5.5)
PROT SERPL-MCNC: 7.4 G/DL (ref 6–8.2)
PTH-INTACT SERPL-MCNC: 46.5 PG/ML (ref 14–72)
SODIUM SERPL-SCNC: 135 MMOL/L (ref 135–145)
TSH SERPL DL<=0.005 MIU/L-ACNC: 2.16 UIU/ML (ref 0.38–5.33)

## 2024-04-23 PROCEDURE — 80053 COMPREHEN METABOLIC PANEL: CPT

## 2024-04-23 PROCEDURE — 84443 ASSAY THYROID STIM HORMONE: CPT

## 2024-04-23 PROCEDURE — 36415 COLL VENOUS BLD VENIPUNCTURE: CPT

## 2024-04-23 PROCEDURE — 83970 ASSAY OF PARATHORMONE: CPT

## 2024-04-23 PROCEDURE — 82306 VITAMIN D 25 HYDROXY: CPT

## 2024-04-23 PROCEDURE — 84100 ASSAY OF PHOSPHORUS: CPT

## 2024-04-23 PROCEDURE — 83735 ASSAY OF MAGNESIUM: CPT

## 2024-04-24 ENCOUNTER — PATIENT MESSAGE (OUTPATIENT)
Dept: MEDICAL GROUP | Facility: MEDICAL CENTER | Age: 69
End: 2024-04-24
Payer: MEDICARE

## 2024-04-24 DIAGNOSIS — E11.9 TYPE 2 DIABETES MELLITUS WITHOUT RETINOPATHY (HCC): ICD-10-CM

## 2024-04-24 RX ORDER — DULAGLUTIDE 0.75 MG/.5ML
1.5 INJECTION, SOLUTION SUBCUTANEOUS
Qty: 2 ML | Refills: 0 | Status: CANCELLED | OUTPATIENT
Start: 2024-04-24

## 2024-04-24 NOTE — PATIENT COMMUNICATION
Received request via: Patient    Was the patient seen in the last year in this department? Yes    Does the patient have an active prescription (recently filled or refills available) for medication(s) requested? No    Pharmacy Name:   [id number] - [place] - [address]  [address]  [facility] [address]  Phone: [phone] Fax: [phone]    [facility]  [address]  [facility] [address]  Phone: [phone] Fax: [phone]    Does the patient have half-way Plus and need 100 day supply (blood pressure, diabetes and cholesterol meds only)? Patient does not have SCP

## 2024-04-25 RX ORDER — DULAGLUTIDE 1.5 MG/.5ML
0.5 INJECTION, SOLUTION SUBCUTANEOUS
Qty: 2.24 ML | Refills: 0 | Status: SHIPPED | OUTPATIENT
Start: 2024-04-25

## 2024-05-20 DIAGNOSIS — E11.9 TYPE 2 DIABETES MELLITUS WITHOUT RETINOPATHY (HCC): ICD-10-CM

## 2024-05-20 RX ORDER — DULAGLUTIDE 1.5 MG/.5ML
INJECTION, SOLUTION SUBCUTANEOUS
Qty: 6 ML | Refills: 3 | Status: SHIPPED | OUTPATIENT
Start: 2024-05-20

## 2024-05-20 NOTE — TELEPHONE ENCOUNTER
Received request via: Pharmacy    Was the patient seen in the last year in this department? Yes    Does the patient have an active prescription (recently filled or refills available) for medication(s) requested? No    [facility]    Does the patient have nursing home Plus and need 100 day supply (blood pressure, diabetes and cholesterol meds only)? Patient does not have SCP

## 2024-05-22 NOTE — ASSESSMENT & PLAN NOTE
Acute.  Differentials include colitis, Crohn's, irritable bowel, ulceration.  I did recommend that we complete a CBC and a ferritin to ensure that she has not had a significant amount of blood loss.  I also placed a referral to endocrinology and to occult stool test.  I did recommend to the patient that if she is starting to get more persistent bloody diarrhea that she will need to seek treatment in the emergency room.   Reason for call:   [x] Refill   [] Prior Auth  [] Other:     Office:   [x] PCP/Provider -   [] Specialty/Provider -     Medication: metoprolol succinate (TOPROL-XL) 25 mg 24 hr tablet     Dose/Frequency: gary 1 tablet (25 mg total) by mouth daily     Quantity: 90    Pharmacy: [facility] #[id number] - [place] - [phone]       Does the patient have enough for 3 days?   [] Yes   [x] No - Send as HP to POD

## 2024-08-18 DIAGNOSIS — E11.9 TYPE 2 DIABETES MELLITUS WITHOUT COMPLICATION, WITHOUT LONG-TERM CURRENT USE OF INSULIN (HCC): ICD-10-CM

## 2024-08-19 RX ORDER — METFORMIN HCL 500 MG
500 TABLET, EXTENDED RELEASE 24 HR ORAL
Qty: 270 TABLET | Refills: 3 | Status: SHIPPED | OUTPATIENT
Start: 2024-08-19

## 2024-12-30 ENCOUNTER — HOSPITAL ENCOUNTER (OUTPATIENT)
Facility: MEDICAL CENTER | Age: 69
End: 2024-12-30
Attending: FAMILY MEDICINE
Payer: MEDICARE

## 2024-12-30 ENCOUNTER — OFFICE VISIT (OUTPATIENT)
Dept: MEDICAL GROUP | Facility: MEDICAL CENTER | Age: 69
End: 2024-12-30
Payer: MEDICARE

## 2024-12-30 VITALS
TEMPERATURE: 97.1 F | BODY MASS INDEX: 24.66 KG/M2 | OXYGEN SATURATION: 98 % | WEIGHT: 148 LBS | HEIGHT: 65 IN | SYSTOLIC BLOOD PRESSURE: 128 MMHG | DIASTOLIC BLOOD PRESSURE: 72 MMHG | HEART RATE: 78 BPM

## 2024-12-30 DIAGNOSIS — R09.89 CHRONIC SINUS COMPLAINTS: ICD-10-CM

## 2024-12-30 DIAGNOSIS — E78.5 HYPERLIPIDEMIA DUE TO TYPE 2 DIABETES MELLITUS (HCC): ICD-10-CM

## 2024-12-30 DIAGNOSIS — E11.9 TYPE 2 DIABETES MELLITUS WITHOUT RETINOPATHY (HCC): ICD-10-CM

## 2024-12-30 DIAGNOSIS — E11.69 HYPERLIPIDEMIA DUE TO TYPE 2 DIABETES MELLITUS (HCC): ICD-10-CM

## 2024-12-30 DIAGNOSIS — E03.9 HYPOTHYROIDISM, UNSPECIFIED TYPE: ICD-10-CM

## 2024-12-30 LAB
CREAT UR-MCNC: 89.43 MG/DL
HBA1C MFR BLD: 6.7 % (ref ?–5.8)
MICROALBUMIN UR-MCNC: <1.2 MG/DL
MICROALBUMIN/CREAT UR: NORMAL MG/G (ref 0–30)
POCT INT CON NEG: NEGATIVE
POCT INT CON POS: POSITIVE

## 2024-12-30 PROCEDURE — 82570 ASSAY OF URINE CREATININE: CPT

## 2024-12-30 PROCEDURE — 82043 UR ALBUMIN QUANTITATIVE: CPT

## 2024-12-30 RX ORDER — DOXYCYCLINE HYCLATE 100 MG
100 TABLET ORAL 2 TIMES DAILY
Qty: 14 TABLET | Refills: 0 | Status: SHIPPED | OUTPATIENT
Start: 2024-12-30

## 2024-12-30 RX ORDER — DULAGLUTIDE 3 MG/.5ML
3 INJECTION, SOLUTION SUBCUTANEOUS
Qty: 6 ML | Refills: 3 | Status: SHIPPED | OUTPATIENT
Start: 2024-12-30

## 2024-12-30 RX ORDER — PREDNISONE 20 MG/1
TABLET ORAL
Qty: 15 TABLET | Refills: 0 | Status: SHIPPED | OUTPATIENT
Start: 2024-12-30 | End: 2025-01-06

## 2024-12-30 ASSESSMENT — FIBROSIS 4 INDEX: FIB4 SCORE: 1.58

## 2024-12-30 NOTE — PROGRESS NOTES
Verbal consent was acquired by the patient to use [employer] ambient listening note generation during this visit:  Yes      Chief complaint::Diagnoses of Chronic sinus complaints, Type 2 diabetes mellitus without retinopathy (HCC), Hyperlipidemia due to type 2 diabetes mellitus (HCC), and Hypothyroidism, unspecified type were pertinent to this visit.    Assessment and Plan:   The following treatment plan was discussed:     Assessment & Plan  1. Chronic sinusitis - Chronic recurring condition with continued symptoms despite current medications.  - Prescribed doxycycline 100 mg BID for 7 days  - Prescribed prednisone 60 mg daily for 3 days, tapering to 40 mg daily for 2 days, then 20 mg daily for 2 days  - ENT referral if no relief    2. Type 2 diabetes with hyperlipidemia - Chronic unstable condition with increased A1c to 6.7%.  - Increase Trulicity to 3 mg weekly  - Refill sent to [facility] pharmacy    Follow-up  - Follow up in 6 months for annual visit and labs.  Concetta was seen today for sinus problem.    Diagnoses and all orders for this visit:    Chronic sinus complaints  -     doxycycline (VIBRAMYCIN) 100 MG Tab; Take 1 Tablet by mouth 2 times a day.  -     predniSONE (DELTASONE) 20 MG Tab; Take 3 Tablets by mouth every day for 3 days, THEN 2 Tablets every day for 2 days, THEN 1 Tablet every day for 2 days.  -     Referral to ENT    Type 2 diabetes mellitus without retinopathy (HCC)  -     Dulaglutide (TRULICITY) 3 MG/0.5ML Solution Auto-injector; Inject 3 mg under the skin every 7 days.  -     CBC WITH DIFFERENTIAL; Future  -     Comp Metabolic Panel; Future  -     ESTIMATED GFR; Future  -     HEMOGLOBIN A1C; Future    Hyperlipidemia due to type 2 diabetes mellitus (HCC)  -     POCT Microalbumin Creat Ratio Urine; Future  -     POCT Hemoglobin A1C  -     Diabetic Monofilament LE Exam  -     Dulaglutide (TRULICITY) 3 MG/0.5ML Solution Auto-injector; Inject 3 mg under the skin every 7 days.  -     Lipid  Profile; Future    Hypothyroidism, unspecified type  -     PTH INTACT (PTH ONLY); Future  -     TSH+FREE T4        Followup: Return in about 6 months (around 6/30/2025) for Annual.    I have placed POCT A1C orders.  The MA is preforming POCT A1C orders under the direction of Dr. Bates    Subjective/HPI:   HPI:    Che Tovar is a pleasant 69 y.o. female here for   Chief Complaint   Patient presents with    Sinus Problem     X a few months ,         History of Present Illness  The patient is a 69-year-old individual with chronic sinus complaints and type 2 diabetes with hyperlipidemia.    They have had sinus symptoms for 2 months, starting with ocular discharge. Warm compresses have not helped. They report postnasal drip, throat clearing, and pressure behind their eyes. No recent fevers or body aches. They avoid neti pot rinses. Current treatment includes Flonase twice daily and an OTC cold medication with antihistamine and dextromethorphan. No use of phenylephrine or Afrin. Mild COVID-19 3 months ago.    They are concerned about weight gain and request an increase in Trulicity dosage.    ALLERGIES  Allergic to NSAIDs, hydrocodone 10, and doxycycline.    MEDICATIONS  Current: Flonase, antihistamine dextromethorphan, Trulicity    Current Medicines (including changes today)  Current Outpatient Medications   Medication Sig Dispense Refill    Dulaglutide (TRULICITY) 3 MG/0.5ML Solution Auto-injector Inject 3 mg under the skin every 7 days. 6 mL 3    doxycycline (VIBRAMYCIN) 100 MG Tab Take 1 Tablet by mouth 2 times a day. 14 Tablet 0    predniSONE (DELTASONE) 20 MG Tab Take 3 Tablets by mouth every day for 3 days, THEN 2 Tablets every day for 2 days, THEN 1 Tablet every day for 2 days. 15 Tablet 0    metFORMIN ER (GLUCOPHAGE XR) 500 MG TABLET SR 24 HR TAKE ONE TABLET BY MOUTH THREE TIMES A DAY WITH MEALS 270 Tablet 3    traZODone (DESYREL) 100 MG Tab TAKE ONE TABLET BY MOUTH EVERY NIGHT AT BEDTIME AS  NEEDED FOR SLEEP 90 Tablet 3    rosuvastatin (CRESTOR) 5 MG Tab TAKE ONE TABLET BY MOUTH EVERY EVENING 90 Tablet 3    levothyroxine (SYNTHROID) 75 MCG Tab TAKE ONE TABLET BY MOUTH EVERY MORNING ON AN EMPTY STOMACH 90 Tablet 3    celecoxib (CELEBREX) 200 MG Cap TAKE ONE CAPSULE BY MOUTH TWICE A  Capsule 3    gabapentin (NEURONTIN) 300 MG Cap TAKE ONE CAPSULE BY MOUTH THREE TIMES A DAY AS NEEDED FOR PAIN 90 Capsule 5    fluticasone (FLONASE) 50 MCG/ACT nasal spray Administer 1 Spray into affected nostril(S) every day. 16 g 0    diclofenac sodium (VOLTAREN) 1 % Gel Apply 2 g topically 4 times a day as needed (apply to affected area as needed). 150 g 4    tizanidine (ZANAFLEX) 2 MG tablet Take 1 Tablet by mouth 3 times a day as needed. 60 Tablet 1    CALCIUM PO Take 2 Tablets by mouth in the morning, at noon, and at bedtime.      Prenatal Vit-Fe Fumarate-FA (PRENATAL PO) Take 1 tablet by mouth in the morning, at noon, and at bedtime.      Cholecalciferol (D3 PO) Take 2 Capsules by mouth every day.      Thiamine HCl (B-1 PO) Take 1 tablet by mouth every day.      Ascorbic Acid (VITAMIN C) 1000 MG Tab Take 1,000 mg by mouth every day.      Cyanocobalamin (VITAMIN B-12 PO) Take 1 tablet by mouth every day.      ferrous sulfate 325 (65 Fe) MG tablet Take 325 mg by mouth every 7 days. On Friday      COENZYME Q10 PO Take 1 capsule by mouth every day.      glucose blood (ONE TOUCH ULTRA TEST) strip 1 Strip by Other route 3 times a day as needed. 100 Strip 2    Blood Glucose Monitoring Suppl Device Meter: Dispense Device of Insurance Preference. Sig. Use as directed for blood sugar monitoring. #1. NR. 1 Device 0    Lancets Lancets order: Lancets for One Touch Ultra meter. Sig: use once daily 100 Each 3     No current facility-administered medications for this visit.     Past Medical/ Surgical History  She  has a past medical history of Allergy, Anesthesia, Anxiety, Diabetes (HCC), High cholesterol, History of gastric  "bypass (5/7/2012), History of gout (8/17/2017), total hysterectomy (1/15/2015), total hysterectomy (1/15/2015), Pain, PONV (postoperative nausea and vomiting), Psychiatric problem, and Thyroid disease.  She  has a past surgical history that includes abdominal exploration; eye surgery; hernia repair; gastric bypass laparoscopic; and lumbar fusion o-arm (6/21/2021).       Objective:   /72   Pulse 78   Temp 36.2 °C (97.1 °F)   Ht 1.651 m (5' 5\")   Wt 67.1 kg (148 lb)   SpO2 98%  Body mass index is 24.63 kg/m².    Physical Exam  Constitutional:       General: She is not in acute distress.     Appearance: She is not ill-appearing or toxic-appearing.   HENT:      Head: Normocephalic.      Right Ear: Tympanic membrane and external ear normal.      Left Ear: Tympanic membrane and external ear normal.      Nose: Congestion present. No rhinorrhea.      Left Turbinates: Swollen.      Right Sinus: Frontal sinus tenderness present. No maxillary sinus tenderness.      Left Sinus: Frontal sinus tenderness present. No maxillary sinus tenderness.      Mouth/Throat:      Mouth: Mucous membranes are moist.      Pharynx: Oropharynx is clear. Postnasal drip present. No posterior oropharyngeal erythema.   Eyes:      General:         Right eye: No discharge.         Left eye: No discharge.      Conjunctiva/sclera: Conjunctivae normal.      Pupils: Pupils are equal, round, and reactive to light.   Cardiovascular:      Rate and Rhythm: Normal rate and regular rhythm.      Heart sounds: No murmur heard.  Pulmonary:      Effort: Pulmonary effort is normal. No respiratory distress.      Breath sounds: Normal breath sounds. No wheezing.   Abdominal:      General: Abdomen is flat.   Musculoskeletal:         General: No swelling or tenderness.      Cervical back: Normal range of motion and neck supple.      Right lower leg: No edema.      Left lower leg: No edema.   Skin:     General: Skin is warm.   Neurological:      General: No " focal deficit present.      Mental Status: She is alert and oriented to person, place, and time. Mental status is at baseline.   Psychiatric:         Mood and Affect: Mood normal.        Monofilament testing with a 10 gram force: sensation intact: intact bilaterally  Visual Inspection: Feet without maceration, ulcers, fissures.  Pedal pulses: intact bilaterally    Lab/ Imaging Results:  Results  - Laboratory Studies:    - A1c: 6.7    Please note that this dictation was created using voice recognition software. I have made every reasonable attempt to correct obvious errors, but I expect that there are errors of grammar and possibly content that I did not discover before finalizing the note.

## 2025-02-06 DIAGNOSIS — E03.9 ACQUIRED HYPOTHYROIDISM: ICD-10-CM

## 2025-02-06 RX ORDER — LEVOTHYROXINE SODIUM 75 UG/1
75 TABLET ORAL
Qty: 90 TABLET | Refills: 3 | Status: SHIPPED | OUTPATIENT
Start: 2025-02-06

## 2025-02-27 ENCOUNTER — HOSPITAL ENCOUNTER (OUTPATIENT)
Facility: MEDICAL CENTER | Age: 70
End: 2025-02-27
Attending: FAMILY MEDICINE
Payer: MEDICARE

## 2025-02-27 DIAGNOSIS — M77.12 LATERAL EPICONDYLITIS OF LEFT ELBOW: ICD-10-CM

## 2025-02-27 DIAGNOSIS — E78.5 HYPERLIPIDEMIA DUE TO TYPE 2 DIABETES MELLITUS (HCC): ICD-10-CM

## 2025-02-27 DIAGNOSIS — E11.69 HYPERLIPIDEMIA DUE TO TYPE 2 DIABETES MELLITUS (HCC): ICD-10-CM

## 2025-02-27 DIAGNOSIS — E03.9 HYPOTHYROIDISM, UNSPECIFIED TYPE: ICD-10-CM

## 2025-02-27 DIAGNOSIS — M75.22 BICEPS TENDINITIS OF LEFT UPPER EXTREMITY: ICD-10-CM

## 2025-02-27 DIAGNOSIS — E11.9 TYPE 2 DIABETES MELLITUS WITHOUT RETINOPATHY (HCC): ICD-10-CM

## 2025-02-27 LAB
ALBUMIN SERPL BCP-MCNC: 4.2 G/DL (ref 3.2–4.9)
ALBUMIN/GLOB SERPL: 1.2 G/DL
ALP SERPL-CCNC: 71 U/L (ref 30–99)
ALT SERPL-CCNC: 26 U/L (ref 2–50)
ANION GAP SERPL CALC-SCNC: 14 MMOL/L (ref 7–16)
AST SERPL-CCNC: 26 U/L (ref 12–45)
BASOPHILS # BLD AUTO: 0.7 % (ref 0–1.8)
BASOPHILS # BLD: 0.03 K/UL (ref 0–0.12)
BILIRUB SERPL-MCNC: 1 MG/DL (ref 0.1–1.5)
BUN SERPL-MCNC: 9 MG/DL (ref 8–22)
CALCIUM ALBUM COR SERPL-MCNC: 9.5 MG/DL (ref 8.5–10.5)
CALCIUM SERPL-MCNC: 9.7 MG/DL (ref 8.4–10.2)
CHLORIDE SERPL-SCNC: 101 MMOL/L (ref 96–112)
CHOLEST SERPL-MCNC: 213 MG/DL (ref 100–199)
CO2 SERPL-SCNC: 22 MMOL/L (ref 20–33)
CREAT SERPL-MCNC: 0.88 MG/DL (ref 0.5–1.4)
EOSINOPHIL # BLD AUTO: 0.06 K/UL (ref 0–0.51)
EOSINOPHIL NFR BLD: 1.5 % (ref 0–6.9)
ERYTHROCYTE [DISTWIDTH] IN BLOOD BY AUTOMATED COUNT: 46.2 FL (ref 35.9–50)
EST. AVERAGE GLUCOSE BLD GHB EST-MCNC: 154 MG/DL
FASTING STATUS PATIENT QL REPORTED: NORMAL
GFR SERPLBLD CREATININE-BSD FMLA CKD-EPI: 70 ML/MIN/1.73 M 2
GLOBULIN SER CALC-MCNC: 3.4 G/DL (ref 1.9–3.5)
GLUCOSE SERPL-MCNC: 142 MG/DL (ref 65–99)
HBA1C MFR BLD: 7 % (ref 4–5.6)
HCT VFR BLD AUTO: 41.5 % (ref 37–47)
HDLC SERPL-MCNC: 74 MG/DL
HGB BLD-MCNC: 13.9 G/DL (ref 12–16)
IMM GRANULOCYTES # BLD AUTO: 0.01 K/UL (ref 0–0.11)
IMM GRANULOCYTES NFR BLD AUTO: 0.2 % (ref 0–0.9)
LDLC SERPL CALC-MCNC: 115 MG/DL
LYMPHOCYTES # BLD AUTO: 1.48 K/UL (ref 1–4.8)
LYMPHOCYTES NFR BLD: 35.9 % (ref 22–41)
MCH RBC QN AUTO: 32.1 PG (ref 27–33)
MCHC RBC AUTO-ENTMCNC: 33.5 G/DL (ref 32.2–35.5)
MCV RBC AUTO: 95.8 FL (ref 81.4–97.8)
MONOCYTES # BLD AUTO: 0.46 K/UL (ref 0–0.85)
MONOCYTES NFR BLD AUTO: 11.2 % (ref 0–13.4)
NEUTROPHILS # BLD AUTO: 2.08 K/UL (ref 1.82–7.42)
NEUTROPHILS NFR BLD: 50.5 % (ref 44–72)
NRBC # BLD AUTO: 0 K/UL
NRBC BLD-RTO: 0 /100 WBC (ref 0–0.2)
PLATELET # BLD AUTO: 243 K/UL (ref 164–446)
PMV BLD AUTO: 11.8 FL (ref 9–12.9)
POTASSIUM SERPL-SCNC: 4.3 MMOL/L (ref 3.6–5.5)
PROT SERPL-MCNC: 7.6 G/DL (ref 6–8.2)
RBC # BLD AUTO: 4.33 M/UL (ref 4.2–5.4)
SODIUM SERPL-SCNC: 137 MMOL/L (ref 135–145)
T4 FREE SERPL-MCNC: 1.44 NG/DL (ref 0.93–1.7)
TRIGL SERPL-MCNC: 118 MG/DL (ref 0–149)
TSH SERPL DL<=0.005 MIU/L-ACNC: 3.14 UIU/ML (ref 0.38–5.33)
WBC # BLD AUTO: 4.1 K/UL (ref 4.8–10.8)

## 2025-02-27 PROCEDURE — 80061 LIPID PANEL: CPT

## 2025-02-27 PROCEDURE — 80053 COMPREHEN METABOLIC PANEL: CPT

## 2025-02-27 PROCEDURE — 84443 ASSAY THYROID STIM HORMONE: CPT

## 2025-02-27 PROCEDURE — 83036 HEMOGLOBIN GLYCOSYLATED A1C: CPT | Mod: GA

## 2025-02-27 PROCEDURE — 83970 ASSAY OF PARATHORMONE: CPT

## 2025-02-27 PROCEDURE — 36415 COLL VENOUS BLD VENIPUNCTURE: CPT

## 2025-02-27 PROCEDURE — 85025 COMPLETE CBC W/AUTO DIFF WBC: CPT

## 2025-02-27 PROCEDURE — 84439 ASSAY OF FREE THYROXINE: CPT

## 2025-02-27 RX ORDER — CELECOXIB 200 MG/1
200 CAPSULE ORAL 2 TIMES DAILY
Qty: 180 CAPSULE | Refills: 3 | Status: SHIPPED | OUTPATIENT
Start: 2025-02-27

## 2025-02-27 NOTE — TELEPHONE ENCOUNTER
Received request via: Pharmacy    Was the patient seen in the last year in this department? Yes    Does the patient have an active prescription (recently filled or refills available) for medication(s) requested? No    [facility]: [facility] [id number] [place] - [address]     Does the patient have [facility] Plus and need 100-day supply? (This applies to ALL medications) Patient does not have SCP

## 2025-02-28 ENCOUNTER — RESULTS FOLLOW-UP (OUTPATIENT)
Dept: MEDICAL GROUP | Facility: MEDICAL CENTER | Age: 70
End: 2025-02-28
Payer: MEDICARE

## 2025-02-28 LAB — PTH-INTACT SERPL-MCNC: 38.9 PG/ML (ref 14–72)

## 2025-03-23 ENCOUNTER — PATIENT MESSAGE (OUTPATIENT)
Dept: MEDICAL GROUP | Facility: MEDICAL CENTER | Age: 70
End: 2025-03-23
Payer: MEDICARE

## 2025-03-23 DIAGNOSIS — E11.9 TYPE 2 DIABETES MELLITUS WITHOUT RETINOPATHY (HCC): ICD-10-CM

## 2025-03-25 RX ORDER — AVOBENZONE, HOMOSALATE, OCTISALATE, OCTOCRYLENE 30; 40; 45; 26 MG/ML; MG/ML; MG/ML; MG/ML
CREAM TOPICAL
Qty: 100 EACH | Refills: 11 | Status: SHIPPED | OUTPATIENT
Start: 2025-03-25

## 2025-03-25 RX ORDER — GLUCOSAMINE HCL/CHONDROITIN SU 500-400 MG
CAPSULE ORAL
Qty: 100 EACH | Refills: 11 | Status: SHIPPED | OUTPATIENT
Start: 2025-03-25

## 2025-04-15 NOTE — Clinical Note
REFERRAL APPROVAL NOTICE         Sent on April 15, 2025                   Concetta Tovar  [address]  [place] [address]                   Dear Ms. Tovar,    After a careful review of the medical information and benefit coverage, [facility] has processed your referral. See below for additional details.    If applicable, you must be actively enrolled with your insurance for coverage of the authorized service. If you have any questions regarding your coverage, please contact your insurance directly.    REFERRAL INFORMATION   Referral #:  [id number]  Referred-To Department    Referred-By Provider:  Physical Therapy    ONELIA French   Phys Therapy [facility]      [address]  [place] [address]  [phone] [address]  [place] [address]  [phone]    Referral Start Date:  04/15/2025  Referral End Date:   04/15/2026             SCHEDULING  If you do not already have an appointment, please call [phone] to make an appointment.     MORE INFORMATION  If you do not already have a [employer] account, sign up at: [email or web address]  You can access your medical information, make appointments, see lab results, billing information, and more.  If you have questions regarding this referral, please contact  the [facility] Referrals department at:             [phone]. Monday - Friday 8:00AM - 5:00PM.     Sincerely,    [facility]

## 2025-04-16 ENCOUNTER — HOSPITAL ENCOUNTER (OUTPATIENT)
Dept: RADIOLOGY | Facility: MEDICAL CENTER | Age: 70
End: 2025-04-16
Attending: FAMILY MEDICINE
Payer: MEDICARE

## 2025-04-16 DIAGNOSIS — Z12.31 VISIT FOR SCREENING MAMMOGRAM: ICD-10-CM

## 2025-04-16 PROCEDURE — 77063 BREAST TOMOSYNTHESIS BI: CPT

## 2025-04-17 DIAGNOSIS — R92.8 ABNORMAL FINDING ON BREAST IMAGING: ICD-10-CM

## 2025-04-25 ENCOUNTER — HOSPITAL ENCOUNTER (OUTPATIENT)
Dept: RADIOLOGY | Facility: MEDICAL CENTER | Age: 70
End: 2025-04-25
Attending: FAMILY MEDICINE
Payer: MEDICARE

## 2025-04-25 ENCOUNTER — RESULTS FOLLOW-UP (OUTPATIENT)
Dept: MEDICAL GROUP | Facility: MEDICAL CENTER | Age: 70
End: 2025-04-25
Payer: MEDICARE

## 2025-04-25 DIAGNOSIS — R92.8 ABNORMAL FINDING ON BREAST IMAGING: ICD-10-CM

## 2025-04-25 DIAGNOSIS — R92.8 ABNORMAL MAMMOGRAM: ICD-10-CM

## 2025-04-25 PROCEDURE — G0279 TOMOSYNTHESIS, MAMMO: HCPCS

## 2025-04-25 PROCEDURE — 76642 ULTRASOUND BREAST LIMITED: CPT | Mod: LT

## 2025-05-27 RX ORDER — TRAZODONE HYDROCHLORIDE 100 MG/1
100 TABLET ORAL
Qty: 90 TABLET | Refills: 3 | Status: SHIPPED | OUTPATIENT
Start: 2025-05-27

## 2025-06-29 SDOH — HEALTH STABILITY: MENTAL HEALTH
STRESS IS WHEN SOMEONE FEELS TENSE, NERVOUS, ANXIOUS, OR CAN'T SLEEP AT NIGHT BECAUSE THEIR MIND IS TROUBLED. HOW STRESSED ARE YOU?: VERY MUCH

## 2025-06-29 SDOH — HEALTH STABILITY: PHYSICAL HEALTH: ON AVERAGE, HOW MANY DAYS PER WEEK DO YOU ENGAGE IN MODERATE TO STRENUOUS EXERCISE (LIKE A BRISK WALK)?: 3 DAYS

## 2025-06-29 SDOH — ECONOMIC STABILITY: INCOME INSECURITY: IN THE LAST 12 MONTHS, WAS THERE A TIME WHEN YOU WERE NOT ABLE TO PAY THE MORTGAGE OR RENT ON TIME?: NO

## 2025-06-29 SDOH — ECONOMIC STABILITY: TRANSPORTATION INSECURITY

## 2025-06-29 SDOH — HEALTH STABILITY: PHYSICAL HEALTH: ON AVERAGE, HOW MANY MINUTES DO YOU ENGAGE IN EXERCISE AT THIS LEVEL?: 20 MIN

## 2025-06-29 ASSESSMENT — LIFESTYLE VARIABLES
HOW OFTEN DO YOU HAVE A DRINK CONTAINING ALCOHOL: 4 OR MORE TIMES A WEEK
AUDIT-C TOTAL SCORE: 4
HOW OFTEN DO YOU HAVE SIX OR MORE DRINKS ON ONE OCCASION: NEVER
HOW MANY STANDARD DRINKS CONTAINING ALCOHOL DO YOU HAVE ON A TYPICAL DAY: 1 OR 2
SKIP TO QUESTIONS 9-10: 1

## 2025-06-29 ASSESSMENT — SOCIAL DETERMINANTS OF HEALTH (SDOH)
IN A TYPICAL WEEK, HOW MANY TIMES DO YOU TALK ON THE PHONE WITH FAMILY, FRIENDS, OR NEIGHBORS?: MORE THAN THREE TIMES A WEEK
DO YOU BELONG TO ANY CLUBS OR ORGANIZATIONS SUCH AS CHURCH GROUPS UNIONS, FRATERNAL OR ATHLETIC GROUPS, OR SCHOOL GROUPS?: NO
IN THE PAST 12 MONTHS, HAS THE ELECTRIC, GAS, OIL, OR WATER COMPANY THREATENED TO SHUT OFF SERVICE IN YOUR HOME?: NO
HOW OFTEN DO YOU HAVE SIX OR MORE DRINKS ON ONE OCCASION: NEVER
HOW OFTEN DO YOU ATTEND CHURCH OR RELIGIOUS SERVICES?: MORE THAN 4 TIMES PER YEAR
HOW OFTEN DO YOU GET TOGETHER WITH FRIENDS OR RELATIVES?: ONCE A WEEK
HOW OFTEN DO YOU ATTENT MEETINGS OF THE CLUB OR ORGANIZATION YOU BELONG TO?: PATIENT DECLINED
HOW OFTEN DO YOU ATTEND CHURCH OR RELIGIOUS SERVICES?: MORE THAN 4 TIMES PER YEAR
HOW MANY DRINKS CONTAINING ALCOHOL DO YOU HAVE ON A TYPICAL DAY WHEN YOU ARE DRINKING: 1 OR 2
HOW OFTEN DO YOU ATTENT MEETINGS OF THE CLUB OR ORGANIZATION YOU BELONG TO?: PATIENT DECLINED
HOW OFTEN DO YOU HAVE A DRINK CONTAINING ALCOHOL: 4 OR MORE TIMES A WEEK
IN A TYPICAL WEEK, HOW MANY TIMES DO YOU TALK ON THE PHONE WITH FAMILY, FRIENDS, OR NEIGHBORS?: MORE THAN THREE TIMES A WEEK
HOW OFTEN DO YOU GET TOGETHER WITH FRIENDS OR RELATIVES?: ONCE A WEEK
DO YOU BELONG TO ANY CLUBS OR ORGANIZATIONS SUCH AS CHURCH GROUPS UNIONS, FRATERNAL OR ATHLETIC GROUPS, OR SCHOOL GROUPS?: NO

## 2025-06-30 ENCOUNTER — OFFICE VISIT (OUTPATIENT)
Dept: MEDICAL GROUP | Facility: MEDICAL CENTER | Age: 70
End: 2025-06-30
Payer: MEDICARE

## 2025-06-30 VITALS
WEIGHT: 138 LBS | DIASTOLIC BLOOD PRESSURE: 72 MMHG | BODY MASS INDEX: 22.99 KG/M2 | OXYGEN SATURATION: 100 % | HEIGHT: 65 IN | SYSTOLIC BLOOD PRESSURE: 124 MMHG | HEART RATE: 77 BPM | TEMPERATURE: 97.3 F

## 2025-06-30 DIAGNOSIS — E11.69 HYPERLIPIDEMIA DUE TO TYPE 2 DIABETES MELLITUS (HCC): ICD-10-CM

## 2025-06-30 DIAGNOSIS — E03.9 ACQUIRED HYPOTHYROIDISM: ICD-10-CM

## 2025-06-30 DIAGNOSIS — E11.9 TYPE 2 DIABETES MELLITUS WITHOUT RETINOPATHY (HCC): ICD-10-CM

## 2025-06-30 DIAGNOSIS — F33.1 MODERATE EPISODE OF RECURRENT MAJOR DEPRESSIVE DISORDER (HCC): ICD-10-CM

## 2025-06-30 DIAGNOSIS — J34.89 SINUS PRESSURE: ICD-10-CM

## 2025-06-30 DIAGNOSIS — M47.816 LUMBAR SPONDYLOSIS: ICD-10-CM

## 2025-06-30 DIAGNOSIS — E78.5 HYPERLIPIDEMIA DUE TO TYPE 2 DIABETES MELLITUS (HCC): ICD-10-CM

## 2025-06-30 DIAGNOSIS — Z00.00 ANNUAL WELLNESS VISIT: Primary | ICD-10-CM

## 2025-06-30 PROBLEM — M53.3 SACROILIAC JOINT PAIN: Status: RESOLVED | Noted: 2025-04-13 | Resolved: 2025-06-30

## 2025-06-30 LAB
HBA1C MFR BLD: 6.2 % (ref ?–5.8)
POCT INT CON NEG: NEGATIVE
POCT INT CON POS: POSITIVE

## 2025-06-30 RX ORDER — FLUTICASONE PROPIONATE 50 MCG
1 SPRAY, SUSPENSION (ML) NASAL DAILY
Qty: 16 G | Refills: 0 | Status: SHIPPED | OUTPATIENT
Start: 2025-06-30

## 2025-06-30 RX ORDER — TIRZEPATIDE 2.5 MG/.5ML
2.5 INJECTION, SOLUTION SUBCUTANEOUS
Qty: 2 ML | Refills: 0 | Status: SHIPPED | OUTPATIENT
Start: 2025-06-30

## 2025-06-30 RX ORDER — TIZANIDINE 2 MG/1
2 TABLET ORAL 3 TIMES DAILY PRN
Qty: 60 TABLET | Refills: 1 | Status: SHIPPED | OUTPATIENT
Start: 2025-06-30

## 2025-06-30 RX ORDER — AZELASTINE HYDROCHLORIDE 137 UG/1
SPRAY, METERED NASAL
COMMUNITY

## 2025-06-30 ASSESSMENT — ENCOUNTER SYMPTOMS: GENERAL WELL-BEING: FAIR

## 2025-06-30 ASSESSMENT — PATIENT HEALTH QUESTIONNAIRE - PHQ9
SUM OF ALL RESPONSES TO PHQ QUESTIONS 1-9: 13
5. POOR APPETITE OR OVEREATING: 0 - NOT AT ALL
CLINICAL INTERPRETATION OF PHQ2 SCORE: 5

## 2025-06-30 ASSESSMENT — ACTIVITIES OF DAILY LIVING (ADL): BATHING_REQUIRES_ASSISTANCE: 0

## 2025-06-30 ASSESSMENT — FIBROSIS 4 INDEX: FIB4 SCORE: 1.49

## 2025-06-30 NOTE — PROGRESS NOTES
Verbal consent was acquired by the patient to use [employer] ambient listening note generation during this visit Yes     Chief Complaint   Patient presents with   • Lab Results   • Annual Exam   • Depression       HPI:  Che Tovar is a 70 y.o. here for Medicare Annual Wellness Visit     History of Present Illness  The patient is a 70-year-old individual who presents for their annual wellness exam and has concerns about depression.    Depression  - Reports lack of interest in social activities, preferring to stay home for the past year.  - Strained relationship with one child for two years.  - Open to psychological counseling.  - Reports excessive sleepiness and inconsistent sleep quality despite trazodone.  - Struggles to quiet their mind, disrupting sleep.    Elevated Blood Sugar Levels  - Believes elevated blood sugar levels are due to inactivity.  - Eating habits vary, with occasional overeating.  - On Trulicity but finds it ineffective.  - Recently refilled Trulicity prescription and plans to complete the course.  - Acknowledges need for weight loss but feels generally healthy.    Back Condition  - Frustrated with back condition, no improvement.  - Second opinion suggested further surgery, which they are reluctant to undergo.  - Experiences leg weakness and fear of falling.  - Pain management includes Celebrex and Tylenol.  - Third physician recommended medication management and potential ablation.  - Scheduled for another steroid injection, which provides temporary relief.    Supplemental information: Currently taking trazodone, tizanidine, thiamine, rosuvastatin, prenatal vitamins, metformin, levothyroxine 75 mcg, gabapentin 300 mg, Flonase, iron supplements, Trulicity, topical diclofenac gel (occasionally), extra B12, vitamin D3, Celebrex, calcium, vitamin C, and azelastine nasal spray. Not taking CoQ10, methocarbamol, Flexeril, or Valium. Took Valium as needed for travel or MRI relaxation. No  longer on doxycycline.    Patient Active Problem List    Diagnosis Date Noted   • Chronic sinus complaints 12/30/2024   • Lumbar spondylosis 06/17/2024   • Encounter for annual wellness exam in Medicare patient 03/07/2023   • Shoulder pain, right 11/01/2022   • Skin lesion 07/26/2022   • Left elbow pain 07/26/2022   • Leukopenia 07/26/2022   • Vaginal lesion 08/11/2021   • S/P lumbar spinal fusion 08/11/2021   • ELIZABETH (generalized anxiety disorder) 07/16/2019   • Chronic rhinitis 01/09/2018   • Family history of breast cancer 08/17/2017   • Primary insomnia 08/17/2017   • Type 2 diabetes mellitus without retinopathy (HCC) 06/10/2015   • Hyperlipidemia due to type 2 diabetes mellitus (HCC) 10/17/2014   • Hypothyroidism 02/09/2007       Current Medications[1]       Current supplements as per medication list.     Allergies: Nsaids, Codeine, Hydrocodone-acetaminophen, Lovastatin, Polytrim [polymyxin b-trimethoprim], Lisinopril, and Penicillins    Current social contact/activities: Traveling    She  reports that she has quit smoking. Her smoking use included cigarettes. She has never used smokeless tobacco. She reports current alcohol use. She reports that she does not use drugs.  Counseling given: Not Answered  Tobacco comments: light smoker, quit 40 years ago      ROS:    Gait: Uses no assistive device  Ostomy: No  Other tubes: No  Amputations: No  Chronic oxygen use: No  Last eye exam: 6 mo ago  Wears hearing aids: No   : Denies any urinary leakage during the last 6 months    Screening:    Depression Screening  Little interest or pleasure in doing things?  3 - nearly every day  Feeling down, depressed , or hopeless? 2 - more than half the days  Trouble falling or staying asleep, or sleeping too much?  3 - nearly every day  Feeling tired or having little energy?  3 - nearly every day  Poor appetite or overeating?  0 - not at all  Feeling bad about yourself - or that you are a failure or have let yourself or your family  down? 1 - several days  Trouble concentrating on things, such as reading the newspaper or watching television? 1 - several days  Moving or speaking so slowly that other people could have noticed.  Or the opposite - being so fidgety or restless that you have been moving around a lot more than usual?  0 - not at all  Thoughts that you would be better off dead, or of hurting yourself?  0 - not at all  Patient Health Questionnaire Score: 13    If depressive symptoms identified deferred to follow up visit unless specifically addressed in assessment and plan.    Interpretation of PHQ-9 Total Score   Score Severity   1-4 No Depression   5-9 Mild Depression   10-14 Moderate Depression   15-19 Moderately Severe Depression   20-27 Severe Depression    Screening for Cognitive Impairment  Do you or any of your friends or family members have any concern about your memory? Yes  Three Minute Recall (Village, Kitchen, Baby) 3/3    Bhupinder clock face with all 12 numbers and set the hands to show 10 minutes past 11.  Yes    Cognitive concerns identified deferred for follow up unless specifically addressed in assessment and plan.    Fall Risk Assessment  Has the patient had two or more falls in the last year or any fall with injury in the last year?  No    Safety Assessment  Do you always wear your seatbelt?  Yes  Any changes to home needed to function safely? No  Difficulty hearing.  Yes  Patient counseled about all safety risks that were identified.    Functional Assessment ADLs  Are there any barriers preventing you from cooking for yourself or meeting nutritional needs?  No.    Are there any barriers preventing you from driving safely or obtaining transportation?  No.    Are there any barriers preventing you from using a telephone or calling for help?  No    Are there any barriers preventing you from shopping?  No.    Are there any barriers preventing you from taking care of your own finances?  No    Are there any barriers preventing  you from managing your medications?  No    Are there any barriers preventing you from showering, bathing or dressing yourself? No    Are there any barriers preventing you from doing housework or laundry? No    Are there any barriers preventing you from using the toilet?No    Are you currently engaging in any exercise or physical activity?  No.      Self-Assessment of Health  What is your perception of your health? Fair    Do you sleep more than six hours a night? No    In the past 7 days, how much did pain keep you from doing your normal work? None    Do you spend quality time with family or friends (virtually or in person)? Yes    Do you usually eat a heart healthy diet that constists of a variety of fruits, vegetables, whole grains and fiber? Yes    Do you eat foods high in fat and/or Fast Food more than three times per week? Yes    How concerned are you that your medical conditions are not being well managed? Not at all    Are you worried that in the next 2 months, you may not have stable housing that you own, rent, or stay in as part of a household? No        Advance Care Planning  Do you have an Advance Directive, Living Will, Durable Power of , or POLST? Yes          is on file      Health Maintenance Summary            Current Care Gaps       Annual Wellness Visit (Yearly) Overdue since 3/7/2024      03/07/2023  Level of Service: DC ANNUAL WELLNESS VISIT-INCLUDES PPPS SUBSEQUE*    03/07/2023  Subsequent Annual Wellness Visit - Includes PPPS ()    04/17/2019  Visit Dx: Medicare annual wellness visit, initial              Diabetes: Retinopathy Screening (Yearly) Overdue since 3/7/2025      03/07/2024  AMB EXTERNAL RETINAL SCREENING RESULTS    10/06/2021  Done    09/28/2017  REFERRAL FOR RETINAL SCREENING EXAM    06/27/2016 06/09/2015       Only the first 5 history entries have been loaded, but more history exists.            COVID-19 Vaccine (7 - 2024-25 season) Overdue since 6/4/2025       12/04/2024  Imm Admin: COVID-19, mRNA, LNP-S, PF, barron-sucrose, 30 mcg/0.3 mL    09/26/2023  Imm Admin: COVID-19, mRNA, LNP-S, PF, barron-sucrose, 30 mcg/0.3 mL    07/14/2022  Imm Admin: PFIZER DOTSON CAP SARS-COV-2 VACCINATION (12+)    10/23/2021  Imm Admin: PFIZER PURPLE CAP SARS-COV-2 VACCINATION (12+)    04/21/2021  Imm Admin: PFIZER PURPLE CAP SARS-COV-2 VACCINATION (12+)      Only the first 5 history entries have been loaded, but more history exists.                      Upcoming       A1c Screening (Every 6 Months) Next due on 8/27/2025 02/27/2025  HEMOGLOBIN A1C    12/30/2024  POCT Hemoglobin A1C    08/22/2023  HEMOGLOBIN A1C    03/08/2023  HEMOGLOBIN A1C    10/31/2022  HEMOGLOBIN A1C      Only the first 5 history entries have been loaded, but more history exists.              Diabetes: Urine Protein Screening (Yearly) Next due on 12/30/2025 12/30/2024  POCT Microalbumin Creat Ratio Urine    07/21/2022  Microalbumin Creat Ratio Urine (Lab Collect)    03/25/2019  MICROALBUMIN CREAT RATIO URINE    08/23/2017  MICROALBUMIN CREAT RATIO URINE              Diabetes: Monofilament / LE Exam (Yearly) Next due on 12/30/2025 12/30/2024  SmartData: WORKFLOW - DIABETES - DIABETIC FOOT EXAM PERFORMED    12/30/2024  Diabetic Monofilament LE Exam    07/26/2022  SmartData: WORKFLOW - DIABETES - DIABETIC FOOT EXAM PERFORMED    07/26/2022  Diabetic Monofilament LE Exam              Fasting Lipid Profile (Yearly) Next due on 2/27/2026 02/27/2025  Lipid Profile    03/08/2023  Lipid Profile    10/31/2022  Lipid Profile    07/21/2022  Lipid Profile    08/13/2021  Lipid Profile      Only the first 5 history entries have been loaded, but more history exists.              SERUM CREATININE (Yearly) Next due on 2/27/2026 02/27/2025  Comp Metabolic Panel    04/23/2024  Comp Metabolic Panel    08/22/2023  Comp Metabolic Panel    03/08/2023  Comp Metabolic Panel    07/21/2022  Comp Metabolic Panel      Only the first  5 history entries have been loaded, but more history exists.              Mammogram (Yearly) Next due on 4/25/2026 04/25/2025  MA-DIAGNOSTIC MAMMO LEFT W/TOMOSYNTHESIS W/O CAD    04/16/2025  MA-SCREENING MAMMO BILAT W/TOMOSYNTHESIS W/CAD    05/22/2023  MA-SCREENING MAMMO BILAT W/TOMOSYNTHESIS W/CAD    10/12/2021  MA-SCREENING MAMMO BILAT W/TOMOSYNTHESIS W/CAD    10/07/2020  MA-SCREENING MAMMO BILAT W/TOMOSYNTHESIS W/CAD      Only the first 5 history entries have been loaded, but more history exists.              Bone Density Scan (Every 5 Years) Next due on 5/22/2028 05/22/2023  DS-BONE DENSITY STUDY (DEXA)    09/24/2018  DS-BONE DENSITY STUDY (DEXA)              IMM DTaP/Tdap/Td Vaccine (3 - Td or Tdap) Next due on 3/3/2031      03/03/2021  Imm Admin: Tdap Vaccine    07/09/2010  Imm Admin: Tdap Vaccine              Colorectal Cancer Screening (Colonoscopy - Preferred) Next due on 9/9/2032 09/09/2022  REFERRAL TO GI FOR COLONOSCOPY    03/24/2022  Occult Blood Feces component of OCCULT BLOOD STOOL    01/07/2022  COLOGUARD RESULT component of LAB COLOGUARD® COLON CANCER SCREEN    01/07/2022  COLOGUARD COLON CANCER SCREENING    10/10/2018  COLOGUARD (FIT DNA)      Only the first 5 history entries have been loaded, but more history exists.                      Completed or No Longer Recommended       Hepatitis B Vaccine (Hep B) (Series Information) Completed      03/03/2021  Imm Admin: Hepatitis B Vaccine (Adol/Adult)    08/22/2019  Imm Admin: Hepatitis B Vaccine (Adol/Adult)    09/14/2018  Imm Admin: Hepatitis B Vaccine (Adol/Adult)              Influenza Vaccine (Series Information) Completed      12/04/2024  Imm Admin: Influenza high-dose trivalent (PF)    09/26/2023  Imm Admin: Influenza Vaccine Adult HD    11/01/2022  Imm Admin: Influenza Vaccine Adult HD    10/07/2021  Imm Admin: Influenza Vaccine Adult HD    10/01/2020  Imm Admin: Influenza Vaccine Adult HD      Only the first 5 history entries  have been loaded, but more history exists.              Zoster (Shingles) Vaccines (Series Information) Completed      01/28/2022  Imm Admin: Zoster Vaccine Recombinant (RZV) (SHINGRIX)    03/03/2021  Imm Admin: Zoster Vaccine Recombinant (RZV) (SHINGRIX)    11/11/2014  Imm Admin: Zoster Vaccine Live (ZVL) (Zostavax) - HISTORICAL DATA              Hepatitis C Screening  Completed      02/22/2020  Hepatitis C Antibody component of Hep C Virus Antibody              Pneumococcal Vaccine: 50+ Years (Series Information) Completed      09/02/2022  Imm Admin: Pneumococcal Conjugate Vaccine (PCV20)    08/22/2019  Imm Admin: Pneumococcal Conjugate Vaccine (Prevnar/PCV-13)    11/15/2006  Imm Admin: Pneumococcal polysaccharide vaccine (PPSV-23)              Hepatitis A Vaccine (Hep A) (Series Information) Aged Out      No completion history exists for this topic.              HPV Vaccines (Series Information) Aged Out     No completion history exists for this topic.              Polio Vaccine (Inactivated Polio) (Series Information) Aged Out     No completion history exists for this topic.              Meningococcal Immunization (Series Information) Aged Out     No completion history exists for this topic.              Meningococcal B Vaccine (Series Information) Aged Out     No completion history exists for this topic.                            Patient Care Team:  ONELIA Patel as PCP - General (Family Medicine)  Mc Zarate, PT, DPT as Physical Therapist (Physical Therapy)  Mc Zarate, PT, DPT as Physical Therapist (Physical Therapy)      Social History[2]  Family History   Problem Relation Age of Onset   • Arthritis Mother    • Diabetes Mother    • Hypertension Mother    • Hyperlipidemia Mother    • Stroke Mother    • Arthritis Father    • Hyperlipidemia Father    • Hypertension Father    • Diabetes Father    • Arthritis Sister    • Cancer Sister    • Hypertension Sister    • Hyperlipidemia Sister    •  "Alcohol/Drug Sister    • Breast Cancer Sister    • Diabetes Sister    • Hyperlipidemia Brother    • Arthritis Brother    • Hypertension Brother    • Alcohol/Drug Brother    • Diabetes Brother    • Arthritis Sister    • Cancer Sister    • Hypertension Sister    • Hyperlipidemia Sister    • Breast Cancer Sister    • Diabetes Sister    • Arthritis Sister    • Cancer Sister    • Hyperlipidemia Sister    • Breast Cancer Sister    • Diabetes Sister    • Arthritis Sister    • Cancer Sister    • Hyperlipidemia Sister    • Breast Cancer Sister    • Diabetes Sister    • Arthritis Sister    • Hyperlipidemia Sister    • Diabetes Sister    • Hyperlipidemia Sister    • Diabetes Sister    • Hyperlipidemia Sister    • Diabetes Sister    • Hyperlipidemia Brother    • Arthritis Brother    • Diabetes Brother    • Hyperlipidemia Brother    • Diabetes Brother    • Hyperlipidemia Sister    • Diabetes Sister      She  has a past medical history of Allergy, Anesthesia, Anxiety, Diabetes (HCC), High cholesterol, History of gastric bypass (05/07/2012), History of gout (08/17/2017), total hysterectomy (01/15/2015), total hysterectomy (01/15/2015), Pain, PONV (postoperative nausea and vomiting), Psychiatric problem, Sacroiliac joint pain (04/13/2025), and Thyroid disease.   Past Surgical History[3]    Exam:   /72   Pulse 77   Temp 36.3 °C (97.3 °F)   Ht 1.651 m (5' 5\")   Wt 62.6 kg (138 lb)   SpO2 100%  Body mass index is 22.96 kg/m².    Hearing excellent.    Dentition good  Alert, oriented in no acute distress.  Eye contact is good, speech goal directed, affect calm    Physical Exam  Constitutional:       General: She is not in acute distress.  HENT:      Head: Normocephalic and atraumatic.      Right Ear: Tympanic membrane and external ear normal.      Left Ear: Tympanic membrane and external ear normal.      Nose: No nasal deformity.      Mouth/Throat:      Lips: Pink.      Mouth: Mucous membranes are moist.      Pharynx: " Oropharynx is clear. Uvula midline. No posterior oropharyngeal erythema.   Eyes:      General: Lids are normal.      Extraocular Movements: Extraocular movements intact.      Conjunctiva/sclera: Conjunctivae normal.      Pupils: Pupils are equal, round, and reactive to light.   Neck:      Trachea: Trachea normal.   Cardiovascular:      Rate and Rhythm: Normal rate and regular rhythm.      Heart sounds: Normal heart sounds. No murmur heard.     No friction rub. No gallop.   Pulmonary:      Effort: Pulmonary effort is normal. No accessory muscle usage.      Breath sounds: Normal breath sounds. No wheezing or rales.   Abdominal:      General: Bowel sounds are normal.      Palpations: Abdomen is soft.      Tenderness: There is no abdominal tenderness.   Musculoskeletal:      Cervical back: Normal range of motion and neck supple.      Right lower leg: No edema.      Left lower leg: No edema.   Lymphadenopathy:      Cervical: No cervical adenopathy.   Skin:     General: Skin is warm and dry.      Findings: No rash.   Neurological:      General: No focal deficit present.      Mental Status: She is alert and oriented to person, place, and time. Mental status is at baseline.      GCS: GCS eye subscore is 4. GCS verbal subscore is 5. GCS motor subscore is 6.      Motor: No weakness.      Gait: Gait is intact.   Psychiatric:         Attention and Perception: Attention normal.         Mood and Affect: Mood and affect normal.         Speech: Speech normal.     Results  - Labs:    - A1c: 7 (02/2025)    - LDL cholesterol: Increased slightly (02/2025)    - GFR: Normal (02/2025)    - Basic electrolytes: Normal (02/2025)    - Kidney function: Normal (02/2025)    - Liver function: Normal (02/2025)    - White blood cell count: Stable (02/2025)      Assessment and Plan. The following treatment and monitoring plan is recommended:    Assessment & Plan  1. Annual wellness visit.  Services suggested: No services needed at this time  Health  Care Screening: Age-appropriate preventive services recommended by USPTF and ACIP covered by Medicare were discussed today. Services ordered if indicated and agreed upon by the patient.  Referrals offered: Community-based lifestyle interventions to reduce health risks and promote self-management and wellness, fall prevention, nutrition, physical activity, tobacco-use cessation, weight loss, and mental health services as per orders if indicated.    Discussion today about general wellness and lifestyle habits:    Prevent falls and reduce trip hazards; Cautioned about securing or removing rugs.  Have a working fire alarm and carbon monoxide detector;   Engage in regular physical activity and social activities     2. Depression: Chronic.  Unstable.  - Referral to psychology services placed.  - Believed to benefit from psychology and talk therapy services.    3. Type 2 diabetes mellitus: Chronic.  Unstable.  - A1c 7.0 in 02/2025.  - Will be switched from Trulicity to Mounjaro 2.5 mg, sent to preferred pharmacy.  - A1c will be checked in clinic today.  - Advised to continue dietary modifications.    4. Back pain: Chronic.  Unstable.  - Scheduled for another steroid injection.  - Continues Celebrex and Tylenol for pain management.  - Discussed potential benefit of ablation procedure.    Follow-up in 6 weeks.    Concetta was seen today for lab results, annual exam and depression.    Diagnoses and all orders for this visit:    Annual wellness visit    Moderate episode of recurrent major depressive disorder (HCC)  -     Referral to Behavioral Health    Type 2 diabetes mellitus without retinopathy (HCC)  -     POCT Hemoglobin A1C  -     CBC WITH DIFFERENTIAL; Future  -     Comp Metabolic Panel; Future  -     ESTIMATED GFR; Future  -     Lipid Profile; Future  -     HEMOGLOBIN A1C; Future  -     Tirzepatide (MOUNJARO) 2.5 MG/0.5ML Solution Auto-injector; Inject 2.5 mg under the skin every 7 days.    Sinus pressure  -      fluticasone (FLONASE) 50 MCG/ACT nasal spray; Administer 1 Spray into affected nostril(S) every day.    Lumbar spondylosis  -     tizanidine (ZANAFLEX) 2 MG tablet; Take 1 Tablet by mouth 3 times a day as needed (muscle spasms).    Acquired hypothyroidism  -     CBC WITH DIFFERENTIAL; Future  -     TSH+FREE T4    Hyperlipidemia due to type 2 diabetes mellitus (HCC)  -     CBC WITH DIFFERENTIAL; Future  -     Comp Metabolic Panel; Future  -     ESTIMATED GFR; Future  -     Lipid Profile; Future      Follow-up: No follow-ups on file.     Please note that this dictation was created using voice recognition software. I have made every reasonable attempt to correct obvious errors, but I expect that there are errors of grammar and possibly content that I did not discover before finalizing the note.           [1]  Current Outpatient Medications   Medication Sig Dispense Refill   • tizanidine (ZANAFLEX) 2 MG tablet Take 1 Tablet by mouth 3 times a day as needed (muscle spasms). 60 Tablet 1   • fluticasone (FLONASE) 50 MCG/ACT nasal spray Administer 1 Spray into affected nostril(S) every day. 16 g 0   • Azelastine (ASTELIN) 137 MCG/SPRAY Solution U 1 SPR IEN BID     • traZODone (DESYREL) 100 MG Tab TAKE 1 TABLET BY MOUTH EVERY NIGHT AT BEDTIME AS NEEDED FOR SLEEP 90 Tablet 3   • Blood Glucose Meter Kit Test blood sugar as recommended by provider.  Insurance approved blood glucose monitoring kit. 1 Kit 0   • Blood Glucose Test Strips Use one insurance approved strip to test blood sugar three times daily before meals. 100 Strip 11   • Lancets Use one insurance approved lancet to test blood sugar three times daily before meals. 100 Each 11   • Alcohol Swabs Wipe site with prep pad prior to injection. 100 Each 11   • celecoxib (CELEBREX) 200 MG Cap TAKE 1 CAPSULE BY MOUTH TWICE A  Capsule 3   • levothyroxine (SYNTHROID) 75 MCG Tab TAKE ONE TABLET BY MOUTH EVERY MORNING ON AN EMPTY STOMACH 90 Tablet 3   • Dulaglutide  (TRULICITY) 3 MG/0.5ML Solution Auto-injector Inject 3 mg under the skin every 7 days. 6 mL 3   • metFORMIN ER (GLUCOPHAGE XR) 500 MG TABLET SR 24 HR TAKE ONE TABLET BY MOUTH THREE TIMES A DAY WITH MEALS 270 Tablet 3   • rosuvastatin (CRESTOR) 5 MG Tab TAKE ONE TABLET BY MOUTH EVERY EVENING 90 Tablet 3   • gabapentin (NEURONTIN) 300 MG Cap TAKE ONE CAPSULE BY MOUTH THREE TIMES A DAY AS NEEDED FOR PAIN 90 Capsule 5   • diclofenac sodium (VOLTAREN) 1 % Gel Apply 2 g topically 4 times a day as needed (apply to affected area as needed). 150 g 4   • CALCIUM PO Take 2 Tablets by mouth in the morning, at noon, and at bedtime.     • Prenatal Vit-Fe Fumarate-FA (PRENATAL PO) Take 1 tablet by mouth in the morning, at noon, and at bedtime.     • Cholecalciferol (D3 PO) Take 2 Capsules by mouth every day.     • Thiamine HCl (B-1 PO) Take 1 tablet by mouth every day.     • Ascorbic Acid (VITAMIN C) 1000 MG Tab Take 1,000 mg by mouth every day.     • Cyanocobalamin (VITAMIN B-12 PO) Take 1 tablet by mouth every day.     • ferrous sulfate 325 (65 Fe) MG tablet Take 325 mg by mouth every 7 days. On Friday     • glucose blood (ONE TOUCH ULTRA TEST) strip 1 Strip by Other route 3 times a day as needed. 100 Strip 2     No current facility-administered medications for this visit.   [2]  Social History  Tobacco Use   • Smoking status: Former     Types: Cigarettes   • Smokeless tobacco: Never   • Tobacco comments:     light smoker, quit 40 years ago   Vaping Use   • Vaping status: Never Used   Substance Use Topics   • Alcohol use: Yes     Comment: rare   • Drug use: No   [3]  Past Surgical History:  Procedure Laterality Date   • LUMBAR FUSION O-ARM  6/21/2021    Procedure: FUSION, SPINE, LUMBAR, WITH O-ARM IMAGING GUIDANCE - L5-S1.;  Surgeon: Germaine Bullard M.D.;  Location: SURGERY [facility];  Service: Neurosurgery   • ABDOMINAL EXPLORATION     • EYE SURGERY     • GASTRIC BYPASS LAPAROSCOPIC     • HERNIA REPAIR

## 2025-07-07 NOTE — Clinical Note
REFERRAL APPROVAL NOTICE         Sent on July 7, 2025                   Concetta Tovar  [address]  [place] [address]                   Dear Ms. Tovar,    After a careful review of the medical information and benefit coverage, [facility] has processed your referral. See below for additional details.    If applicable, you must be actively enrolled with your insurance for coverage of the authorized service. If you have any questions regarding your coverage, please contact your insurance directly.    REFERRAL INFORMATION   Referral #:  [id number]  Referred-To Provider    Referred-By Provider:  Behavioral [facility]    ONELIA Patel   [facility]      [address]  [address]  [place] [address]  [phone] [address]  [place] [address]  [phone]    Referral Start Date:  06/30/2025  Referral End Date:   06/30/2026             SCHEDULING  If you do not already have an appointment, please call [phone] to make an appointment.     MORE INFORMATION  If you do not already have a [facility] account, sign up at: [email or web address]  You can access your medical information, make appointments, see lab results, billing information, and more.  If you have questions regarding this referral, please contact  the [facility] Referrals department at:             [phone]. Monday - Friday 8:00AM - 5:00PM.     Sincerely,    [facility]

## 2025-07-20 ENCOUNTER — PATIENT MESSAGE (OUTPATIENT)
Dept: MEDICAL GROUP | Facility: MEDICAL CENTER | Age: 70
End: 2025-07-20
Payer: MEDICARE

## 2025-07-20 DIAGNOSIS — E11.69 HYPERLIPIDEMIA DUE TO TYPE 2 DIABETES MELLITUS (HCC): ICD-10-CM

## 2025-07-20 DIAGNOSIS — E78.5 HYPERLIPIDEMIA DUE TO TYPE 2 DIABETES MELLITUS (HCC): ICD-10-CM

## 2025-07-20 DIAGNOSIS — E11.9 TYPE 2 DIABETES MELLITUS WITHOUT RETINOPATHY (HCC): Primary | ICD-10-CM

## 2025-07-21 RX ORDER — TIRZEPATIDE 5 MG/.5ML
5 INJECTION, SOLUTION SUBCUTANEOUS
Qty: 2 ML | Refills: 0 | Status: SHIPPED | OUTPATIENT
Start: 2025-07-21

## 2025-08-17 DIAGNOSIS — E11.69 HYPERLIPIDEMIA DUE TO TYPE 2 DIABETES MELLITUS (HCC): ICD-10-CM

## 2025-08-17 DIAGNOSIS — E11.9 TYPE 2 DIABETES MELLITUS WITHOUT RETINOPATHY (HCC): ICD-10-CM

## 2025-08-17 DIAGNOSIS — E78.5 HYPERLIPIDEMIA DUE TO TYPE 2 DIABETES MELLITUS (HCC): ICD-10-CM

## 2025-08-18 RX ORDER — TIRZEPATIDE 5 MG/.5ML
INJECTION, SOLUTION SUBCUTANEOUS
Qty: 2 ML | Refills: 0 | Status: SHIPPED | OUTPATIENT
Start: 2025-08-18

## 2025-09-09 ENCOUNTER — APPOINTMENT (OUTPATIENT)
Dept: MEDICAL GROUP | Facility: MEDICAL CENTER | Age: 70
End: 2025-09-09
Payer: MEDICARE

## (undated) DEVICE — SET EXTENSION WITH 2 PORTS (48EA/CA) ***PART #2C8610 IS A SUBSTITUTE*****

## (undated) DEVICE — GLOVE BIOGEL SZ 8 SURGICAL PF LTX - (50PR/BX 4BX/CA)

## (undated) DEVICE — MASK ANESTHESIA ADULT  - (100/CA)

## (undated) DEVICE — SENSOR SPO2 NEO LNCS ADHESIVE (20/BX) SEE USER NOTES

## (undated) DEVICE — SUTURE 3-0 VICRYL PLUS SH - 27 INCH (36/BX)

## (undated) DEVICE — SPHERE NAVIGATION STEALTH (5EA/TY 12TY/PK)

## (undated) DEVICE — DRAPE LAPAROTOMY T SHEET - (12EA/CA)

## (undated) DEVICE — DRAPE SURG STERI-DRAPE 7X11OD - (40EA/CA)

## (undated) DEVICE — SUTURE 2-0 VICRYL PLUS CT-1 - 8 X 18 INCH(12/BX)

## (undated) DEVICE — NEPTUNE 4 PORT MANIFOLD - (20/PK)

## (undated) DEVICE — LACTATED RINGERS INJ 1000 ML - (14EA/CA 60CA/PF)

## (undated) DEVICE — DRESSING XEROFORM 1X8 - (50/BX 4BX/CA)

## (undated) DEVICE — GLOVE BIOGEL PI INDICATOR SZ 6.5 SURGICAL PF LF - (50/BX 4BX/CA)

## (undated) DEVICE — SUTURE 1 VICRYL PLUS CTX - 8 X 18 INCH (12/BX)

## (undated) DEVICE — INTRAOP NEURO IN OR 1:1 PER 15 MIN

## (undated) DEVICE — SPONGE GAUZESTER 4 X 4 4PLY - (128PK/CA)

## (undated) DEVICE — PROTECTOR ULNA NERVE - (36PR/CA)

## (undated) DEVICE — TOOL DISSECT MATCH HEAD

## (undated) DEVICE — PUMP ON-Q 270 DUAL 2ML FIXED RATE (5EA/CA)

## (undated) DEVICE — GLOVE BIOGEL INDICATOR SZ 8 SURGICAL PF LTX - (50/BX 4BX/CA)

## (undated) DEVICE — CELLSAVER STAT

## (undated) DEVICE — BONE MILL BM210

## (undated) DEVICE — SODIUM CHL IRRIGATION 0.9% 1000ML (12EA/CA)

## (undated) DEVICE — TUBING C&T SET FLYING LEADS DRAIN TUBING (10EA/BX)

## (undated) DEVICE — SEALER BIPOLAR 2.3 AQUAMANTYS

## (undated) DEVICE — SOD. CHL. INJ. 0.9% 1000 ML - (14EA/CA 60CA/PF)

## (undated) DEVICE — ELECTRODE 850 FOAM ADHESIVE - HYDROGEL RADIOTRNSPRNT (50/PK)

## (undated) DEVICE — TOWEL STOP TIMEOUT SAFETY FLAG (40EA/CA)

## (undated) DEVICE — KIT EVACUATER 3 SPRING PVC LF 1/8 DRAIN SIZE (10EA/CA)"

## (undated) DEVICE — BONE PRESS SPINAL EDITION HENSLER (10EA/CA)

## (undated) DEVICE — SET LEADWIRE 5 LEAD BEDSIDE DISPOSABLE ECG (1SET OF 5/EA)

## (undated) DEVICE — SUCTION INSTRUMENT YANKAUER BULBOUS TIP W/O VENT (50EA/CA)

## (undated) DEVICE — KIT ANESTHESIA W/CIRCUIT & 3/LT BAG W/FILTER (20EA/CA)

## (undated) DEVICE — CHLORAPREP 26 ML APPLICATOR - ORANGE TINT(25/CA)

## (undated) DEVICE — CELLSAVER PACK

## (undated) DEVICE — ARMREST CRADLE FOAM - (2PR/PK 12PR/CA)

## (undated) DEVICE — GOWN WARMING STANDARD FLEX - (30/CA)

## (undated) DEVICE — TUBING CLEARLINK DUO-VENT - C-FLO (48EA/CA)

## (undated) DEVICE — MIDAS LUBRICATOR DIFFUSER PACK (4EA/CA)

## (undated) DEVICE — KIT SURGIFLO W/OUT THROMBIN - (6EA/CA)

## (undated) DEVICE — ADHESIVE DERMABOND HVD MINI (12EA/BX)

## (undated) DEVICE — PIN PERCUTANEOUS STERILE 150MM

## (undated) DEVICE — ELECTRODE DUAL RETURN W/ CORD - (50/PK)

## (undated) DEVICE — DRAPE MAYO STAND - (30/CA)

## (undated) DEVICE — PIN PERCUTANEOUS STERILE 100MM

## (undated) DEVICE — CANISTER SUCTION 3000ML MECHANICAL FILTER AUTO SHUTOFF MEDI-VAC NONSTERILE LF DISP  (40EA/CA)

## (undated) DEVICE — LACTATED RINGERS INJ. 500 ML - (24EA/CA)

## (undated) DEVICE — CATHETER ON-Q SILVER SOAKER 5IN  (5EA/CA)  - SUB ORDER #4428

## (undated) DEVICE — DEVICE MONOPOLAR RF PEAK PLASMABLADE 3.0S

## (undated) DEVICE — SUTURE GENERAL

## (undated) DEVICE — HEADREST PRONEVIEW LARGE - (10/CA)

## (undated) DEVICE — DRAPE LARGE 3 QUARTER - (20/CA)

## (undated) DEVICE — SLEEVE, VASO, THIGH, MED

## (undated) DEVICE — PACK NEURO - (2EA/CA)

## (undated) DEVICE — GLOVE BIOGEL SZ 6.5 SURGICAL PF LTX (50PR/BX 4BX/CA)

## (undated) DEVICE — GOWN SURGEONS LARGE - (32/CA)